# Patient Record
Sex: FEMALE | Race: WHITE | NOT HISPANIC OR LATINO | Employment: OTHER | URBAN - METROPOLITAN AREA
[De-identification: names, ages, dates, MRNs, and addresses within clinical notes are randomized per-mention and may not be internally consistent; named-entity substitution may affect disease eponyms.]

---

## 2017-01-19 ENCOUNTER — HOSPITAL ENCOUNTER (OUTPATIENT)
Dept: MAMMOGRAPHY | Facility: HOSPITAL | Age: 72
Discharge: HOME | End: 2017-01-19
Attending: FAMILY MEDICINE | Admitting: FAMILY MEDICINE

## 2017-04-27 ENCOUNTER — CONVERSION ENCOUNTER (OUTPATIENT)
Dept: CARDIOLOGY | Facility: CLINIC | Age: 72
End: 2017-04-27

## 2017-10-24 ENCOUNTER — CONVERSION ENCOUNTER (OUTPATIENT)
Dept: CARDIOLOGY | Facility: CLINIC | Age: 72
End: 2017-10-24

## 2018-01-25 ENCOUNTER — HOSPITAL ENCOUNTER (OUTPATIENT)
Dept: MAMMOGRAPHY | Facility: HOSPITAL | Age: 73
Discharge: HOME | End: 2018-01-25
Attending: FAMILY MEDICINE | Admitting: FAMILY MEDICINE

## 2018-10-22 ENCOUNTER — CONVERSION ENCOUNTER (OUTPATIENT)
Dept: CARDIOLOGY | Facility: CLINIC | Age: 73
End: 2018-10-22

## 2018-10-22 ENCOUNTER — HOSPITAL ENCOUNTER (OUTPATIENT)
Dept: LAB | Facility: HOSPITAL | Age: 73
Discharge: HOME | End: 2018-10-22
Attending: INTERNAL MEDICINE | Admitting: INTERNAL MEDICINE

## 2018-10-22 LAB
ALBUMIN SERPL-MCNC: 4 G/DL (ref 3.5–4.8)
ALBUMIN/GLOB SERPL: 1.6 {RATIO} (ref 1–1.7)
ALP SERPL-CCNC: 76 IU/L (ref 32–91)
ALT SERPL-CCNC: 21 IU/L (ref 14–54)
ANION GAP SERPL CALC-SCNC: 12.2 MMOL/L (ref 10–20)
AST SERPL-CCNC: 28 IU/L (ref 15–41)
BASOPHILS # BLD AUTO: 0.1 10*3/UL (ref 0–0.2)
BASOPHILS NFR BLD AUTO: 2 % (ref 0–2)
BILIRUB SERPL-MCNC: 2.2 MG/DL (ref 0.3–1.2)
BUN SERPL-MCNC: 13 MG/DL (ref 8–20)
BUN/CREAT SERPL: 13 (ref 5.4–26.2)
CALCIUM SERPL-MCNC: 9.4 MG/DL (ref 8.9–10.3)
CHLORIDE SERPL-SCNC: 101 MMOL/L (ref 101–111)
CHOLEST SERPL-MCNC: 187 MG/DL
CHOLEST/HDLC SERPL: 2.2 {RATIO}
CONV CO2: 28 MMOL/L (ref 22–32)
CONV LDL CHOLESTEROL DIRECT: 94 MG/DL (ref 0–100)
CONV TOTAL PROTEIN: 6.5 G/DL (ref 6.1–7.9)
CREAT UR-MCNC: 1 MG/DL (ref 0.4–1)
DIFFERENTIAL METHOD BLD: (no result)
EOSINOPHIL # BLD AUTO: 0.2 10*3/UL (ref 0–0.3)
EOSINOPHIL # BLD AUTO: 3 % (ref 0–3)
ERYTHROCYTE [DISTWIDTH] IN BLOOD BY AUTOMATED COUNT: 13 % (ref 11.5–14.5)
GLOBULIN UR ELPH-MCNC: 2.5 G/DL (ref 2.5–3.8)
GLUCOSE SERPL-MCNC: 83 MG/DL (ref 65–99)
HCT VFR BLD AUTO: 39.6 % (ref 35–49)
HDLC SERPL-MCNC: 86 MG/DL
HGB BLD-MCNC: 13.3 G/DL (ref 12–15)
LDLC/HDLC SERPL: 1.1 {RATIO}
LIPID INTERPRETATION: NORMAL
LYMPHOCYTES # BLD AUTO: 1.2 10*3/UL (ref 0.8–4.8)
LYMPHOCYTES NFR BLD AUTO: 23 % (ref 18–42)
MAGNESIUM SERPL-MCNC: 1.8 MG/DL (ref 1.8–2.5)
MCH RBC QN AUTO: 31.9 PG (ref 26–32)
MCHC RBC AUTO-ENTMCNC: 33.7 G/DL (ref 32–36)
MCV RBC AUTO: 94.7 FL (ref 80–94)
MONOCYTES # BLD AUTO: 0.5 10*3/UL (ref 0.1–1.3)
MONOCYTES NFR BLD AUTO: 10 % (ref 2–11)
NEUTROPHILS # BLD AUTO: 3.5 10*3/UL (ref 2.3–8.6)
NEUTROPHILS NFR BLD AUTO: 62 % (ref 50–75)
NRBC BLD AUTO-RTO: 0 /100{WBCS}
NRBC/RBC NFR BLD MANUAL: 0 10*3/UL
PLATELET # BLD AUTO: 210 10*3/UL (ref 150–450)
PMV BLD AUTO: 7.7 FL (ref 7.4–10.4)
POTASSIUM SERPL-SCNC: 4.2 MMOL/L (ref 3.6–5.1)
RBC # BLD AUTO: 4.18 10*6/UL (ref 4–5.4)
SODIUM SERPL-SCNC: 137 MMOL/L (ref 136–144)
TRIGL SERPL-MCNC: 61 MG/DL
TSH SERPL-ACNC: 3.09 UIU/ML (ref 0.34–5.6)
VLDLC SERPL CALC-MCNC: 7.3 MG/DL
WBC # BLD AUTO: 5.5 10*3/UL (ref 4.5–11.5)

## 2019-03-14 ENCOUNTER — HOSPITAL ENCOUNTER (OUTPATIENT)
Dept: MAMMOGRAPHY | Facility: HOSPITAL | Age: 74
Discharge: HOME | End: 2019-03-14
Attending: FAMILY MEDICINE | Admitting: FAMILY MEDICINE

## 2019-06-04 VITALS
WEIGHT: 131.5 LBS | HEART RATE: 48 BPM | RESPIRATION RATE: 16 BRPM | DIASTOLIC BLOOD PRESSURE: 82 MMHG | SYSTOLIC BLOOD PRESSURE: 122 MMHG | OXYGEN SATURATION: 99 % | HEART RATE: 83 BPM | WEIGHT: 125.25 LBS | OXYGEN SATURATION: 95 % | DIASTOLIC BLOOD PRESSURE: 94 MMHG | HEART RATE: 80 BPM | SYSTOLIC BLOOD PRESSURE: 180 MMHG | DIASTOLIC BLOOD PRESSURE: 100 MMHG | WEIGHT: 132.75 LBS | SYSTOLIC BLOOD PRESSURE: 162 MMHG

## 2019-06-24 ENCOUNTER — ANTICOAGULATION VISIT (OUTPATIENT)
Dept: CARDIOLOGY | Facility: CLINIC | Age: 74
End: 2019-06-24

## 2019-06-24 ENCOUNTER — TELEPHONE (OUTPATIENT)
Dept: CARDIOLOGY | Facility: CLINIC | Age: 74
End: 2019-06-24

## 2019-06-24 DIAGNOSIS — Z79.01 LONG TERM (CURRENT) USE OF ANTICOAGULANTS: ICD-10-CM

## 2019-06-24 DIAGNOSIS — Z95.2 HX OF AORTIC VALVE REPLACEMENT, MECHANICAL: ICD-10-CM

## 2019-06-24 LAB
INR PPP: 4 (ref 2.5–3.5)
INR PPP: 4 (ref 2.5–3.5)

## 2019-06-24 NOTE — TELEPHONE ENCOUNTER
[facility] called to report INR results patient called in. Patients INR I 4.0, patient tested it today. Please call patient back with dosage changes.

## 2019-07-15 ENCOUNTER — ANTICOAGULATION VISIT (OUTPATIENT)
Dept: CARDIOLOGY | Facility: CLINIC | Age: 74
End: 2019-07-15

## 2019-07-15 DIAGNOSIS — Z95.2 HX OF AORTIC VALVE REPLACEMENT, MECHANICAL: ICD-10-CM

## 2019-07-15 DIAGNOSIS — Z79.01 LONG TERM (CURRENT) USE OF ANTICOAGULANTS: ICD-10-CM

## 2019-07-15 LAB — INR PPP: 3.2 (ref 2.5–3.5)

## 2019-07-31 ENCOUNTER — ANTICOAGULATION VISIT (OUTPATIENT)
Dept: CARDIOLOGY | Facility: CLINIC | Age: 74
End: 2019-07-31

## 2019-07-31 DIAGNOSIS — Z95.2 HX OF AORTIC VALVE REPLACEMENT, MECHANICAL: ICD-10-CM

## 2019-07-31 DIAGNOSIS — Z79.01 LONG TERM (CURRENT) USE OF ANTICOAGULANTS: ICD-10-CM

## 2019-07-31 LAB — INR PPP: 2.7 (ref 2.5–3.5)

## 2019-08-16 ENCOUNTER — ANTICOAGULATION VISIT (OUTPATIENT)
Dept: CARDIOLOGY | Facility: CLINIC | Age: 74
End: 2019-08-16

## 2019-08-16 DIAGNOSIS — Z79.01 LONG TERM (CURRENT) USE OF ANTICOAGULANTS: ICD-10-CM

## 2019-08-16 DIAGNOSIS — Z95.2 HX OF AORTIC VALVE REPLACEMENT, MECHANICAL: ICD-10-CM

## 2019-08-16 LAB — INR PPP: 3.1 (ref 2.5–3.5)

## 2019-09-03 ENCOUNTER — ANTICOAGULATION VISIT (OUTPATIENT)
Dept: CARDIOLOGY | Facility: CLINIC | Age: 74
End: 2019-09-03

## 2019-09-03 DIAGNOSIS — Z79.01 LONG TERM (CURRENT) USE OF ANTICOAGULANTS: ICD-10-CM

## 2019-09-03 DIAGNOSIS — Z95.2 HX OF AORTIC VALVE REPLACEMENT, MECHANICAL: ICD-10-CM

## 2019-09-03 LAB — INR PPP: 3.2 (ref 2.5–3.5)

## 2019-09-23 LAB — INR PPP: 3

## 2019-09-24 ENCOUNTER — ANTICOAGULATION VISIT (OUTPATIENT)
Dept: CARDIOLOGY | Facility: CLINIC | Age: 74
End: 2019-09-24

## 2019-09-24 DIAGNOSIS — Z95.2 HX OF AORTIC VALVE REPLACEMENT, MECHANICAL: ICD-10-CM

## 2019-09-24 DIAGNOSIS — Z79.01 LONG TERM (CURRENT) USE OF ANTICOAGULANTS: ICD-10-CM

## 2019-10-14 ENCOUNTER — ANTICOAGULATION VISIT (OUTPATIENT)
Dept: CARDIOLOGY | Facility: CLINIC | Age: 74
End: 2019-10-14

## 2019-10-14 DIAGNOSIS — Z95.2 HX OF AORTIC VALVE REPLACEMENT, MECHANICAL: ICD-10-CM

## 2019-10-14 DIAGNOSIS — Z79.01 LONG TERM (CURRENT) USE OF ANTICOAGULANTS: ICD-10-CM

## 2019-10-14 LAB — INR PPP: 2.5

## 2019-11-04 ENCOUNTER — ANTICOAGULATION VISIT (OUTPATIENT)
Dept: CARDIOLOGY | Facility: CLINIC | Age: 74
End: 2019-11-04

## 2019-11-04 DIAGNOSIS — Z79.01 LONG TERM (CURRENT) USE OF ANTICOAGULANTS: ICD-10-CM

## 2019-11-04 DIAGNOSIS — Z95.2 HX OF AORTIC VALVE REPLACEMENT, MECHANICAL: ICD-10-CM

## 2019-11-04 LAB — INR PPP: 2.4

## 2019-11-12 ENCOUNTER — OFFICE VISIT (OUTPATIENT)
Dept: CARDIOLOGY | Facility: CLINIC | Age: 74
End: 2019-11-12

## 2019-11-12 VITALS
SYSTOLIC BLOOD PRESSURE: 164 MMHG | HEART RATE: 86 BPM | HEIGHT: 63 IN | WEIGHT: 127 LBS | OXYGEN SATURATION: 100 % | BODY MASS INDEX: 22.5 KG/M2 | DIASTOLIC BLOOD PRESSURE: 112 MMHG

## 2019-11-12 DIAGNOSIS — I10 ESSENTIAL HYPERTENSION: ICD-10-CM

## 2019-11-12 DIAGNOSIS — Z79.01 LONG TERM (CURRENT) USE OF ANTICOAGULANTS: ICD-10-CM

## 2019-11-12 DIAGNOSIS — I48.20 ATRIAL FIBRILLATION, CHRONIC (HCC): ICD-10-CM

## 2019-11-12 DIAGNOSIS — E78.5 DYSLIPIDEMIA: ICD-10-CM

## 2019-11-12 DIAGNOSIS — Z95.2 HISTORY OF MITRAL VALVE REPLACEMENT: Primary | ICD-10-CM

## 2019-11-12 PROCEDURE — 93000 ELECTROCARDIOGRAM COMPLETE: CPT | Performed by: INTERNAL MEDICINE

## 2019-11-12 PROCEDURE — 99214 OFFICE O/P EST MOD 30 MIN: CPT | Performed by: INTERNAL MEDICINE

## 2019-11-12 RX ORDER — LISINOPRIL 20 MG/1
10 TABLET ORAL 2 TIMES DAILY
COMMUNITY
End: 2020-06-30 | Stop reason: DRUGHIGH

## 2019-11-12 RX ORDER — ATORVASTATIN CALCIUM 10 MG/1
10 TABLET, FILM COATED ORAL DAILY
Refills: 3 | Status: ON HOLD | COMMUNITY
Start: 2019-10-07 | End: 2022-10-15 | Stop reason: SDUPTHER

## 2019-11-12 RX ORDER — WARFARIN SODIUM 5 MG/1
5 TABLET ORAL WEEKLY
COMMUNITY
Start: 2014-08-05 | End: 2022-10-14

## 2019-11-12 RX ORDER — ATENOLOL 100 MG/1
TABLET ORAL
COMMUNITY
Start: 2018-10-21 | End: 2020-01-20

## 2019-11-12 RX ORDER — ASPIRIN 81 MG/1
81 TABLET ORAL DAILY
COMMUNITY
Start: 2014-10-22

## 2019-11-12 NOTE — PROGRESS NOTES
Encounter Date:11/12/2019  Last seen 5/16/2019      Patient ID: Melody Hernandez is a 73 y.o. female.    Chief Complaint:  Follow-up status post mitral valve replacement  Left bundle branch block  Chronic atrial fibrillation  Hypertension  Dyslipidemia  Anticoagulation management    History of Present Illness  Since I have last seen, the patient has been without any chest discomfort , unusual shortness of breath, palpitations, dizziness or syncope.  Denies having any headache ,abdominal pain ,nausea, vomiting , diarrhea constipation, loss of weight or loss of appetite.  Denies having any excessive bruising ,hematuria or blood in the stool.    Review of all systems negative except as indicated    Assessment and Plan       ///////////////////  Impression  ==============   - Status post mitral valve replacement with [employer] mechanical heart valve and pulmonary vein isolation for atrial fibrillation May 2010 (patient had history of significant mitral valve prolapse and mitral regurgitation).     -   Persistent and chronic atrial fibrillation . Status post electrical cardioversion 05/24/2013 and 05/13/2016.   Patient is in atrial fibrillation today.     -moderate tricuspid regurgitation     - left bundle-branch block     -  Exertional shortness of breath and fatigue  -stable and improved     Lexiscan Cardiolite test is negative for myocardial ischemia 10/25/2016.  Echocardiogram 10/25/2016 revealed normally functioning prosthetic mitral valve biatrial enlargement moderate mitral regurgitation and thickened aortic valve.     - Dyslipidemia and hypertension      - Smoker      - Family history of coronary artery disease  .   - allergy to Cipro and codeine     -history of intolerance to amiodarone.  ============  Plan  ================  EKG showed atrial fibrillation with controlled ventricular response.  Anticoagulation status was reviewed.  Home monitoring.  INR is around 2.3    continue atenolol 50 mg twice a day and  lisinopril 10 mg for blood pressure more than 150.  Patient is off Lanoxin.    Continue Coumadin and baby aspirin.  Continue Lipitor   medications were reviewed and updated.   followup in the office in 6 months.  Further plan will depend on patient's progress.  [[[[[[[[[[[[[[[[[[[[[[[[[            Diagnosis Plan   1. History of mitral valve replacement  ECG 12 Lead   2. Dyslipidemia  ECG 12 Lead   3. Long term (current) use of anticoagulants [Z79.01]  ECG 12 Lead   4. Essential hypertension     5. Atrial fibrillation, chronic     LAB RESULTS (LAST 7 DAYS)    CBC        BMP        CMP         BNP        TROPONIN        CoAg        Creatinine Clearance  CrCl cannot be calculated (Patient's most recent lab result is older than the maximum 30 days allowed.).    ABG        Radiology  No radiology results for the last day                The following portions of the patient's history were reviewed and updated as appropriate: allergies, current medications, past family history, past medical history, past social history, past surgical history and problem list.    Review of Systems   Constitution: Negative for malaise/fatigue.   Cardiovascular: Negative for chest pain, leg swelling, palpitations and syncope.   Respiratory: Positive for shortness of breath.    Skin: Negative for rash.   Gastrointestinal: Negative for nausea and vomiting.   Neurological: Negative for dizziness, light-headedness and numbness.         Current Outpatient Medications:   •  aspirin (ASPIR-LOW) 81 MG EC tablet, ASPIR-LOW 81 MG TBEC, Disp: , Rfl:   •  atenolol (TENORMIN) 100 MG tablet, ATENOLOL 100 MG TABS, Disp: , Rfl:   •  atorvastatin (LIPITOR) 10 MG tablet, Take 10 mg by mouth Daily., Disp: , Rfl: 3  •  lisinopril (PRINIVIL,ZESTRIL) 20 MG tablet, Take 20 mg by mouth Daily. Take 1/2 tab by mouth every morning and 1 whole tablet by mouth every evening., Disp: , Rfl:   •  warfarin (COUMADIN) 5 MG tablet, WARFARIN SODIUM 5 MG TABS, Disp: , Rfl:  "    Allergies   Allergen Reactions   • Ciprofloxacin Unknown (See Comments)   • Codeine Unknown (See Comments)       Family History   Problem Relation Age of Onset   • Heart disease Mother    • Heart attack Mother    • Hypertension Mother        Past Surgical History:   Procedure Laterality Date   • APPENDECTOMY     • CARDIOVERSION     • CHOLECYSTECTOMY     • MITRAL VALVE REPLACEMENT  05/20/2010    Mechanical    • TONSILLECTOMY         Past Medical History:   Diagnosis Date   • Hyperlipidemia    • Mitral valve prolapse        Family History   Problem Relation Age of Onset   • Heart disease Mother    • Heart attack Mother    • Hypertension Mother        Social History     Socioeconomic History   • Marital status:      Spouse name: Not on file   • Number of children: Not on file   • Years of education: Not on file   • Highest education level: Not on file   Tobacco Use   • Smoking status: Former Smoker     Types: Cigarettes     Last attempt to quit: 2009     Years since quitting: 10.8           ECG 12 Lead  Date/Time: 11/12/2019 2:29 PM  Performed by: Ahmet Lynn MD  Authorized by: Ahmet Lynn MD   Comparison: compared with previous ECG   Similar to previous ECG  Comments: Atrial fibrillation with controlled ventricular response 82/min nonspecific ST-T wave changes normal axis normal intervals no ectopy.  No change from 5/16/2019              Objective:       Physical Exam    BP (!) 164/112 (BP Location: Right arm, Patient Position: Sitting, Cuff Size: Large Adult)   Pulse 86   Ht 160 cm (63\")   Wt 57.6 kg (127 lb)   SpO2 100%   BMI 22.50 kg/m²   The patient is alert, oriented and in no distress.    Vital signs as noted above.    Head and neck revealed no carotid bruits or jugular venous distension.  No thyromegaly or lymphadenopathy is present.    Lungs clear.  No wheezing.  Breath sounds are normal bilaterally.    Heart normal and crisp prosthetic valve sounds.  No murmur..  No pericardial rub " is present.  No gallop is present.    Abdomen soft and nontender.  No organomegaly is present.    Extremities revealed good peripheral pulses without any pedal edema.    Skin warm and dry.    Musculoskeletal system is grossly normal.    CNS grossly normal.

## 2019-11-20 ENCOUNTER — ANTICOAGULATION VISIT (OUTPATIENT)
Dept: CARDIOLOGY | Facility: CLINIC | Age: 74
End: 2019-11-20

## 2019-11-20 DIAGNOSIS — Z79.01 LONG TERM (CURRENT) USE OF ANTICOAGULANTS: ICD-10-CM

## 2019-11-20 DIAGNOSIS — Z95.2 HX OF AORTIC VALVE REPLACEMENT, MECHANICAL: ICD-10-CM

## 2019-11-20 LAB — INR PPP: 3.7

## 2019-12-06 ENCOUNTER — ANTICOAGULATION VISIT (OUTPATIENT)
Dept: CARDIOLOGY | Facility: CLINIC | Age: 74
End: 2019-12-06

## 2019-12-06 DIAGNOSIS — Z95.2 HX OF AORTIC VALVE REPLACEMENT, MECHANICAL: ICD-10-CM

## 2019-12-06 DIAGNOSIS — Z79.01 LONG TERM (CURRENT) USE OF ANTICOAGULANTS: ICD-10-CM

## 2019-12-06 LAB — INR PPP: 3

## 2019-12-23 ENCOUNTER — ANTICOAGULATION VISIT (OUTPATIENT)
Dept: CARDIOLOGY | Facility: CLINIC | Age: 74
End: 2019-12-23

## 2019-12-23 DIAGNOSIS — Z79.01 LONG TERM (CURRENT) USE OF ANTICOAGULANTS: ICD-10-CM

## 2019-12-23 DIAGNOSIS — Z95.2 HX OF AORTIC VALVE REPLACEMENT, MECHANICAL: ICD-10-CM

## 2019-12-23 LAB — INR PPP: 4.2

## 2020-01-06 ENCOUNTER — ANTICOAGULATION VISIT (OUTPATIENT)
Dept: CARDIOLOGY | Facility: CLINIC | Age: 75
End: 2020-01-06

## 2020-01-06 ENCOUNTER — TELEPHONE (OUTPATIENT)
Dept: CARDIOLOGY | Facility: CLINIC | Age: 75
End: 2020-01-06

## 2020-01-06 DIAGNOSIS — Z95.2 HX OF AORTIC VALVE REPLACEMENT, MECHANICAL: ICD-10-CM

## 2020-01-06 DIAGNOSIS — Z79.01 LONG TERM (CURRENT) USE OF ANTICOAGULANTS: ICD-10-CM

## 2020-01-06 LAB — INR PPP: 2

## 2020-01-06 NOTE — TELEPHONE ENCOUNTER
Attempt to contact pt - call disconnected.    msg left to home number to continue current warfarin dosing til recheck next week.

## 2020-01-20 RX ORDER — ATENOLOL 100 MG/1
TABLET ORAL
Qty: 90 TABLET | Refills: 0 | Status: SHIPPED | OUTPATIENT
Start: 2020-01-20 | End: 2020-04-16

## 2020-01-21 ENCOUNTER — ANTICOAGULATION VISIT (OUTPATIENT)
Dept: CARDIOLOGY | Facility: CLINIC | Age: 75
End: 2020-01-21

## 2020-01-21 DIAGNOSIS — Z95.2 HX OF AORTIC VALVE REPLACEMENT, MECHANICAL: ICD-10-CM

## 2020-01-21 DIAGNOSIS — Z79.01 LONG TERM (CURRENT) USE OF ANTICOAGULANTS: ICD-10-CM

## 2020-01-21 LAB — INR PPP: 1.9

## 2020-02-03 ENCOUNTER — ANTICOAGULATION VISIT (OUTPATIENT)
Dept: CARDIOLOGY | Facility: CLINIC | Age: 75
End: 2020-02-03

## 2020-02-03 DIAGNOSIS — Z95.2 HX OF AORTIC VALVE REPLACEMENT, MECHANICAL: ICD-10-CM

## 2020-02-03 DIAGNOSIS — Z79.01 LONG TERM (CURRENT) USE OF ANTICOAGULANTS: ICD-10-CM

## 2020-02-03 LAB — INR PPP: 1.8

## 2020-02-18 ENCOUNTER — ANTICOAGULATION VISIT (OUTPATIENT)
Dept: CARDIOLOGY | Facility: CLINIC | Age: 75
End: 2020-02-18

## 2020-02-18 DIAGNOSIS — Z95.2 HX OF AORTIC VALVE REPLACEMENT, MECHANICAL: ICD-10-CM

## 2020-02-18 DIAGNOSIS — Z79.01 LONG TERM (CURRENT) USE OF ANTICOAGULANTS: ICD-10-CM

## 2020-02-18 LAB — INR PPP: 4.3

## 2020-02-20 ENCOUNTER — TRANSCRIBE ORDERS (OUTPATIENT)
Dept: ADMINISTRATIVE | Facility: HOSPITAL | Age: 75
End: 2020-02-20

## 2020-02-20 DIAGNOSIS — Z12.31 VISIT FOR SCREENING MAMMOGRAM: Primary | ICD-10-CM

## 2020-02-24 LAB — INR PPP: 3

## 2020-03-04 ENCOUNTER — ANTICOAGULATION VISIT (OUTPATIENT)
Dept: CARDIOLOGY | Facility: CLINIC | Age: 75
End: 2020-03-04

## 2020-03-04 DIAGNOSIS — Z79.01 LONG TERM (CURRENT) USE OF ANTICOAGULANTS: ICD-10-CM

## 2020-03-04 DIAGNOSIS — Z95.2 HX OF AORTIC VALVE REPLACEMENT, MECHANICAL: ICD-10-CM

## 2020-03-09 ENCOUNTER — ANTICOAGULATION VISIT (OUTPATIENT)
Dept: CARDIOLOGY | Facility: CLINIC | Age: 75
End: 2020-03-09

## 2020-03-09 ENCOUNTER — TELEPHONE (OUTPATIENT)
Dept: CARDIOLOGY | Facility: CLINIC | Age: 75
End: 2020-03-09

## 2020-03-09 DIAGNOSIS — Z95.2 HX OF AORTIC VALVE REPLACEMENT, MECHANICAL: ICD-10-CM

## 2020-03-09 DIAGNOSIS — Z79.01 LONG TERM (CURRENT) USE OF ANTICOAGULANTS: ICD-10-CM

## 2020-03-09 LAB — INR PPP: 2.1

## 2020-03-17 ENCOUNTER — APPOINTMENT (OUTPATIENT)
Dept: MAMMOGRAPHY | Facility: HOSPITAL | Age: 75
End: 2020-03-17

## 2020-03-23 ENCOUNTER — ANTICOAGULATION VISIT (OUTPATIENT)
Dept: CARDIOLOGY | Facility: CLINIC | Age: 75
End: 2020-03-23

## 2020-03-23 DIAGNOSIS — Z95.2 HX OF AORTIC VALVE REPLACEMENT, MECHANICAL: ICD-10-CM

## 2020-03-23 DIAGNOSIS — Z79.01 LONG TERM (CURRENT) USE OF ANTICOAGULANTS: ICD-10-CM

## 2020-03-23 LAB — INR PPP: 1.8

## 2020-04-06 ENCOUNTER — ANTICOAGULATION VISIT (OUTPATIENT)
Dept: CARDIOLOGY | Facility: CLINIC | Age: 75
End: 2020-04-06

## 2020-04-06 DIAGNOSIS — Z79.01 LONG TERM (CURRENT) USE OF ANTICOAGULANTS: ICD-10-CM

## 2020-04-06 DIAGNOSIS — Z95.2 HX OF AORTIC VALVE REPLACEMENT, MECHANICAL: ICD-10-CM

## 2020-04-06 LAB — INR PPP: 1.6

## 2020-04-16 RX ORDER — ATENOLOL 100 MG/1
TABLET ORAL
Qty: 90 TABLET | Refills: 3 | Status: SHIPPED | OUTPATIENT
Start: 2020-04-16 | End: 2021-01-11

## 2020-04-20 ENCOUNTER — ANTICOAGULATION VISIT (OUTPATIENT)
Dept: CARDIOLOGY | Facility: CLINIC | Age: 75
End: 2020-04-20

## 2020-04-20 DIAGNOSIS — Z79.01 LONG TERM (CURRENT) USE OF ANTICOAGULANTS: ICD-10-CM

## 2020-04-20 DIAGNOSIS — Z95.2 HX OF AORTIC VALVE REPLACEMENT, MECHANICAL: ICD-10-CM

## 2020-04-20 LAB — INR PPP: 3.1

## 2020-05-04 ENCOUNTER — ANTICOAGULATION VISIT (OUTPATIENT)
Dept: CARDIOLOGY | Facility: CLINIC | Age: 75
End: 2020-05-04

## 2020-05-04 DIAGNOSIS — Z95.2 HX OF AORTIC VALVE REPLACEMENT, MECHANICAL: ICD-10-CM

## 2020-05-04 DIAGNOSIS — Z79.01 LONG TERM (CURRENT) USE OF ANTICOAGULANTS: ICD-10-CM

## 2020-05-04 LAB — INR PPP: 3.3

## 2020-05-18 ENCOUNTER — ANTICOAGULATION VISIT (OUTPATIENT)
Dept: CARDIOLOGY | Facility: CLINIC | Age: 75
End: 2020-05-18

## 2020-05-18 DIAGNOSIS — Z79.01 LONG TERM (CURRENT) USE OF ANTICOAGULANTS: ICD-10-CM

## 2020-05-18 DIAGNOSIS — Z95.2 HX OF AORTIC VALVE REPLACEMENT, MECHANICAL: ICD-10-CM

## 2020-05-18 LAB — INR PPP: 3.2

## 2020-06-01 ENCOUNTER — ANTICOAGULATION VISIT (OUTPATIENT)
Dept: CARDIOLOGY | Facility: CLINIC | Age: 75
End: 2020-06-01

## 2020-06-01 DIAGNOSIS — Z95.2 HX OF AORTIC VALVE REPLACEMENT, MECHANICAL: ICD-10-CM

## 2020-06-01 DIAGNOSIS — Z79.01 LONG TERM (CURRENT) USE OF ANTICOAGULANTS: ICD-10-CM

## 2020-06-01 LAB — INR PPP: 5.3

## 2020-06-04 ENCOUNTER — OFFICE VISIT (OUTPATIENT)
Dept: CARDIOLOGY | Facility: CLINIC | Age: 75
End: 2020-06-04

## 2020-06-04 ENCOUNTER — LAB (OUTPATIENT)
Dept: LAB | Facility: HOSPITAL | Age: 75
End: 2020-06-04

## 2020-06-04 VITALS
HEIGHT: 63 IN | HEART RATE: 72 BPM | BODY MASS INDEX: 23.04 KG/M2 | DIASTOLIC BLOOD PRESSURE: 102 MMHG | OXYGEN SATURATION: 98 % | WEIGHT: 130 LBS | SYSTOLIC BLOOD PRESSURE: 161 MMHG

## 2020-06-04 DIAGNOSIS — Z95.2 HISTORY OF MITRAL VALVE REPLACEMENT: ICD-10-CM

## 2020-06-04 DIAGNOSIS — I10 ESSENTIAL HYPERTENSION: ICD-10-CM

## 2020-06-04 DIAGNOSIS — E78.5 DYSLIPIDEMIA: ICD-10-CM

## 2020-06-04 DIAGNOSIS — R06.02 SHORTNESS OF BREATH: ICD-10-CM

## 2020-06-04 DIAGNOSIS — R06.02 SHORTNESS OF BREATH: Primary | ICD-10-CM

## 2020-06-04 DIAGNOSIS — Z79.01 LONG TERM (CURRENT) USE OF ANTICOAGULANTS: ICD-10-CM

## 2020-06-04 DIAGNOSIS — Z95.2 S/P MVR (MITRAL VALVE REPLACEMENT): ICD-10-CM

## 2020-06-04 LAB
ALBUMIN SERPL-MCNC: 4.6 G/DL (ref 3.5–5.2)
ALBUMIN/GLOB SERPL: 2 G/DL
ALP SERPL-CCNC: 64 U/L (ref 39–117)
ALT SERPL W P-5'-P-CCNC: 19 U/L (ref 1–33)
ANION GAP SERPL CALCULATED.3IONS-SCNC: 10.8 MMOL/L (ref 5–15)
AST SERPL-CCNC: 27 U/L (ref 1–32)
BASOPHILS # BLD AUTO: 0.07 10*3/MM3 (ref 0–0.2)
BASOPHILS NFR BLD AUTO: 1.1 % (ref 0–1.5)
BILIRUB SERPL-MCNC: 2.5 MG/DL (ref 0.2–1.2)
BUN BLD-MCNC: 14 MG/DL (ref 8–23)
BUN/CREAT SERPL: 13.7 (ref 7–25)
CALCIUM SPEC-SCNC: 9.6 MG/DL (ref 8.6–10.5)
CHLORIDE SERPL-SCNC: 100 MMOL/L (ref 98–107)
CO2 SERPL-SCNC: 27.2 MMOL/L (ref 22–29)
CREAT BLD-MCNC: 1.02 MG/DL (ref 0.57–1)
DEPRECATED RDW RBC AUTO: 43.4 FL (ref 37–54)
EOSINOPHIL # BLD AUTO: 0.15 10*3/MM3 (ref 0–0.4)
EOSINOPHIL NFR BLD AUTO: 2.3 % (ref 0.3–6.2)
ERYTHROCYTE [DISTWIDTH] IN BLOOD BY AUTOMATED COUNT: 12.7 % (ref 12.3–15.4)
GFR SERPL CREATININE-BSD FRML MDRD: 53 ML/MIN/1.73
GLOBULIN UR ELPH-MCNC: 2.3 GM/DL
GLUCOSE BLD-MCNC: 95 MG/DL (ref 65–99)
HCT VFR BLD AUTO: 41.4 % (ref 34–46.6)
HGB BLD-MCNC: 13.8 G/DL (ref 12–15.9)
IMM GRANULOCYTES # BLD AUTO: 0.03 10*3/MM3 (ref 0–0.05)
IMM GRANULOCYTES NFR BLD AUTO: 0.5 % (ref 0–0.5)
LYMPHOCYTES # BLD AUTO: 1.32 10*3/MM3 (ref 0.7–3.1)
LYMPHOCYTES NFR BLD AUTO: 20.6 % (ref 19.6–45.3)
MAGNESIUM SERPL-MCNC: 1.9 MG/DL (ref 1.6–2.4)
MCH RBC QN AUTO: 31.1 PG (ref 26.6–33)
MCHC RBC AUTO-ENTMCNC: 33.3 G/DL (ref 31.5–35.7)
MCV RBC AUTO: 93.2 FL (ref 79–97)
MONOCYTES # BLD AUTO: 0.64 10*3/MM3 (ref 0.1–0.9)
MONOCYTES NFR BLD AUTO: 10 % (ref 5–12)
NEUTROPHILS # BLD AUTO: 4.19 10*3/MM3 (ref 1.7–7)
NEUTROPHILS NFR BLD AUTO: 65.5 % (ref 42.7–76)
NRBC BLD AUTO-RTO: 0 /100 WBC (ref 0–0.2)
NT-PROBNP SERPL-MCNC: 2275 PG/ML (ref 5–900)
PLATELET # BLD AUTO: 278 10*3/MM3 (ref 140–450)
PMV BLD AUTO: 9.7 FL (ref 6–12)
POTASSIUM BLD-SCNC: 4.4 MMOL/L (ref 3.5–5.2)
PROT SERPL-MCNC: 6.9 G/DL (ref 6–8.5)
RBC # BLD AUTO: 4.44 10*6/MM3 (ref 3.77–5.28)
SODIUM BLD-SCNC: 138 MMOL/L (ref 136–145)
TSH SERPL DL<=0.05 MIU/L-ACNC: 3.17 UIU/ML (ref 0.27–4.2)
WBC NRBC COR # BLD: 6.4 10*3/MM3 (ref 3.4–10.8)

## 2020-06-04 PROCEDURE — 36415 COLL VENOUS BLD VENIPUNCTURE: CPT

## 2020-06-04 PROCEDURE — 99214 OFFICE O/P EST MOD 30 MIN: CPT | Performed by: INTERNAL MEDICINE

## 2020-06-04 PROCEDURE — 83735 ASSAY OF MAGNESIUM: CPT

## 2020-06-04 PROCEDURE — 83880 ASSAY OF NATRIURETIC PEPTIDE: CPT

## 2020-06-04 PROCEDURE — 85025 COMPLETE CBC W/AUTO DIFF WBC: CPT

## 2020-06-04 PROCEDURE — 80053 COMPREHEN METABOLIC PANEL: CPT

## 2020-06-04 PROCEDURE — 93000 ELECTROCARDIOGRAM COMPLETE: CPT | Performed by: INTERNAL MEDICINE

## 2020-06-04 PROCEDURE — 84443 ASSAY THYROID STIM HORMONE: CPT

## 2020-06-04 NOTE — PROGRESS NOTES
Encounter Date:06/04/2020  Last seen 11/12/2019      Patient ID: Melody Hernandez is a 74 y.o. female.    Chief Complaint:  Recent shortness of breath and tiredness-new problems    Follow-up status post mitral valve replacement  Left bundle branch block  Chronic atrial fibrillation  Hypertension  Dyslipidemia  Anticoagulation management     History of Present Illness  Patient has been having progressively worsening recent shortness of breath with activities such as walking to the mailbox and extreme fatigue and having palpitations.  Symptoms somewhat similar to what she was having prior to valve replacement.  No chest pains    Since I have last seen, the patient has been without any chest discomfort , palpitations, dizziness or syncope.  Denies having any headache ,abdominal pain ,nausea, vomiting , diarrhea constipation, loss of weight or loss of appetite.  Denies having any excessive bruising ,hematuria or blood in the stool.     Review of all systems negative except as indicated     Assessment and Plan         ///////////////////  Impression  ==============  -Recent onset of significant shortness of breath with activity and fatigue     - Status post mitral valve replacement with [employer] mechanical heart valve and pulmonary vein isolation for atrial fibrillation May 2010 (patient had history of significant mitral valve prolapse and mitral regurgitation).     -   Persistent and chronic atrial fibrillation . Status post electrical cardioversion 05/24/2013 and 05/13/2016.   Patient is in atrial fibrillation today.     -moderate tricuspid regurgitation     - left bundle-branch block     -  Exertional shortness of breath and fatigue  -stable and improved     Lexiscan Cardiolite test is negative for myocardial ischemia 10/25/2016.  Echocardiogram 10/25/2016 revealed normally functioning prosthetic mitral valve biatrial enlargement moderate mitral regurgitation and thickened aortic valve.     - Dyslipidemia and  hypertension      - Smoker      - Family history of coronary artery disease  .   - allergy to Cipro and codeine     -history of intolerance to amiodarone.  ============  Plan  ================  EKG showed atrial fibrillation with controlled ventricular response.  Recent shortness of breath and fatigue.  Anticoagulation status was reviewed.  Home monitoring.  INR is around 2.3  continue atenolol 50 mg twice a day and lisinopril 10 mg for blood pressure more than 150.  Patient is off Lanoxin.    Continue Coumadin and baby aspirin.  Continue Lipitor  medications were reviewed and updated.  Stress Cardiolite test  Echocardiogram.  Patient to have labs including CMP CBC TSH magnesium level.   followup in the office in on the same day.  Further plan will depend on patient's progress.  [[[[[[[[[[[[[[[[[[[[[[[[[                   Diagnosis Plan   1. Shortness of breath  Adult Transthoracic Echo Complete W/ Cont if Necessary Per Protocol    Stress Test With Myocardial Perfusion One Day    BNP    CBC & Differential    Magnesium    TSH    Comprehensive Metabolic Panel   2. S/P MVR (mitral valve replacement)  Adult Transthoracic Echo Complete W/ Cont if Necessary Per Protocol    Stress Test With Myocardial Perfusion One Day    BNP    CBC & Differential    Magnesium    TSH    Comprehensive Metabolic Panel   3. Essential hypertension  Adult Transthoracic Echo Complete W/ Cont if Necessary Per Protocol    Stress Test With Myocardial Perfusion One Day    BNP    CBC & Differential    Magnesium    TSH    Comprehensive Metabolic Panel   4. Long term (current) use of anticoagulants     5. Dyslipidemia     6. History of mitral valve replacement     LAB RESULTS (LAST 7 DAYS)    CBC        BMP        CMP         BNP        TROPONIN        CoAg  Results from last 7 days   Lab Units 06/01/20   INR  5.30       Creatinine Clearance  CrCl cannot be calculated (Patient's most recent lab result is older than the maximum 30 days  allowed.).    ABG        Radiology  No radiology results for the last day                The following portions of the patient's history were reviewed and updated as appropriate: allergies, current medications, past family history, past medical history, past social history, past surgical history and problem list.    Review of Systems   Constitution: Positive for malaise/fatigue.   Cardiovascular: Positive for palpitations. Negative for chest pain, leg swelling and syncope.   Respiratory: Positive for shortness of breath.    Skin: Negative for rash.   Gastrointestinal: Negative for nausea and vomiting.   Neurological: Negative for dizziness, light-headedness and numbness.         Current Outpatient Medications:   •  aspirin (ASPIR-LOW) 81 MG EC tablet, ASPIR-LOW 81 MG TBEC, Disp: , Rfl:   •  atenolol (TENORMIN) 100 MG tablet, Take 1/2 (one-half) tablet by mouth twice daily, Disp: 90 tablet, Rfl: 3  •  atorvastatin (LIPITOR) 10 MG tablet, Take 10 mg by mouth Daily., Disp: , Rfl: 3  •  lisinopril (PRINIVIL,ZESTRIL) 20 MG tablet, Take 10 mg by mouth 2 (Two) Times a Day., Disp: , Rfl:   •  warfarin (COUMADIN) 5 MG tablet, WARFARIN SODIUM 5 MG TABS, Disp: , Rfl:     Allergies   Allergen Reactions   • Ciprofloxacin Unknown (See Comments)   • Codeine Unknown (See Comments)       Family History   Problem Relation Age of Onset   • Heart disease Mother    • Heart attack Mother    • Hypertension Mother        Past Surgical History:   Procedure Laterality Date   • APPENDECTOMY     • CARDIOVERSION     • CHOLECYSTECTOMY     • MITRAL VALVE REPLACEMENT  05/20/2010    Mechanical    • TONSILLECTOMY         Past Medical History:   Diagnosis Date   • Hyperlipidemia    • Mitral valve prolapse        Family History   Problem Relation Age of Onset   • Heart disease Mother    • Heart attack Mother    • Hypertension Mother        Social History     Socioeconomic History   • Marital status:      Spouse name: Not on file   • Number of  "children: Not on file   • Years of education: Not on file   • Highest education level: Not on file   Tobacco Use   • Smoking status: Former Smoker     Types: Cigarettes     Last attempt to quit: 2009     Years since quittin.4           ECG 12 Lead  Date/Time: 2020 1:44 PM  Performed by: Ahmet Lynn MD  Authorized by: Ahmet Lynn MD   Comparison: compared with previous ECG   Similar to previous ECG  Comments: Atrial fibrillation with controlled ventricular response 75/min nonspecific ST-T wave changes right axis deviation no ectopy normal intervals no significant change from 2019              Objective:       Physical Exam    BP (!) 161/102 (BP Location: Right arm, Patient Position: Sitting, Cuff Size: Large Adult)   Pulse 72   Ht 160 cm (63\")   Wt 59 kg (130 lb)   SpO2 98%   BMI 23.03 kg/m²   The patient is alert, oriented and in no distress.    Vital signs as noted above.    Head and neck revealed no carotid bruits or jugular venous distension.  No thyromegaly or lymphadenopathy is present.    Lungs clear.  No wheezing.  Breath sounds are normal bilaterally.    Heart normal and crisp prosthetic heart sounds.  No murmur..  No pericardial rub is present.  No gallop is present.    Abdomen soft and nontender.  No organomegaly is present.    Extremities revealed good peripheral pulses without any pedal edema.    Skin warm and dry.    Musculoskeletal system is grossly normal.    CNS grossly normal.        "

## 2020-06-09 ENCOUNTER — TELEPHONE (OUTPATIENT)
Dept: CARDIOLOGY | Facility: CLINIC | Age: 75
End: 2020-06-09

## 2020-06-09 RX ORDER — FUROSEMIDE 20 MG/1
20 TABLET ORAL DAILY
COMMUNITY
End: 2022-10-14

## 2020-06-09 NOTE — TELEPHONE ENCOUNTER
Spoke with pt - advised to start Lasix AFTER stress test   Understood.  Says has Lasix 20mg at home - verified not .

## 2020-06-09 NOTE — TELEPHONE ENCOUNTER
Labs are okay except for increased proBNP.  After stress Cardiolite test and echocardiogram may start low-dose Lasix 20 mg a day.

## 2020-06-15 ENCOUNTER — ANTICOAGULATION VISIT (OUTPATIENT)
Dept: CARDIOLOGY | Facility: CLINIC | Age: 75
End: 2020-06-15

## 2020-06-15 DIAGNOSIS — Z95.2 HX OF AORTIC VALVE REPLACEMENT, MECHANICAL: ICD-10-CM

## 2020-06-15 DIAGNOSIS — Z79.01 LONG TERM (CURRENT) USE OF ANTICOAGULANTS: ICD-10-CM

## 2020-06-15 LAB — INR PPP: 2.5

## 2020-06-25 ENCOUNTER — HOSPITAL ENCOUNTER (OUTPATIENT)
Dept: CARDIOLOGY | Facility: HOSPITAL | Age: 75
Discharge: HOME | End: 2020-06-25

## 2020-06-25 ENCOUNTER — OFFICE VISIT (OUTPATIENT)
Dept: CARDIOLOGY | Facility: CLINIC | Age: 75
End: 2020-06-25

## 2020-06-25 VITALS
DIASTOLIC BLOOD PRESSURE: 76 MMHG | BODY MASS INDEX: 23.04 KG/M2 | SYSTOLIC BLOOD PRESSURE: 120 MMHG | HEIGHT: 63 IN | WEIGHT: 130 LBS

## 2020-06-25 VITALS
BODY MASS INDEX: 23.04 KG/M2 | SYSTOLIC BLOOD PRESSURE: 130 MMHG | HEIGHT: 63 IN | WEIGHT: 130 LBS | HEART RATE: 59 BPM | DIASTOLIC BLOOD PRESSURE: 80 MMHG

## 2020-06-25 DIAGNOSIS — Z95.2 S/P MVR (MITRAL VALVE REPLACEMENT): ICD-10-CM

## 2020-06-25 DIAGNOSIS — I10 ESSENTIAL HYPERTENSION: ICD-10-CM

## 2020-06-25 DIAGNOSIS — R06.02 SHORTNESS OF BREATH: ICD-10-CM

## 2020-06-25 DIAGNOSIS — E78.5 DYSLIPIDEMIA: ICD-10-CM

## 2020-06-25 DIAGNOSIS — Z79.01 LONG TERM (CURRENT) USE OF ANTICOAGULANTS: ICD-10-CM

## 2020-06-25 DIAGNOSIS — Z95.2 S/P MVR (MITRAL VALVE REPLACEMENT): Primary | ICD-10-CM

## 2020-06-25 LAB
BH CV ECHO MEAS - ACS: 2.3 CM
BH CV ECHO MEAS - AO MAX PG (FULL): 2.2 MMHG
BH CV ECHO MEAS - AO MAX PG: 3.3 MMHG
BH CV ECHO MEAS - AO MEAN PG (FULL): 1.1 MMHG
BH CV ECHO MEAS - AO MEAN PG: 1.8 MMHG
BH CV ECHO MEAS - AO ROOT AREA (BSA CORRECTED): 1.7
BH CV ECHO MEAS - AO ROOT AREA: 5.6 CM^2
BH CV ECHO MEAS - AO ROOT DIAM: 2.7 CM
BH CV ECHO MEAS - AO V2 MAX: 90.3 CM/SEC
BH CV ECHO MEAS - AO V2 MEAN: 63.7 CM/SEC
BH CV ECHO MEAS - AO V2 VTI: 15.9 CM
BH CV ECHO MEAS - ASC AORTA: 3.1 CM
BH CV ECHO MEAS - AVA(I,A): 2.2 CM^2
BH CV ECHO MEAS - AVA(I,D): 2.2 CM^2
BH CV ECHO MEAS - AVA(V,A): 1.5 CM^2
BH CV ECHO MEAS - AVA(V,D): 1.5 CM^2
BH CV ECHO MEAS - BSA(HAYCOCK): 1.6 M^2
BH CV ECHO MEAS - BSA: 1.6 M^2
BH CV ECHO MEAS - BZI_BMI: 23 KILOGRAMS/M^2
BH CV ECHO MEAS - BZI_METRIC_HEIGHT: 160 CM
BH CV ECHO MEAS - BZI_METRIC_WEIGHT: 59 KG
BH CV ECHO MEAS - EDV(CUBED): 121.3 ML
BH CV ECHO MEAS - EDV(MOD-SP4): 60.8 ML
BH CV ECHO MEAS - EDV(TEICH): 115.5 ML
BH CV ECHO MEAS - EF(CUBED): 67.4 %
BH CV ECHO MEAS - EF(MOD-SP4): 61.6 %
BH CV ECHO MEAS - EF(TEICH): 58.8 %
BH CV ECHO MEAS - ESV(CUBED): 39.5 ML
BH CV ECHO MEAS - ESV(MOD-SP4): 23.4 ML
BH CV ECHO MEAS - ESV(TEICH): 47.6 ML
BH CV ECHO MEAS - FS: 31.2 %
BH CV ECHO MEAS - IVS/LVPW: 1.3
BH CV ECHO MEAS - IVSD: 1.1 CM
BH CV ECHO MEAS - LA DIMENSION: 6.7 CM
BH CV ECHO MEAS - LA/AO: 2.5
BH CV ECHO MEAS - LV DIASTOLIC VOL/BSA (35-75): 37.8 ML/M^2
BH CV ECHO MEAS - LV MASS(C)D: 174.7 GRAMS
BH CV ECHO MEAS - LV MASS(C)DI: 108.5 GRAMS/M^2
BH CV ECHO MEAS - LV MAX PG: 1.1 MMHG
BH CV ECHO MEAS - LV MEAN PG: 0.69 MMHG
BH CV ECHO MEAS - LV SYSTOLIC VOL/BSA (12-30): 14.5 ML/M^2
BH CV ECHO MEAS - LV V1 MAX: 52.7 CM/SEC
BH CV ECHO MEAS - LV V1 MEAN: 40.1 CM/SEC
BH CV ECHO MEAS - LV V1 VTI: 13.7 CM
BH CV ECHO MEAS - LVIDD: 4.9 CM
BH CV ECHO MEAS - LVIDS: 3.4 CM
BH CV ECHO MEAS - LVOT AREA: 2.5 CM^2
BH CV ECHO MEAS - LVOT DIAM: 1.8 CM
BH CV ECHO MEAS - LVPWD: 0.85 CM
BH CV ECHO MEAS - MV E MAX VEL: 134.9 CM/SEC
BH CV ECHO MEAS - MV MAX PG: 10.7 MMHG
BH CV ECHO MEAS - MV MEAN PG: 5.6 MMHG
BH CV ECHO MEAS - MV V2 MAX: 163.2 CM/SEC
BH CV ECHO MEAS - MV V2 MEAN: 111.7 CM/SEC
BH CV ECHO MEAS - MV V2 VTI: 29.4 CM
BH CV ECHO MEAS - MVA(VTI): 1.2 CM^2
BH CV ECHO MEAS - PA ACC TIME: 0.08 SEC
BH CV ECHO MEAS - PA MAX PG (FULL): 0 MMHG
BH CV ECHO MEAS - PA MAX PG: 1.7 MMHG
BH CV ECHO MEAS - PA PR(ACCEL): 43.2 MMHG
BH CV ECHO MEAS - PA V2 MAX: 65.4 CM/SEC
BH CV ECHO MEAS - PI MAX PG: 0.01 MMHG
BH CV ECHO MEAS - PI MAX VEL: 5.3 CM/SEC
BH CV ECHO MEAS - RAP SYSTOLE: 8 MMHG
BH CV ECHO MEAS - RV MAX PG: 1.7 MMHG
BH CV ECHO MEAS - RV MEAN PG: 1 MMHG
BH CV ECHO MEAS - RV V1 MAX: 65.4 CM/SEC
BH CV ECHO MEAS - RV V1 MEAN: 48.5 CM/SEC
BH CV ECHO MEAS - RV V1 VTI: 10.3 CM
BH CV ECHO MEAS - RVDD: 2.6 CM
BH CV ECHO MEAS - RVSP: 38.4 MMHG
BH CV ECHO MEAS - SI(AO): 54.8 ML/M^2
BH CV ECHO MEAS - SI(CUBED): 50.8 ML/M^2
BH CV ECHO MEAS - SI(LVOT): 21.6 ML/M^2
BH CV ECHO MEAS - SI(MOD-SP4): 23.2 ML/M^2
BH CV ECHO MEAS - SI(TEICH): 42.1 ML/M^2
BH CV ECHO MEAS - SV(AO): 88.3 ML
BH CV ECHO MEAS - SV(CUBED): 81.8 ML
BH CV ECHO MEAS - SV(LVOT): 34.8 ML
BH CV ECHO MEAS - SV(MOD-SP4): 37.4 ML
BH CV ECHO MEAS - SV(TEICH): 67.9 ML
BH CV ECHO MEAS - TR MAX VEL: 274.8 CM/SEC
BH CV STRESS COMMENTS STAGE 1: NORMAL
BH CV STRESS DOSE REGADENOSON STAGE 1: 0.4
BH CV STRESS DURATION MIN STAGE 1: 0
BH CV STRESS DURATION SEC STAGE 1: 10
BH CV STRESS PROTOCOL 1: NORMAL
BH CV STRESS RECOVERY BP: NORMAL MMHG
BH CV STRESS RECOVERY HR: 76 BPM
BH CV STRESS STAGE 1: 1
MAXIMAL PREDICTED HEART RATE: 147 BPM
MAXIMAL PREDICTED HEART RATE: 147 BPM
STRESS BASELINE BP: NORMAL MMHG
STRESS BASELINE HR: 61 BPM
STRESS TARGET HR: 125 BPM
STRESS TARGET HR: 125 BPM

## 2020-06-25 PROCEDURE — 78452 HT MUSCLE IMAGE SPECT MULT: CPT | Performed by: INTERNAL MEDICINE

## 2020-06-25 PROCEDURE — 78452 HT MUSCLE IMAGE SPECT MULT: CPT

## 2020-06-25 PROCEDURE — 0 TECHNETIUM SESTAMIBI: Performed by: INTERNAL MEDICINE

## 2020-06-25 PROCEDURE — 93018 CV STRESS TEST I&R ONLY: CPT | Performed by: INTERNAL MEDICINE

## 2020-06-25 PROCEDURE — 93306 TTE W/DOPPLER COMPLETE: CPT | Performed by: INTERNAL MEDICINE

## 2020-06-25 PROCEDURE — 99213 OFFICE O/P EST LOW 20 MIN: CPT | Performed by: INTERNAL MEDICINE

## 2020-06-25 PROCEDURE — A9500 TC99M SESTAMIBI: HCPCS | Performed by: INTERNAL MEDICINE

## 2020-06-25 PROCEDURE — 93306 TTE W/DOPPLER COMPLETE: CPT

## 2020-06-25 PROCEDURE — 93017 CV STRESS TEST TRACING ONLY: CPT

## 2020-06-25 PROCEDURE — 93016 CV STRESS TEST SUPVJ ONLY: CPT | Performed by: INTERNAL MEDICINE

## 2020-06-25 PROCEDURE — 25010000002 REGADENOSON 0.4 MG/5ML SOLUTION: Performed by: INTERNAL MEDICINE

## 2020-06-25 RX ADMIN — TECHNETIUM TC 99M SESTAMIBI 1 DOSE: 1 INJECTION INTRAVENOUS at 13:00

## 2020-06-25 RX ADMIN — REGADENOSON 0.4 MG: 0.08 INJECTION, SOLUTION INTRAVENOUS at 14:45

## 2020-06-25 NOTE — PROGRESS NOTES
Encounter Date:06/25/2020  Last seen 6/4/2020      Patient ID: Melody Hernandez is a 74 y.o. female.    Chief Complaint:  Recent shortness of breath and tiredness- follow-up status post mitral valve replacement  Left bundle branch block  Chronic atrial fibrillation  Hypertension  Dyslipidemia  Anticoagulation management     History of Present Illness  Patient has been having progressively worsening recent shortness of breath with activities such as walking to the mailbox and extreme fatigue and having palpitations.  Symptoms somewhat similar to what she was having prior to valve replacement.  No chest pains  Shortness of breath is better since last visit     Since I have last seen, the patient has been without any chest discomfort , palpitations, dizziness or syncope.  Denies having any headache ,abdominal pain ,nausea, vomiting , diarrhea constipation, loss of weight or loss of appetite.  Denies having any excessive bruising ,hematuria or blood in the stool.     Review of all systems negative except as indicated     Assessment and Plan         ///////////////////  Impression  ==============  -Recent onset of significant shortness of breath with activity and fatigue-better      - Status post mitral valve replacement with [employer] mechanical heart valve and pulmonary vein isolation for atrial fibrillation May 2010 (patient had history of significant mitral valve prolapse and mitral regurgitation).     -   Persistent and chronic atrial fibrillation . Status post electrical cardioversion 05/24/2013 and 05/13/2016.   Patient is in atrial fibrillation today.     -moderate tricuspid regurgitation     - left bundle-branch block    Lexiscan Cardiolite test-normal 6/25/2020  Echocardiogram 6/25/2020 revealed biatrial enlargement structurally and functionally normal mitral prosthetic valve moderate tricuspid regurgitation.  Left ventricle ejection fraction is 60%.-  Exertional shortness of breath and fatigue  -stable and  improved     Lexiscan Cardiolite test is negative for myocardial ischemia 10/25/2016.  Echocardiogram 10/25/2016 revealed normally functioning prosthetic mitral valve biatrial enlargement moderate mitral regurgitation and thickened aortic valve.     - Dyslipidemia and hypertension      - Smoker      - Family history of coronary artery disease  .   - allergy to Cipro and codeine     -history of intolerance to amiodarone.  ============  Plan  ================  EKG showed atrial fibrillation with controlled ventricular response.  Recent shortness of breath and fatigue.-Improved  Anticoagulation status was reviewed.  Home monitoring.  INR is around 2.3  continue atenolol 50 mg twice a day and lisinopril 10 mg for blood pressure more than 150.  Patient is off Lanoxin.    Continue Coumadin and baby aspirin.  Continue Lipitor  medications were reviewed and updated.  Stress Cardiolite test-normal 6/25/2020  Echocardiogram.-As above  Patient to have labs including CMP CBC TSH magnesium level.  Recent labs were reviewed.  All were normal except for mildly elevated proBNP.  Followup in the office in 6 months  Further plan will depend on patient's progress.  [[[[[[[[[[[[[[[[[[[[[[[[[                            Diagnosis Plan   1. S/P MVR (mitral valve replacement)     2. Shortness of breath     3. Essential hypertension     4. Dyslipidemia     5. Long term (current) use of anticoagulants     LAB RESULTS (LAST 7 DAYS)    CBC        BMP        CMP         BNP        TROPONIN        CoAg        Creatinine Clearance  Estimated Creatinine Clearance: 45.8 mL/min (A) (by C-G formula based on SCr of 1.02 mg/dL (H)).    ABG        Radiology  No radiology results for the last day                The following portions of the patient's history were reviewed and updated as appropriate: allergies, current medications, past family history, past medical history, past social history, past surgical history and problem list.    Review of Systems    Constitution: Negative for malaise/fatigue.   Cardiovascular: Negative for chest pain, leg swelling, palpitations and syncope.   Respiratory: Negative for shortness of breath.    Skin: Negative for rash.   Gastrointestinal: Negative for nausea and vomiting.   Neurological: Negative for dizziness, light-headedness and numbness.         Current Outpatient Medications:   •  aspirin (ASPIR-LOW) 81 MG EC tablet, ASPIR-LOW 81 MG TBEC, Disp: , Rfl:   •  atenolol (TENORMIN) 100 MG tablet, Take 1/2 (one-half) tablet by mouth twice daily, Disp: 90 tablet, Rfl: 3  •  atorvastatin (LIPITOR) 10 MG tablet, Take 10 mg by mouth Daily., Disp: , Rfl: 3  •  furosemide (LASIX) 20 MG tablet, Take 20 mg by mouth Daily., Disp: , Rfl:   •  lisinopril (PRINIVIL,ZESTRIL) 20 MG tablet, Take 10 mg by mouth 2 (Two) Times a Day., Disp: , Rfl:   •  warfarin (COUMADIN) 5 MG tablet, WARFARIN SODIUM 5 MG TABS, Disp: , Rfl:   No current facility-administered medications for this visit.     Facility-Administered Medications Ordered in Other Visits:   •  [COMPLETED] regadenoson (LEXISCAN) injection 0.4 mg, 0.4 mg, Intravenous, Once in imaging, Ahmet Lynn MD, 0.4 mg at 06/25/20 1445    Allergies   Allergen Reactions   • Ciprofloxacin Unknown (See Comments)   • Codeine Unknown (See Comments)       Family History   Problem Relation Age of Onset   • Heart disease Mother    • Heart attack Mother    • Hypertension Mother        Past Surgical History:   Procedure Laterality Date   • APPENDECTOMY     • CARDIOVERSION     • CHOLECYSTECTOMY     • MITRAL VALVE REPLACEMENT  05/20/2010    Mechanical    • TONSILLECTOMY         Past Medical History:   Diagnosis Date   • Hyperlipidemia    • Mitral valve prolapse        Family History   Problem Relation Age of Onset   • Heart disease Mother    • Heart attack Mother    • Hypertension Mother        Social History     Socioeconomic History   • Marital status:      Spouse name: Not on file   • Number of  "children: Not on file   • Years of education: Not on file   • Highest education level: Not on file   Tobacco Use   • Smoking status: Former Smoker     Types: Cigarettes     Last attempt to quit: 2009     Years since quittin.4   • Smokeless tobacco: Never Used   Substance and Sexual Activity   • Alcohol use: Yes     Comment: social         Procedures      Objective:       Physical Exam    /80   Pulse 59   Ht 160 cm (63\")   Wt 59 kg (130 lb)   BMI 23.03 kg/m²   The patient is alert, oriented and in no distress.    Vital signs as noted above.    Head and neck revealed no carotid bruits or jugular venous distension.  No thyromegaly or lymphadenopathy is present.    Lungs clear.  No wheezing.  Breath sounds are normal bilaterally.    Heart normal and crisp prosthetic valve sounds.No murmur..  No pericardial rub is present.  No gallop is present.    Abdomen soft and nontender.  No organomegaly is present.    Extremities revealed good peripheral pulses without any pedal edema.    Skin warm and dry.    Musculoskeletal system is grossly normal.    CNS grossly normal.        "

## 2020-06-29 ENCOUNTER — ANTICOAGULATION VISIT (OUTPATIENT)
Dept: CARDIOLOGY | Facility: CLINIC | Age: 75
End: 2020-06-29

## 2020-06-29 DIAGNOSIS — Z79.01 LONG TERM (CURRENT) USE OF ANTICOAGULANTS: ICD-10-CM

## 2020-06-29 DIAGNOSIS — Z95.2 HX OF AORTIC VALVE REPLACEMENT, MECHANICAL: ICD-10-CM

## 2020-06-29 LAB — INR PPP: 3.7

## 2020-06-30 ENCOUNTER — TELEPHONE (OUTPATIENT)
Dept: CARDIOLOGY | Facility: CLINIC | Age: 75
End: 2020-06-30

## 2020-06-30 RX ORDER — LISINOPRIL 20 MG/1
TABLET ORAL
Qty: 180 TABLET | Refills: 0 | OUTPATIENT
Start: 2020-06-30

## 2020-06-30 RX ORDER — LISINOPRIL 10 MG/1
10 TABLET ORAL EVERY EVENING
COMMUNITY
End: 2022-10-15 | Stop reason: HOSPADM

## 2020-06-30 NOTE — TELEPHONE ENCOUNTER
Spoke with pt, she has been taking 20mg BID for at least 6 months per her PCP instructions but did not tell Dr Lynn at her recent visit. Refills should go to PCP Dr Mancera- pt med list is updated.

## 2020-07-13 ENCOUNTER — ANTICOAGULATION VISIT (OUTPATIENT)
Dept: CARDIOLOGY | Facility: CLINIC | Age: 75
End: 2020-07-13

## 2020-07-13 DIAGNOSIS — Z79.01 LONG TERM (CURRENT) USE OF ANTICOAGULANTS: ICD-10-CM

## 2020-07-13 DIAGNOSIS — Z95.2 HX OF AORTIC VALVE REPLACEMENT, MECHANICAL: ICD-10-CM

## 2020-07-13 LAB — INR PPP: 4.6

## 2020-07-17 ENCOUNTER — ANTICOAGULATION VISIT (OUTPATIENT)
Dept: CARDIOLOGY | Facility: CLINIC | Age: 75
End: 2020-07-17

## 2020-07-17 DIAGNOSIS — Z95.2 HX OF AORTIC VALVE REPLACEMENT, MECHANICAL: ICD-10-CM

## 2020-07-17 DIAGNOSIS — Z79.01 LONG TERM (CURRENT) USE OF ANTICOAGULANTS: ICD-10-CM

## 2020-07-17 LAB — INR PPP: 3.4

## 2020-07-31 ENCOUNTER — ANTICOAGULATION VISIT (OUTPATIENT)
Dept: CARDIOLOGY | Facility: CLINIC | Age: 75
End: 2020-07-31

## 2020-07-31 DIAGNOSIS — Z95.2 HX OF AORTIC VALVE REPLACEMENT, MECHANICAL: ICD-10-CM

## 2020-07-31 DIAGNOSIS — Z79.01 LONG TERM (CURRENT) USE OF ANTICOAGULANTS: ICD-10-CM

## 2020-07-31 LAB — INR PPP: 3

## 2020-08-17 ENCOUNTER — ANTICOAGULATION VISIT (OUTPATIENT)
Dept: CARDIOLOGY | Facility: CLINIC | Age: 75
End: 2020-08-17

## 2020-08-17 DIAGNOSIS — Z79.01 LONG TERM (CURRENT) USE OF ANTICOAGULANTS: ICD-10-CM

## 2020-08-17 DIAGNOSIS — Z95.2 HX OF AORTIC VALVE REPLACEMENT, MECHANICAL: ICD-10-CM

## 2020-08-17 LAB — INR PPP: 3

## 2020-09-04 ENCOUNTER — ANTICOAGULATION VISIT (OUTPATIENT)
Dept: CARDIOLOGY | Facility: CLINIC | Age: 75
End: 2020-09-04

## 2020-09-04 DIAGNOSIS — Z95.2 HX OF AORTIC VALVE REPLACEMENT, MECHANICAL: ICD-10-CM

## 2020-09-04 DIAGNOSIS — Z79.01 LONG TERM (CURRENT) USE OF ANTICOAGULANTS: ICD-10-CM

## 2020-09-04 LAB — INR PPP: 1.7

## 2020-09-21 ENCOUNTER — ANTICOAGULATION VISIT (OUTPATIENT)
Dept: CARDIOLOGY | Facility: CLINIC | Age: 75
End: 2020-09-21

## 2020-09-21 DIAGNOSIS — Z79.01 LONG TERM (CURRENT) USE OF ANTICOAGULANTS: ICD-10-CM

## 2020-09-21 DIAGNOSIS — Z95.2 HX OF AORTIC VALVE REPLACEMENT, MECHANICAL: ICD-10-CM

## 2020-09-21 LAB — INR PPP: 2.4

## 2020-10-05 ENCOUNTER — ANTICOAGULATION VISIT (OUTPATIENT)
Dept: CARDIOLOGY | Facility: CLINIC | Age: 75
End: 2020-10-05

## 2020-10-05 DIAGNOSIS — Z95.2 HX OF AORTIC VALVE REPLACEMENT, MECHANICAL: ICD-10-CM

## 2020-10-05 DIAGNOSIS — Z79.01 LONG TERM (CURRENT) USE OF ANTICOAGULANTS: ICD-10-CM

## 2020-10-05 LAB — INR PPP: 2.6

## 2020-10-19 ENCOUNTER — ANTICOAGULATION VISIT (OUTPATIENT)
Dept: CARDIOLOGY | Facility: CLINIC | Age: 75
End: 2020-10-19

## 2020-10-19 DIAGNOSIS — Z79.01 LONG TERM (CURRENT) USE OF ANTICOAGULANTS: ICD-10-CM

## 2020-10-19 DIAGNOSIS — Z95.2 HX OF AORTIC VALVE REPLACEMENT, MECHANICAL: ICD-10-CM

## 2020-10-19 LAB — INR PPP: 2.3

## 2020-11-02 ENCOUNTER — ANTICOAGULATION VISIT (OUTPATIENT)
Dept: CARDIOLOGY | Facility: CLINIC | Age: 75
End: 2020-11-02

## 2020-11-02 DIAGNOSIS — Z79.01 LONG TERM (CURRENT) USE OF ANTICOAGULANTS: ICD-10-CM

## 2020-11-02 DIAGNOSIS — Z95.2 HX OF AORTIC VALVE REPLACEMENT, MECHANICAL: ICD-10-CM

## 2020-11-02 LAB — INR PPP: 2.1

## 2020-11-16 ENCOUNTER — ANTICOAGULATION VISIT (OUTPATIENT)
Dept: CARDIOLOGY | Facility: CLINIC | Age: 75
End: 2020-11-16

## 2020-11-16 DIAGNOSIS — Z79.01 LONG TERM (CURRENT) USE OF ANTICOAGULANTS: ICD-10-CM

## 2020-11-16 DIAGNOSIS — Z95.2 HX OF AORTIC VALVE REPLACEMENT, MECHANICAL: ICD-10-CM

## 2020-11-16 LAB — INR PPP: 2.3

## 2020-12-01 ENCOUNTER — ANTICOAGULATION VISIT (OUTPATIENT)
Dept: CARDIOLOGY | Facility: CLINIC | Age: 75
End: 2020-12-01

## 2020-12-01 DIAGNOSIS — Z79.01 LONG TERM (CURRENT) USE OF ANTICOAGULANTS: ICD-10-CM

## 2020-12-01 DIAGNOSIS — Z95.2 HX OF AORTIC VALVE REPLACEMENT, MECHANICAL: ICD-10-CM

## 2020-12-01 LAB — INR PPP: 2.5

## 2020-12-14 ENCOUNTER — ANTICOAGULATION VISIT (OUTPATIENT)
Dept: CARDIOLOGY | Facility: CLINIC | Age: 75
End: 2020-12-14

## 2020-12-14 DIAGNOSIS — Z95.2 HX OF AORTIC VALVE REPLACEMENT, MECHANICAL: ICD-10-CM

## 2020-12-14 DIAGNOSIS — Z79.01 LONG TERM (CURRENT) USE OF ANTICOAGULANTS: ICD-10-CM

## 2020-12-14 LAB — INR PPP: 1.8

## 2020-12-28 ENCOUNTER — ANTICOAGULATION VISIT (OUTPATIENT)
Dept: CARDIOLOGY | Facility: CLINIC | Age: 75
End: 2020-12-28

## 2020-12-28 DIAGNOSIS — Z95.2 HX OF AORTIC VALVE REPLACEMENT, MECHANICAL: ICD-10-CM

## 2020-12-28 DIAGNOSIS — Z79.01 LONG TERM (CURRENT) USE OF ANTICOAGULANTS: ICD-10-CM

## 2020-12-28 LAB — INR PPP: 2.5

## 2021-01-11 ENCOUNTER — ANTICOAGULATION VISIT (OUTPATIENT)
Dept: CARDIOLOGY | Facility: CLINIC | Age: 76
End: 2021-01-11

## 2021-01-11 DIAGNOSIS — Z95.2 HX OF AORTIC VALVE REPLACEMENT, MECHANICAL: ICD-10-CM

## 2021-01-11 DIAGNOSIS — Z79.01 LONG TERM (CURRENT) USE OF ANTICOAGULANTS: ICD-10-CM

## 2021-01-11 LAB — INR PPP: 2.4

## 2021-01-11 RX ORDER — ATENOLOL 100 MG/1
TABLET ORAL
Qty: 90 TABLET | Refills: 3 | Status: SHIPPED | OUTPATIENT
Start: 2021-01-11 | End: 2022-01-07

## 2021-01-19 ENCOUNTER — OFFICE VISIT (OUTPATIENT)
Dept: CARDIOLOGY | Facility: CLINIC | Age: 76
End: 2021-01-19

## 2021-01-19 VITALS
BODY MASS INDEX: 23.57 KG/M2 | DIASTOLIC BLOOD PRESSURE: 92 MMHG | TEMPERATURE: 97.3 F | HEART RATE: 76 BPM | SYSTOLIC BLOOD PRESSURE: 159 MMHG | WEIGHT: 133 LBS | OXYGEN SATURATION: 96 % | HEIGHT: 63 IN

## 2021-01-19 DIAGNOSIS — Z95.2 HISTORY OF MITRAL VALVE REPLACEMENT: ICD-10-CM

## 2021-01-19 DIAGNOSIS — I10 ESSENTIAL HYPERTENSION: ICD-10-CM

## 2021-01-19 DIAGNOSIS — I48.20 ATRIAL FIBRILLATION, CHRONIC (HCC): ICD-10-CM

## 2021-01-19 DIAGNOSIS — Z95.2 S/P MVR (MITRAL VALVE REPLACEMENT): ICD-10-CM

## 2021-01-19 DIAGNOSIS — E78.5 DYSLIPIDEMIA: ICD-10-CM

## 2021-01-19 DIAGNOSIS — Z79.01 LONG TERM (CURRENT) USE OF ANTICOAGULANTS: Primary | ICD-10-CM

## 2021-01-19 PROCEDURE — 99214 OFFICE O/P EST MOD 30 MIN: CPT | Performed by: INTERNAL MEDICINE

## 2021-01-19 PROCEDURE — 93000 ELECTROCARDIOGRAM COMPLETE: CPT | Performed by: INTERNAL MEDICINE

## 2021-01-19 NOTE — PROGRESS NOTES
Encounter Date:01/19/2021  Last seen 6/25/2020      Patient ID: Melody Hernandez is a 75 y.o. female.    Chief Complaint:  Status post mitral valve replacement  Left bundle branch block  Chronic atrial fibrillation  Hypertension  Dyslipidemia  Anticoagulation management     History of Present Illness  Occasional positional dizziness  Since I have last seen, the patient has been without any chest discomfort ,shortness of breath, palpitations, or syncope.  Denies having any headache ,abdominal pain ,nausea, vomiting , diarrhea constipation, loss of weight or loss of appetite.  Denies having any excessive bruising ,hematuria or blood in the stool.    Review of all systems negative except as indicated.    Reviewed ROS.  Assessment and Plan         ///////////////////  Impression  ==============   - Status post mitral valve replacement with [employer] mechanical heart valve and pulmonary vein isolation for atrial fibrillation May 2010 (patient had history of significant mitral valve prolapse and mitral regurgitation).     -   Persistent and chronic atrial fibrillation . Status post electrical cardioversion 05/24/2013 and 05/13/2016.   Patient is in atrial fibrillation today.     -moderate tricuspid regurgitation     - left bundle-branch block     Lexiscan Cardiolite test-normal 6/25/2020  Echocardiogram 6/25/2020 revealed biatrial enlargement structurally and functionally normal mitral prosthetic valve moderate tricuspid regurgitation.  Left ventricle ejection fraction is 60%.-  Exertional shortness of breath and fatigue  -stable and improved     Lexiscan Cardiolite test is negative for myocardial ischemia 10/25/2016.  Echocardiogram 10/25/2016 revealed normally functioning prosthetic mitral valve biatrial enlargement moderate mitral regurgitation and thickened aortic valve.     - Dyslipidemia and hypertension      - Smoker      - Family history of coronary artery disease  .   - allergy to Cipro and codeine     -history  of intolerance to amiodarone.  ============  Plan  ================  Status post mitral valve replacement  Patient is not having any angina pectoris or congestive heart failure.    Anticoagulation status reviewed.  Home monitoring.  INR data was reviewed.  Continue Coumadin.    Chronic atrial fibrillation-rate is well controlled.  EKG showed atrial fibrillation with controlled ventricular response.    Dyslipidemia-continue atorvastatin    Hypertension-159/92    Occasional positional dizziness    Occasions were reviewed and updated.  Continue atenolol 50 mg twice a day and lisinopril 10 mg for blood pressure more than 150.  Patient is off Lanoxin.    Continue Coumadin and baby aspirin.  Continue Lipitor    Followup in the office in 6 months    Further plan will depend on patient's progress.  [[[[[[[[[[[[[[[[[[[[[[[[[                      Diagnosis Plan   1. Long term (current) use of anticoagulants     2. S/P MVR (mitral valve replacement)     3. Essential hypertension     4. Dyslipidemia     5. History of mitral valve replacement     6. Atrial fibrillation, chronic (CMS/HCC)     LAB RESULTS (LAST 7 DAYS)    CBC        BMP        CMP         BNP        TROPONIN        CoAg        Creatinine Clearance  CrCl cannot be calculated (Patient's most recent lab result is older than the maximum 30 days allowed.).    ABG        Radiology  No radiology results for the last day                The following portions of the patient's history were reviewed and updated as appropriate: allergies, current medications, past family history, past medical history, past social history, past surgical history and problem list.    Review of Systems   Constitution: Negative for malaise/fatigue.   Cardiovascular: Negative for chest pain, leg swelling, palpitations and syncope.   Respiratory: Negative for shortness of breath.    Skin: Negative for rash.   Gastrointestinal: Negative for nausea and vomiting.   Neurological: Negative for dizziness,  light-headedness and numbness.         Current Outpatient Medications:   •  aspirin (ASPIR-LOW) 81 MG EC tablet, ASPIR-LOW 81 MG TBEC, Disp: , Rfl:   •  atenolol (TENORMIN) 100 MG tablet, Take 1/2 (one-half) tablet by mouth twice daily, Disp: 90 tablet, Rfl: 3  •  atorvastatin (LIPITOR) 10 MG tablet, Take 10 mg by mouth Daily., Disp: , Rfl: 3  •  lisinopril (PRINIVIL,ZESTRIL) 5 MG tablet, Take 20 mg by mouth Daily. Dose increased approx 2020 per pt., Disp: , Rfl:   •  warfarin (COUMADIN) 5 MG tablet, WARFARIN SODIUM 5 MG TABS, Disp: , Rfl:   •  furosemide (LASIX) 20 MG tablet, Take 20 mg by mouth Daily., Disp: , Rfl:     Allergies   Allergen Reactions   • Ciprofloxacin Unknown (See Comments)   • Codeine Unknown (See Comments)       Family History   Problem Relation Age of Onset   • Heart disease Mother    • Heart attack Mother    • Hypertension Mother        Past Surgical History:   Procedure Laterality Date   • APPENDECTOMY     • CARDIOVERSION     • CHOLECYSTECTOMY     • MITRAL VALVE REPLACEMENT  2010    Mechanical    • TONSILLECTOMY         Past Medical History:   Diagnosis Date   • Hyperlipidemia    • Mitral valve prolapse        Family History   Problem Relation Age of Onset   • Heart disease Mother    • Heart attack Mother    • Hypertension Mother        Social History     Socioeconomic History   • Marital status:      Spouse name: Not on file   • Number of children: Not on file   • Years of education: Not on file   • Highest education level: Not on file   Tobacco Use   • Smoking status: Former Smoker     Types: Cigarettes     Quit date:      Years since quittin.0   • Smokeless tobacco: Never Used   Substance and Sexual Activity   • Alcohol use: Yes     Comment: social           ECG 12 Lead    Date/Time: 2021 11:54 AM  Performed by: Ahmet Lynn MD  Authorized by: Ahmet Lynn MD   Comparison: compared with previous ECG   Similar to previous ECG  Comparison to previous  "ECG: Atrial fibrillation with controlled ventricular response 70/min nonspecific ST-T wave changes normal axis normal intervals no ectopy no change from previous EKG                Objective:       Physical Exam    /92 (BP Location: Left arm, Patient Position: Sitting, Cuff Size: Adult)   Pulse 76   Temp 97.3 °F (36.3 °C)   Ht 160 cm (63\")   Wt 60.3 kg (133 lb)   SpO2 96%   BMI 23.56 kg/m²   The patient is alert, oriented and in no distress.    Vital signs as noted above.    Head and neck revealed no carotid bruits or jugular venous distension.  No thyromegaly or lymphadenopathy is present.    Lungs clear.  No wheezing.  Breath sounds are normal bilaterally.    Heart normal and crisp prosthetic valve sounds.  No murmur..  No pericardial rub is present.  No gallop is present.    Abdomen soft and nontender.  No organomegaly is present.    Extremities revealed good peripheral pulses without any pedal edema.    Skin warm and dry.    Musculoskeletal system is grossly normal.    CNS grossly normal.    Reviewed and unchanged from last visit.        "

## 2021-01-25 ENCOUNTER — ANTICOAGULATION VISIT (OUTPATIENT)
Dept: CARDIOLOGY | Facility: CLINIC | Age: 76
End: 2021-01-25

## 2021-01-25 DIAGNOSIS — Z79.01 LONG TERM (CURRENT) USE OF ANTICOAGULANTS: ICD-10-CM

## 2021-01-25 DIAGNOSIS — Z95.2 HX OF AORTIC VALVE REPLACEMENT, MECHANICAL: ICD-10-CM

## 2021-01-25 LAB — INR PPP: 2.4

## 2021-02-08 ENCOUNTER — ANTICOAGULATION VISIT (OUTPATIENT)
Dept: CARDIOLOGY | Facility: CLINIC | Age: 76
End: 2021-02-08

## 2021-02-08 DIAGNOSIS — Z79.01 LONG TERM (CURRENT) USE OF ANTICOAGULANTS: ICD-10-CM

## 2021-02-08 DIAGNOSIS — Z95.2 HX OF AORTIC VALVE REPLACEMENT, MECHANICAL: ICD-10-CM

## 2021-02-08 LAB — INR PPP: 2.3

## 2021-02-22 ENCOUNTER — ANTICOAGULATION VISIT (OUTPATIENT)
Dept: CARDIOLOGY | Facility: CLINIC | Age: 76
End: 2021-02-22

## 2021-02-22 DIAGNOSIS — Z95.2 HX OF AORTIC VALVE REPLACEMENT, MECHANICAL: ICD-10-CM

## 2021-02-22 DIAGNOSIS — Z79.01 LONG TERM (CURRENT) USE OF ANTICOAGULANTS: ICD-10-CM

## 2021-02-22 LAB — INR PPP: 1.9

## 2021-03-08 ENCOUNTER — ANTICOAGULATION VISIT (OUTPATIENT)
Dept: CARDIOLOGY | Facility: CLINIC | Age: 76
End: 2021-03-08

## 2021-03-08 DIAGNOSIS — Z79.01 LONG TERM (CURRENT) USE OF ANTICOAGULANTS: ICD-10-CM

## 2021-03-08 DIAGNOSIS — Z95.2 HX OF AORTIC VALVE REPLACEMENT, MECHANICAL: ICD-10-CM

## 2021-03-08 LAB — INR PPP: 2.3

## 2021-03-22 LAB — INR PPP: 2.8

## 2021-03-23 ENCOUNTER — ANTICOAGULATION VISIT (OUTPATIENT)
Dept: CARDIOLOGY | Facility: CLINIC | Age: 76
End: 2021-03-23

## 2021-03-23 DIAGNOSIS — Z79.01 LONG TERM (CURRENT) USE OF ANTICOAGULANTS: ICD-10-CM

## 2021-03-23 DIAGNOSIS — Z95.2 HX OF AORTIC VALVE REPLACEMENT, MECHANICAL: ICD-10-CM

## 2021-04-05 ENCOUNTER — TELEPHONE (OUTPATIENT)
Dept: CARDIOLOGY | Facility: CLINIC | Age: 76
End: 2021-04-05

## 2021-04-05 ENCOUNTER — ANTICOAGULATION VISIT (OUTPATIENT)
Dept: CARDIOLOGY | Facility: CLINIC | Age: 76
End: 2021-04-05

## 2021-04-05 DIAGNOSIS — Z95.2 HX OF AORTIC VALVE REPLACEMENT, MECHANICAL: ICD-10-CM

## 2021-04-05 DIAGNOSIS — Z79.01 LONG TERM (CURRENT) USE OF ANTICOAGULANTS: ICD-10-CM

## 2021-04-05 LAB — INR PPP: 5.8

## 2021-04-08 ENCOUNTER — ANTICOAGULATION VISIT (OUTPATIENT)
Dept: CARDIOLOGY | Facility: CLINIC | Age: 76
End: 2021-04-08

## 2021-04-08 DIAGNOSIS — Z95.2 HX OF AORTIC VALVE REPLACEMENT, MECHANICAL: ICD-10-CM

## 2021-04-08 DIAGNOSIS — Z79.01 LONG TERM (CURRENT) USE OF ANTICOAGULANTS: ICD-10-CM

## 2021-04-08 LAB — INR PPP: 3.1

## 2021-04-19 ENCOUNTER — ANTICOAGULATION VISIT (OUTPATIENT)
Dept: CARDIOLOGY | Facility: CLINIC | Age: 76
End: 2021-04-19

## 2021-04-19 DIAGNOSIS — Z95.2 HX OF AORTIC VALVE REPLACEMENT, MECHANICAL: Primary | ICD-10-CM

## 2021-04-19 DIAGNOSIS — Z79.01 LONG TERM (CURRENT) USE OF ANTICOAGULANTS: ICD-10-CM

## 2021-04-19 LAB — INR PPP: 3.9

## 2021-05-03 ENCOUNTER — ANTICOAGULATION VISIT (OUTPATIENT)
Dept: CARDIOLOGY | Facility: CLINIC | Age: 76
End: 2021-05-03

## 2021-05-03 DIAGNOSIS — Z95.2 HX OF AORTIC VALVE REPLACEMENT, MECHANICAL: Primary | ICD-10-CM

## 2021-05-03 DIAGNOSIS — Z79.01 LONG TERM (CURRENT) USE OF ANTICOAGULANTS: ICD-10-CM

## 2021-05-03 LAB — INR PPP: 2.1

## 2021-05-19 ENCOUNTER — ANTICOAGULATION VISIT (OUTPATIENT)
Dept: CARDIOLOGY | Facility: CLINIC | Age: 76
End: 2021-05-19

## 2021-05-19 DIAGNOSIS — Z79.01 LONG TERM (CURRENT) USE OF ANTICOAGULANTS: ICD-10-CM

## 2021-05-19 DIAGNOSIS — Z95.2 HX OF AORTIC VALVE REPLACEMENT, MECHANICAL: Primary | ICD-10-CM

## 2021-05-19 LAB — INR PPP: 1.8

## 2021-06-01 ENCOUNTER — ANTICOAGULATION VISIT (OUTPATIENT)
Dept: CARDIOLOGY | Facility: CLINIC | Age: 76
End: 2021-06-01

## 2021-06-01 DIAGNOSIS — Z79.01 LONG TERM (CURRENT) USE OF ANTICOAGULANTS: ICD-10-CM

## 2021-06-01 DIAGNOSIS — Z95.2 HX OF AORTIC VALVE REPLACEMENT, MECHANICAL: Primary | ICD-10-CM

## 2021-06-01 LAB — INR PPP: 3.8

## 2021-06-14 ENCOUNTER — ANTICOAGULATION VISIT (OUTPATIENT)
Dept: CARDIOLOGY | Facility: CLINIC | Age: 76
End: 2021-06-14

## 2021-06-14 DIAGNOSIS — Z79.01 LONG TERM (CURRENT) USE OF ANTICOAGULANTS: ICD-10-CM

## 2021-06-14 DIAGNOSIS — Z95.2 HX OF AORTIC VALVE REPLACEMENT, MECHANICAL: Primary | ICD-10-CM

## 2021-06-14 LAB — INR PPP: 2.8

## 2021-06-28 ENCOUNTER — ANTICOAGULATION VISIT (OUTPATIENT)
Dept: CARDIOLOGY | Facility: CLINIC | Age: 76
End: 2021-06-28

## 2021-06-28 DIAGNOSIS — Z95.2 HX OF AORTIC VALVE REPLACEMENT, MECHANICAL: Primary | ICD-10-CM

## 2021-06-28 DIAGNOSIS — Z79.01 LONG TERM (CURRENT) USE OF ANTICOAGULANTS: ICD-10-CM

## 2021-06-28 LAB — INR PPP: 2.4

## 2021-07-12 ENCOUNTER — ANTICOAGULATION VISIT (OUTPATIENT)
Dept: CARDIOLOGY | Facility: CLINIC | Age: 76
End: 2021-07-12

## 2021-07-12 ENCOUNTER — TELEPHONE (OUTPATIENT)
Dept: CARDIOLOGY | Facility: CLINIC | Age: 76
End: 2021-07-12

## 2021-07-12 DIAGNOSIS — Z79.01 LONG TERM (CURRENT) USE OF ANTICOAGULANTS: ICD-10-CM

## 2021-07-12 DIAGNOSIS — Z95.2 HX OF AORTIC VALVE REPLACEMENT, MECHANICAL: Primary | ICD-10-CM

## 2021-07-12 LAB — INR PPP: 2.2

## 2021-07-26 ENCOUNTER — ANTICOAGULATION VISIT (OUTPATIENT)
Dept: CARDIOLOGY | Facility: CLINIC | Age: 76
End: 2021-07-26

## 2021-07-26 ENCOUNTER — OFFICE VISIT (OUTPATIENT)
Dept: CARDIOLOGY | Facility: CLINIC | Age: 76
End: 2021-07-26

## 2021-07-26 VITALS
DIASTOLIC BLOOD PRESSURE: 93 MMHG | WEIGHT: 124.5 LBS | BODY MASS INDEX: 22.06 KG/M2 | OXYGEN SATURATION: 97 % | HEIGHT: 63 IN | SYSTOLIC BLOOD PRESSURE: 141 MMHG

## 2021-07-26 DIAGNOSIS — Z95.2 HX OF AORTIC VALVE REPLACEMENT, MECHANICAL: Primary | ICD-10-CM

## 2021-07-26 DIAGNOSIS — Z79.01 LONG TERM (CURRENT) USE OF ANTICOAGULANTS: ICD-10-CM

## 2021-07-26 DIAGNOSIS — E78.5 DYSLIPIDEMIA: ICD-10-CM

## 2021-07-26 DIAGNOSIS — I48.20 ATRIAL FIBRILLATION, CHRONIC (HCC): ICD-10-CM

## 2021-07-26 DIAGNOSIS — Z95.2 S/P MVR (MITRAL VALVE REPLACEMENT): ICD-10-CM

## 2021-07-26 DIAGNOSIS — I10 ESSENTIAL HYPERTENSION: Primary | ICD-10-CM

## 2021-07-26 LAB — INR PPP: 2.2

## 2021-07-26 PROCEDURE — 93000 ELECTROCARDIOGRAM COMPLETE: CPT | Performed by: INTERNAL MEDICINE

## 2021-07-26 PROCEDURE — 99214 OFFICE O/P EST MOD 30 MIN: CPT | Performed by: INTERNAL MEDICINE

## 2021-07-26 NOTE — PROGRESS NOTES
Encounter Date:07/26/2021  Last seen 1/19/2021      Patient ID: Melody Hernandez is a 75 y.o. female.    Chief Complaint:  Status post mitral valve replacement  Left bundle branch block  Chronic atrial fibrillation  Hypertension  Dyslipidemia  Anticoagulation management     History of Present Illness  Since I have last seen, the patient has been without any chest discomfort ,shortness of breath, palpitations, dizziness or syncope.  Denies having any headache ,abdominal pain ,nausea, vomiting , diarrhea constipation, loss of weight or loss of appetite.  Denies having any excessive bruising ,hematuria or blood in the stool.    Review of all systems negative except as indicated.    Reviewed ROS.    Assessment and Plan         ///////////////////  Impression  ==============   - Status post mitral valve replacement with [employer] mechanical heart valve and pulmonary vein isolation for atrial fibrillation May 2010 (patient had history of significant mitral valve prolapse and mitral regurgitation).     -   Persistent and chronic atrial fibrillation . Status post electrical cardioversion 05/24/2013 and 05/13/2016.   Patient is in atrial fibrillation today.     -moderate tricuspid regurgitation     - left bundle-branch block     Lexiscan Cardiolite test-normal 6/25/2020  Echocardiogram 6/25/2020 revealed biatrial enlargement structurally and functionally normal mitral prosthetic valve moderate tricuspid regurgitation.  Left ventricle ejection fraction is 60%.-  Exertional shortness of breath and fatigue  -stable and improved     Lexiscan Cardiolite test is negative for myocardial ischemia 10/25/2016.  Echocardiogram 10/25/2016 revealed normally functioning prosthetic mitral valve biatrial enlargement moderate mitral regurgitation and thickened aortic valve.     - Dyslipidemia and hypertension      - Smoker      - Family history of coronary artery disease  .   - allergy to Cipro and codeine     -history of intolerance to  amiodarone.  ============  Plan  ================  Status post mitral valve replacement  Patient is not having any angina pectoris or congestive heart failure.     Anticoagulation status reviewed.  Home monitoring.  INR data was reviewed.  Continue Coumadin.  Recent INR 2.2.  Patient was asked to take extra 2.5 mg 1 day a week if INR is less than 2.5.     Chronic atrial fibrillation-rate is well controlled.  EKG showed atrial fibrillation with controlled ventricular response.     Dyslipidemia-continue atorvastatin     Hypertension-140/90    Occasional positional dizziness     Occasions were reviewed and updated.  Continue atenolol 50 mg twice a day and lisinopril 10 mg for blood pressure more than 150.  Patient is off Lanoxin.    Continue Coumadin and baby aspirin.  Continue Lipitor     Followup in the office in 6 months     Further plan will depend on patient's progress.  [[[[[[[[[[[[[[[[[[[[[[[[[                            Diagnosis Plan   1. Essential hypertension  ECG 12 Lead   2. Dyslipidemia  ECG 12 Lead   3. S/P MVR (mitral valve replacement)  ECG 12 Lead   4. Long term (current) use of anticoagulants  ECG 12 Lead   5. Atrial fibrillation, chronic (CMS/HCC)  ECG 12 Lead   LAB RESULTS (LAST 7 DAYS)    CBC        BMP        CMP         BNP        TROPONIN        CoAg        Creatinine Clearance  CrCl cannot be calculated (Patient's most recent lab result is older than the maximum 30 days allowed.).    ABG        Radiology  No radiology results for the last day                The following portions of the patient's history were reviewed and updated as appropriate: allergies, current medications, past family history, past medical history, past social history, past surgical history and problem list.    Review of Systems   Constitutional: Negative for malaise/fatigue.   Cardiovascular: Negative for chest pain, leg swelling, palpitations and syncope.   Respiratory: Positive for shortness of breath.    Skin: Negative  for rash.   Gastrointestinal: Negative for nausea and vomiting.   Neurological: Negative for dizziness, light-headedness and numbness.         Current Outpatient Medications:   •  aspirin (ASPIR-LOW) 81 MG EC tablet, ASPIR-LOW 81 MG TBEC, Disp: , Rfl:   •  atenolol (TENORMIN) 100 MG tablet, Take 1/2 (one-half) tablet by mouth twice daily, Disp: 90 tablet, Rfl: 3  •  atorvastatin (LIPITOR) 10 MG tablet, Take 10 mg by mouth Daily., Disp: , Rfl: 3  •  furosemide (LASIX) 20 MG tablet, Take 20 mg by mouth Daily., Disp: , Rfl:   •  lisinopril (PRINIVIL,ZESTRIL) 5 MG tablet, Take 20 mg by mouth Daily. Dose increased approx 2020 per pt., Disp: , Rfl:   •  warfarin (COUMADIN) 5 MG tablet, WARFARIN SODIUM 5 MG TABS, Disp: , Rfl:     Allergies   Allergen Reactions   • Ciprofloxacin Unknown (See Comments)   • Codeine Unknown (See Comments)       Family History   Problem Relation Age of Onset   • Heart disease Mother    • Heart attack Mother    • Hypertension Mother        Past Surgical History:   Procedure Laterality Date   • APPENDECTOMY     • CARDIOVERSION     • CHOLECYSTECTOMY     • MITRAL VALVE REPLACEMENT  2010    Mechanical    • TONSILLECTOMY         Past Medical History:   Diagnosis Date   • Hyperlipidemia    • Mitral valve prolapse        Family History   Problem Relation Age of Onset   • Heart disease Mother    • Heart attack Mother    • Hypertension Mother        Social History     Socioeconomic History   • Marital status:      Spouse name: Not on file   • Number of children: Not on file   • Years of education: Not on file   • Highest education level: Not on file   Tobacco Use   • Smoking status: Former Smoker     Types: Cigarettes     Quit date:      Years since quittin.5   • Smokeless tobacco: Never Used   Vaping Use   • Vaping Use: Never used   Substance and Sexual Activity   • Alcohol use: Yes     Comment: social   • Drug use: Never           ECG 12 Lead    Date/Time: 2021 12:53  "PM  Performed by: Ahmet Lynn MD  Authorized by: Ahmet Lynn MD   Comparison: compared with previous ECG   Similar to previous ECG  Comparison to previous ECG: Atrial fibrillation with controlled ventricular response 64/min nonspecific ST-T wave changes right axis deviation no ectopy no significant change from 1/19/2021                Objective:       Physical Exam    /93   Ht 160 cm (63\")   Wt 56.5 kg (124 lb 8 oz)   SpO2 97%   BMI 22.05 kg/m²   The patient is alert, oriented and in no distress.    Vital signs as noted above.    Head and neck revealed no carotid bruits or jugular venous distension.  No thyromegaly or lymphadenopathy is present.    Lungs clear.  No wheezing.  Breath sounds are normal bilaterally.    Heart normal and crisp prosthetic valve sounds.  No murmur..  No pericardial rub is present.  No gallop is present.    Abdomen soft and nontender.  No organomegaly is present.    Extremities revealed good peripheral pulses without any pedal edema.    Skin warm and dry.    Musculoskeletal system is grossly normal.    CNS grossly normal.    Reviewed and unchanged from last visit.        "

## 2021-08-09 ENCOUNTER — ANTICOAGULATION VISIT (OUTPATIENT)
Dept: CARDIOLOGY | Facility: CLINIC | Age: 76
End: 2021-08-09

## 2021-08-09 DIAGNOSIS — Z95.2 HX OF AORTIC VALVE REPLACEMENT, MECHANICAL: Primary | ICD-10-CM

## 2021-08-09 DIAGNOSIS — Z79.01 LONG TERM (CURRENT) USE OF ANTICOAGULANTS: ICD-10-CM

## 2021-08-09 LAB — INR PPP: 2

## 2021-08-23 ENCOUNTER — ANTICOAGULATION VISIT (OUTPATIENT)
Dept: CARDIOLOGY | Facility: CLINIC | Age: 76
End: 2021-08-23

## 2021-08-23 DIAGNOSIS — Z79.01 LONG TERM (CURRENT) USE OF ANTICOAGULANTS: ICD-10-CM

## 2021-08-23 DIAGNOSIS — Z95.2 HX OF AORTIC VALVE REPLACEMENT, MECHANICAL: Primary | ICD-10-CM

## 2021-08-23 LAB — INR PPP: 2.4

## 2021-09-07 ENCOUNTER — ANTICOAGULATION VISIT (OUTPATIENT)
Dept: CARDIOLOGY | Facility: CLINIC | Age: 76
End: 2021-09-07

## 2021-09-07 DIAGNOSIS — Z79.01 LONG TERM (CURRENT) USE OF ANTICOAGULANTS: ICD-10-CM

## 2021-09-07 DIAGNOSIS — Z95.2 HX OF AORTIC VALVE REPLACEMENT, MECHANICAL: Primary | ICD-10-CM

## 2021-09-07 LAB — INR PPP: 3

## 2021-09-23 ENCOUNTER — ANTICOAGULATION VISIT (OUTPATIENT)
Dept: CARDIOLOGY | Facility: CLINIC | Age: 76
End: 2021-09-23

## 2021-09-23 DIAGNOSIS — Z95.2 HX OF AORTIC VALVE REPLACEMENT, MECHANICAL: Primary | ICD-10-CM

## 2021-09-23 DIAGNOSIS — Z79.01 LONG TERM (CURRENT) USE OF ANTICOAGULANTS: ICD-10-CM

## 2021-09-23 LAB — INR PPP: 3.9

## 2021-10-11 ENCOUNTER — ANTICOAGULATION VISIT (OUTPATIENT)
Dept: CARDIOLOGY | Facility: CLINIC | Age: 76
End: 2021-10-11

## 2021-10-11 DIAGNOSIS — Z79.01 LONG TERM (CURRENT) USE OF ANTICOAGULANTS: ICD-10-CM

## 2021-10-11 DIAGNOSIS — Z95.2 HX OF AORTIC VALVE REPLACEMENT, MECHANICAL: Primary | ICD-10-CM

## 2021-10-11 LAB — INR PPP: 3.6

## 2021-10-13 ENCOUNTER — TRANSCRIBE ORDERS (OUTPATIENT)
Dept: ADMINISTRATIVE | Facility: HOSPITAL | Age: 76
End: 2021-10-13

## 2021-10-13 DIAGNOSIS — Z12.31 SCREENING MAMMOGRAM, ENCOUNTER FOR: Primary | ICD-10-CM

## 2021-10-15 ENCOUNTER — HOSPITAL ENCOUNTER (OUTPATIENT)
Dept: MAMMOGRAPHY | Facility: HOSPITAL | Age: 76
Discharge: HOME | End: 2021-10-15
Admitting: FAMILY MEDICINE

## 2021-10-15 DIAGNOSIS — Z12.31 SCREENING MAMMOGRAM, ENCOUNTER FOR: ICD-10-CM

## 2021-10-15 PROCEDURE — 77067 SCR MAMMO BI INCL CAD: CPT

## 2021-10-15 PROCEDURE — 77063 BREAST TOMOSYNTHESIS BI: CPT

## 2021-10-25 ENCOUNTER — ANTICOAGULATION VISIT (OUTPATIENT)
Dept: CARDIOLOGY | Facility: CLINIC | Age: 76
End: 2021-10-25

## 2021-10-25 DIAGNOSIS — Z79.01 LONG TERM (CURRENT) USE OF ANTICOAGULANTS: ICD-10-CM

## 2021-10-25 DIAGNOSIS — Z95.2 HX OF AORTIC VALVE REPLACEMENT, MECHANICAL: Primary | ICD-10-CM

## 2021-10-25 LAB — INR PPP: 3.1

## 2021-11-08 ENCOUNTER — ANTICOAGULATION VISIT (OUTPATIENT)
Dept: CARDIOLOGY | Facility: CLINIC | Age: 76
End: 2021-11-08

## 2021-11-08 DIAGNOSIS — Z95.2 HX OF AORTIC VALVE REPLACEMENT, MECHANICAL: Primary | ICD-10-CM

## 2021-11-08 DIAGNOSIS — Z79.01 LONG TERM (CURRENT) USE OF ANTICOAGULANTS: ICD-10-CM

## 2021-11-08 LAB — INR PPP: 5

## 2021-11-15 ENCOUNTER — ANTICOAGULATION VISIT (OUTPATIENT)
Dept: CARDIOLOGY | Facility: CLINIC | Age: 76
End: 2021-11-15

## 2021-11-15 DIAGNOSIS — Z79.01 LONG TERM (CURRENT) USE OF ANTICOAGULANTS: ICD-10-CM

## 2021-11-15 DIAGNOSIS — Z95.2 HX OF AORTIC VALVE REPLACEMENT, MECHANICAL: Primary | ICD-10-CM

## 2021-11-15 LAB — INR PPP: 2

## 2021-11-29 ENCOUNTER — ANTICOAGULATION VISIT (OUTPATIENT)
Dept: CARDIOLOGY | Facility: CLINIC | Age: 76
End: 2021-11-29

## 2021-11-29 DIAGNOSIS — Z95.2 HX OF AORTIC VALVE REPLACEMENT, MECHANICAL: Primary | ICD-10-CM

## 2021-11-29 DIAGNOSIS — Z79.01 LONG TERM (CURRENT) USE OF ANTICOAGULANTS: ICD-10-CM

## 2021-11-29 LAB — INR PPP: 4.3

## 2021-12-13 LAB — INR PPP: 3.5

## 2021-12-15 ENCOUNTER — ANTICOAGULATION VISIT (OUTPATIENT)
Dept: CARDIOLOGY | Facility: CLINIC | Age: 76
End: 2021-12-15

## 2021-12-15 DIAGNOSIS — Z95.2 HX OF AORTIC VALVE REPLACEMENT, MECHANICAL: Primary | ICD-10-CM

## 2021-12-15 DIAGNOSIS — Z79.01 LONG TERM (CURRENT) USE OF ANTICOAGULANTS: ICD-10-CM

## 2021-12-27 ENCOUNTER — ANTICOAGULATION VISIT (OUTPATIENT)
Dept: CARDIOLOGY | Facility: CLINIC | Age: 76
End: 2021-12-27

## 2021-12-27 DIAGNOSIS — Z95.2 HX OF AORTIC VALVE REPLACEMENT, MECHANICAL: Primary | ICD-10-CM

## 2021-12-27 DIAGNOSIS — Z79.01 LONG TERM (CURRENT) USE OF ANTICOAGULANTS: ICD-10-CM

## 2021-12-27 LAB — INR PPP: 4

## 2022-01-07 RX ORDER — ATENOLOL 100 MG/1
TABLET ORAL
Qty: 90 TABLET | Refills: 0 | Status: SHIPPED | OUTPATIENT
Start: 2022-01-07 | End: 2022-04-04

## 2022-01-07 NOTE — TELEPHONE ENCOUNTER
Rx Refill Note  Requested Prescriptions     Pending Prescriptions Disp Refills   • atenolol (TENORMIN) 100 MG tablet [Pharmacy Med Name: Atenolol 100 MG Oral Tablet] 90 tablet 0     Sig: Take 1/2 (one-half) tablet by mouth twice daily      Last office visit with prescribing clinician: 7/26/2021      Next office visit with prescribing clinician: 1/26/2022            Daisha Edmonds MA  01/07/22, 12:40 EST

## 2022-01-12 ENCOUNTER — ANTICOAGULATION VISIT (OUTPATIENT)
Dept: CARDIOLOGY | Facility: CLINIC | Age: 77
End: 2022-01-12

## 2022-01-12 DIAGNOSIS — Z95.2 HX OF AORTIC VALVE REPLACEMENT, MECHANICAL: Primary | ICD-10-CM

## 2022-01-12 DIAGNOSIS — Z79.01 LONG TERM (CURRENT) USE OF ANTICOAGULANTS: ICD-10-CM

## 2022-01-12 LAB — INR PPP: 2.8

## 2022-01-24 ENCOUNTER — ANTICOAGULATION VISIT (OUTPATIENT)
Dept: CARDIOLOGY | Facility: CLINIC | Age: 77
End: 2022-01-24

## 2022-01-24 DIAGNOSIS — Z79.01 LONG TERM (CURRENT) USE OF ANTICOAGULANTS: ICD-10-CM

## 2022-01-24 DIAGNOSIS — Z95.2 HX OF AORTIC VALVE REPLACEMENT, MECHANICAL: Primary | ICD-10-CM

## 2022-01-24 LAB — INR PPP: 2.8

## 2022-01-26 ENCOUNTER — OFFICE VISIT (OUTPATIENT)
Dept: CARDIOLOGY | Facility: CLINIC | Age: 77
End: 2022-01-26

## 2022-01-26 VITALS
BODY MASS INDEX: 23.04 KG/M2 | WEIGHT: 130 LBS | SYSTOLIC BLOOD PRESSURE: 149 MMHG | DIASTOLIC BLOOD PRESSURE: 90 MMHG | HEART RATE: 70 BPM | OXYGEN SATURATION: 99 % | HEIGHT: 63 IN

## 2022-01-26 DIAGNOSIS — I48.20 ATRIAL FIBRILLATION, CHRONIC: ICD-10-CM

## 2022-01-26 DIAGNOSIS — I10 ESSENTIAL HYPERTENSION: ICD-10-CM

## 2022-01-26 DIAGNOSIS — Z79.01 LONG TERM (CURRENT) USE OF ANTICOAGULANTS: Primary | ICD-10-CM

## 2022-01-26 DIAGNOSIS — Z95.2 S/P MVR (MITRAL VALVE REPLACEMENT): ICD-10-CM

## 2022-01-26 DIAGNOSIS — E78.5 DYSLIPIDEMIA: ICD-10-CM

## 2022-01-26 DIAGNOSIS — R06.02 SHORTNESS OF BREATH: ICD-10-CM

## 2022-01-26 PROCEDURE — 99214 OFFICE O/P EST MOD 30 MIN: CPT | Performed by: INTERNAL MEDICINE

## 2022-01-26 PROCEDURE — 93000 ELECTROCARDIOGRAM COMPLETE: CPT | Performed by: INTERNAL MEDICINE

## 2022-01-26 NOTE — PROGRESS NOTES
Encounter Date:01/26/2022  Last seen 7/26/2021      Patient ID: Melody Hernandez is a 76 y.o. female.    Chief Complaint:    Status post mitral valve replacement  Left bundle branch block  Chronic atrial fibrillation  Hypertension  Dyslipidemia  Anticoagulation management     History of Present Illness  Since I have last seen, the patient has been without any chest discomfort ,shortness of breath, palpitations, dizziness or syncope.  Denies having any headache ,abdominal pain ,nausea, vomiting , diarrhea constipation, loss of weight or loss of appetite.  Denies having any excessive bruising ,hematuria or blood in the stool.    Review of all systems negative except as indicated.    Reviewed ROS.  Assessment and Plan         ///////////////////  Impression  ==============   - Status post mitral valve replacement with [employer] mechanical heart valve and pulmonary vein isolation for atrial fibrillation May 2010 (patient had history of significant mitral valve prolapse and mitral regurgitation).     -   Persistent and chronic atrial fibrillation . Status post electrical cardioversion 05/24/2013 and 05/13/2016.   Patient is in atrial fibrillation today.     -moderate tricuspid regurgitation     - left bundle-branch block     Lexiscan Cardiolite test-normal 6/25/2020  Echocardiogram 6/25/2020 revealed biatrial enlargement structurally and functionally normal mitral prosthetic valve moderate tricuspid regurgitation.  Left ventricle ejection fraction is 60%.-  Exertional shortness of breath and fatigue  -stable and improved     Lexiscan Cardiolite test is negative for myocardial ischemia 10/25/2016.  Echocardiogram 10/25/2016 revealed normally functioning prosthetic mitral valve biatrial enlargement moderate mitral regurgitation and thickened aortic valve.     - Dyslipidemia and hypertension      - Smoker      - Family history of coronary artery disease  .   - allergy to Cipro and codeine     -history of intolerance to  amiodarone.  ============  Plan  ================  Status post mitral valve replacement  Patient is not having any angina pectoris or congestive heart failure.  EKG showed atrial fibrillation     Anticoagulation status reviewed.  Home monitoring.  INR data was reviewed.  Continue Coumadin.  Recent INR 2.8     Chronic atrial fibrillation-rate is well controlled.  EKG showed atrial fibrillation with controlled ventricular response.-1/26/2022     Dyslipidemia-continue atorvastatin     Hypertension-149/90    Medications were reviewed and updated.  Continue atenolol 50 mg twice a day and lisinopril 10 mg for blood pressure more than 150.  Patient is off Lanoxin.    Continue Coumadin and baby aspirin.  Continue Lipitor     Followup in the office in 6 months     Further plan will depend on patient's progress.  [[[[[[[[[[[[[[[[[[[[[[[[[               Diagnosis Plan   1. Long term (current) use of anticoagulants     2. S/P MVR (mitral valve replacement)     3. Essential hypertension     4. Dyslipidemia     5. Atrial fibrillation, chronic (HCC)     6. Shortness of breath     LAB RESULTS (LAST 7 DAYS)    CBC        BMP        CMP         BNP        TROPONIN        CoAg  Results from last 7 days   Lab Units 01/24/22  0000   INR  2.80       Creatinine Clearance  CrCl cannot be calculated (Patient's most recent lab result is older than the maximum 30 days allowed.).    ABG        Radiology  No radiology results for the last day                The following portions of the patient's history were reviewed and updated as appropriate: allergies, current medications, past family history, past medical history, past social history, past surgical history and problem list.    Review of Systems   Constitutional: Negative for malaise/fatigue.   Cardiovascular: Negative for chest pain, leg swelling, palpitations and syncope.   Respiratory: Negative for shortness of breath.    Skin: Negative for rash.   Gastrointestinal: Negative for nausea  and vomiting.   Neurological: Negative for dizziness, light-headedness and numbness.   All other systems reviewed and are negative.        Current Outpatient Medications:   •  aspirin (ASPIR-LOW) 81 MG EC tablet, ASPIR-LOW 81 MG TBEC, Disp: , Rfl:   •  atenolol (TENORMIN) 100 MG tablet, Take 1/2 (one-half) tablet by mouth twice daily, Disp: 90 tablet, Rfl: 0  •  atorvastatin (LIPITOR) 10 MG tablet, Take 10 mg by mouth Daily., Disp: , Rfl: 3  •  furosemide (LASIX) 20 MG tablet, Take 20 mg by mouth Daily., Disp: , Rfl:   •  lisinopril (PRINIVIL,ZESTRIL) 5 MG tablet, Take 5 mg by mouth Daily. Pt takes about 3x/wk per pt, Disp: , Rfl:   •  warfarin (COUMADIN) 5 MG tablet, WARFARIN SODIUM 5 MG TABS, Disp: , Rfl:     Allergies   Allergen Reactions   • Ciprofloxacin Unknown (See Comments)   • Codeine Unknown (See Comments)       Family History   Problem Relation Age of Onset   • Heart disease Mother    • Heart attack Mother    • Hypertension Mother        Past Surgical History:   Procedure Laterality Date   • APPENDECTOMY     • CARDIOVERSION     • CHOLECYSTECTOMY     • MITRAL VALVE REPLACEMENT  2010    Mechanical    • TONSILLECTOMY         Past Medical History:   Diagnosis Date   • Hyperlipidemia    • Mitral valve prolapse        Family History   Problem Relation Age of Onset   • Heart disease Mother    • Heart attack Mother    • Hypertension Mother        Social History     Socioeconomic History   • Marital status:    Tobacco Use   • Smoking status: Former Smoker     Types: Cigarettes     Quit date:      Years since quittin.0   • Smokeless tobacco: Never Used   Vaping Use   • Vaping Use: Never used   Substance and Sexual Activity   • Alcohol use: Yes     Comment: social   • Drug use: Never           ECG 12 Lead    Date/Time: 2022 4:25 PM  Performed by: Ahmet Lynn MD  Authorized by: Ahmet Lynn MD   Comparison: compared with previous ECG   Similar to previous ECG  Comparison to  "previous ECG: Atrial fibrillation with controlled ventricular response 62/min nonspecific ST-T wave changes normal axis normal intervals no ectopy no change from previous EKG.                Objective:       Physical Exam    /90 (BP Location: Left arm, Patient Position: Sitting, Cuff Size: Adult)   Pulse 70   Ht 160 cm (63\")   Wt 59 kg (130 lb)   SpO2 99%   BMI 23.03 kg/m²   The patient is alert, oriented and in no distress.    Vital signs as noted above.    Head and neck revealed no carotid bruits or jugular venous distension.  No thyromegaly or lymphadenopathy is present.    Lungs clear.  No wheezing.  Breath sounds are normal bilaterally.    Heart normal and crisp prosthetic heart sounds.  No murmur..  No pericardial rub is present.  No gallop is present.    Abdomen soft and nontender.  No organomegaly is present.    Extremities revealed good peripheral pulses without any pedal edema.    Skin warm and dry.    Musculoskeletal system is grossly normal.    CNS grossly normal.    Reviewed and unchanged from last visit.        "

## 2022-02-07 ENCOUNTER — ANTICOAGULATION VISIT (OUTPATIENT)
Dept: CARDIOLOGY | Facility: CLINIC | Age: 77
End: 2022-02-07

## 2022-02-07 DIAGNOSIS — Z95.2 HX OF AORTIC VALVE REPLACEMENT, MECHANICAL: Primary | ICD-10-CM

## 2022-02-07 DIAGNOSIS — Z79.01 LONG TERM (CURRENT) USE OF ANTICOAGULANTS: ICD-10-CM

## 2022-02-07 LAB — INR PPP: 4.3

## 2022-02-07 NOTE — PROGRESS NOTES
LMOM to hold warfarin today, please return call to let us know if there have been any changes. Spoke to patient, no changes other than no fresh vegetables due to not getting out in weather. Hold warfarin today and recheck 2 weeks.

## 2022-02-21 ENCOUNTER — ANTICOAGULATION VISIT (OUTPATIENT)
Dept: CARDIOLOGY | Facility: CLINIC | Age: 77
End: 2022-02-21

## 2022-02-21 DIAGNOSIS — Z79.01 LONG TERM (CURRENT) USE OF ANTICOAGULANTS: ICD-10-CM

## 2022-02-21 DIAGNOSIS — Z95.2 HX OF AORTIC VALVE REPLACEMENT, MECHANICAL: Primary | ICD-10-CM

## 2022-02-21 LAB — INR PPP: 3.9

## 2022-03-07 ENCOUNTER — ANTICOAGULATION VISIT (OUTPATIENT)
Dept: CARDIOLOGY | Facility: CLINIC | Age: 77
End: 2022-03-07

## 2022-03-07 DIAGNOSIS — Z95.2 HX OF AORTIC VALVE REPLACEMENT, MECHANICAL: Primary | ICD-10-CM

## 2022-03-07 DIAGNOSIS — Z79.01 LONG TERM (CURRENT) USE OF ANTICOAGULANTS: ICD-10-CM

## 2022-03-07 LAB — INR PPP: 4.6

## 2022-03-21 ENCOUNTER — ANTICOAGULATION VISIT (OUTPATIENT)
Dept: CARDIOLOGY | Facility: CLINIC | Age: 77
End: 2022-03-21

## 2022-03-21 DIAGNOSIS — Z95.2 HX OF AORTIC VALVE REPLACEMENT, MECHANICAL: Primary | ICD-10-CM

## 2022-03-21 DIAGNOSIS — Z79.01 LONG TERM (CURRENT) USE OF ANTICOAGULANTS: ICD-10-CM

## 2022-03-21 LAB — INR PPP: 2.3

## 2022-03-21 NOTE — PROGRESS NOTES
INR is close to therapeutic range, improved since last check. Continue present plan and recheck as contracted. Opal

## 2022-04-04 ENCOUNTER — ANTICOAGULATION VISIT (OUTPATIENT)
Dept: CARDIOLOGY | Facility: CLINIC | Age: 77
End: 2022-04-04

## 2022-04-04 DIAGNOSIS — Z95.2 HX OF AORTIC VALVE REPLACEMENT, MECHANICAL: Primary | ICD-10-CM

## 2022-04-04 DIAGNOSIS — Z79.01 LONG TERM (CURRENT) USE OF ANTICOAGULANTS: ICD-10-CM

## 2022-04-04 LAB — INR PPP: 2.9

## 2022-04-04 RX ORDER — ATENOLOL 100 MG/1
TABLET ORAL
Qty: 90 TABLET | Refills: 0 | Status: SHIPPED | OUTPATIENT
Start: 2022-04-04 | End: 2022-06-30

## 2022-04-04 NOTE — TELEPHONE ENCOUNTER
Rx Refill Note  Requested Prescriptions     Pending Prescriptions Disp Refills   • atenolol (TENORMIN) 100 MG tablet [Pharmacy Med Name: Atenolol 100 MG Oral Tablet] 90 tablet 0     Sig: Take 1/2 (one-half) tablet by mouth twice daily      Last office visit with prescribing clinician: 1/26/2022      Next office visit with prescribing clinician: 8/8/2022            Lynda Dillard MA  04/04/22, 19:04 EDT

## 2022-04-18 ENCOUNTER — ANTICOAGULATION VISIT (OUTPATIENT)
Dept: CARDIOLOGY | Facility: CLINIC | Age: 77
End: 2022-04-18

## 2022-04-18 DIAGNOSIS — Z95.2 HX OF AORTIC VALVE REPLACEMENT, MECHANICAL: Primary | ICD-10-CM

## 2022-04-18 DIAGNOSIS — Z79.01 LONG TERM (CURRENT) USE OF ANTICOAGULANTS: ICD-10-CM

## 2022-04-18 LAB — INR PPP: 5

## 2022-04-25 ENCOUNTER — TELEPHONE (OUTPATIENT)
Dept: CARDIOLOGY | Facility: CLINIC | Age: 77
End: 2022-04-25

## 2022-04-25 ENCOUNTER — ANTICOAGULATION VISIT (OUTPATIENT)
Dept: CARDIOLOGY | Facility: CLINIC | Age: 77
End: 2022-04-25

## 2022-04-25 DIAGNOSIS — Z79.01 LONG TERM (CURRENT) USE OF ANTICOAGULANTS: ICD-10-CM

## 2022-04-25 DIAGNOSIS — Z95.2 HX OF AORTIC VALVE REPLACEMENT, MECHANICAL: Primary | ICD-10-CM

## 2022-04-25 LAB — INR PPP: 2.1

## 2022-05-09 ENCOUNTER — TELEPHONE (OUTPATIENT)
Dept: CARDIOLOGY | Facility: CLINIC | Age: 77
End: 2022-05-09

## 2022-05-09 ENCOUNTER — ANTICOAGULATION VISIT (OUTPATIENT)
Dept: CARDIOLOGY | Facility: CLINIC | Age: 77
End: 2022-05-09

## 2022-05-09 DIAGNOSIS — Z79.01 LONG TERM (CURRENT) USE OF ANTICOAGULANTS: ICD-10-CM

## 2022-05-09 DIAGNOSIS — Z95.2 HX OF AORTIC VALVE REPLACEMENT, MECHANICAL: Primary | ICD-10-CM

## 2022-05-09 LAB — INR PPP: 3.6

## 2022-05-23 ENCOUNTER — ANTICOAGULATION VISIT (OUTPATIENT)
Dept: CARDIOLOGY | Facility: CLINIC | Age: 77
End: 2022-05-23

## 2022-05-23 DIAGNOSIS — Z95.2 HX OF AORTIC VALVE REPLACEMENT, MECHANICAL: Primary | ICD-10-CM

## 2022-05-23 DIAGNOSIS — Z79.01 LONG TERM (CURRENT) USE OF ANTICOAGULANTS: ICD-10-CM

## 2022-05-23 LAB — INR PPP: 3.5

## 2022-06-06 ENCOUNTER — ANTICOAGULATION VISIT (OUTPATIENT)
Dept: CARDIOLOGY | Facility: CLINIC | Age: 77
End: 2022-06-06

## 2022-06-06 DIAGNOSIS — Z95.2 HX OF AORTIC VALVE REPLACEMENT, MECHANICAL: Primary | ICD-10-CM

## 2022-06-06 DIAGNOSIS — Z79.01 LONG TERM (CURRENT) USE OF ANTICOAGULANTS: ICD-10-CM

## 2022-06-06 LAB — INR PPP: 3.1

## 2022-06-20 ENCOUNTER — ANTICOAGULATION VISIT (OUTPATIENT)
Dept: CARDIOLOGY | Facility: CLINIC | Age: 77
End: 2022-06-20

## 2022-06-20 ENCOUNTER — TELEPHONE (OUTPATIENT)
Dept: CARDIOLOGY | Facility: CLINIC | Age: 77
End: 2022-06-20

## 2022-06-20 DIAGNOSIS — Z79.01 LONG TERM (CURRENT) USE OF ANTICOAGULANTS: ICD-10-CM

## 2022-06-20 DIAGNOSIS — Z95.2 HX OF AORTIC VALVE REPLACEMENT, MECHANICAL: Primary | ICD-10-CM

## 2022-06-20 LAB — INR PPP: 5.5

## 2022-06-20 NOTE — PROGRESS NOTES
Spoke to patient, she has not had an appetite with the hot weather. Hold warfarin x 2 days and resume schedule. Check INR 2 weeks.

## 2022-06-30 RX ORDER — ATENOLOL 100 MG/1
TABLET ORAL
Qty: 90 TABLET | Refills: 3 | Status: SHIPPED | OUTPATIENT
Start: 2022-06-30 | End: 2022-10-14

## 2022-06-30 NOTE — TELEPHONE ENCOUNTER
Rx Refill Note  Requested Prescriptions     Pending Prescriptions Disp Refills   • atenolol (TENORMIN) 100 MG tablet [Pharmacy Med Name: Atenolol 100 MG Oral Tablet] 90 tablet 3     Sig: Take 1/2 (one-half) tablet by mouth twice daily      Last office visit with prescribing clinician: 1/26/2022      Next office visit with prescribing clinician: 8/8/2022            Tran Epps MA  06/30/22, 16:14 EDT

## 2022-07-05 ENCOUNTER — ANTICOAGULATION VISIT (OUTPATIENT)
Dept: CARDIOLOGY | Facility: CLINIC | Age: 77
End: 2022-07-05

## 2022-07-05 DIAGNOSIS — Z79.01 LONG TERM (CURRENT) USE OF ANTICOAGULANTS: ICD-10-CM

## 2022-07-05 DIAGNOSIS — Z95.2 HX OF AORTIC VALVE REPLACEMENT, MECHANICAL: Primary | ICD-10-CM

## 2022-07-05 LAB — INR PPP: 5.8

## 2022-07-11 ENCOUNTER — TELEPHONE (OUTPATIENT)
Dept: CARDIOLOGY | Facility: CLINIC | Age: 77
End: 2022-07-11

## 2022-07-11 ENCOUNTER — ANTICOAGULATION VISIT (OUTPATIENT)
Dept: CARDIOLOGY | Facility: CLINIC | Age: 77
End: 2022-07-11

## 2022-07-11 DIAGNOSIS — Z95.2 HX OF AORTIC VALVE REPLACEMENT, MECHANICAL: Primary | ICD-10-CM

## 2022-07-11 DIAGNOSIS — Z79.01 LONG TERM (CURRENT) USE OF ANTICOAGULANTS: ICD-10-CM

## 2022-07-11 LAB — INR PPP: 1.8

## 2022-07-11 NOTE — PROGRESS NOTES
Spoke to patient, CPM and recheck 2 weeks. Patient held warfarin 3 days last week and has a better appetite with reduced temperature.

## 2022-07-25 ENCOUNTER — ANTICOAGULATION VISIT (OUTPATIENT)
Dept: CARDIOLOGY | Facility: CLINIC | Age: 77
End: 2022-07-25

## 2022-07-25 DIAGNOSIS — Z95.2 HX OF AORTIC VALVE REPLACEMENT, MECHANICAL: Primary | ICD-10-CM

## 2022-07-25 DIAGNOSIS — Z79.01 LONG TERM (CURRENT) USE OF ANTICOAGULANTS: ICD-10-CM

## 2022-07-25 LAB — INR PPP: 3.5

## 2022-08-08 ENCOUNTER — OFFICE VISIT (OUTPATIENT)
Dept: CARDIOLOGY | Facility: CLINIC | Age: 77
End: 2022-08-08

## 2022-08-08 ENCOUNTER — ANTICOAGULATION VISIT (OUTPATIENT)
Dept: CARDIOLOGY | Facility: CLINIC | Age: 77
End: 2022-08-08

## 2022-08-08 VITALS
OXYGEN SATURATION: 90 % | WEIGHT: 127.2 LBS | HEART RATE: 70 BPM | DIASTOLIC BLOOD PRESSURE: 114 MMHG | HEIGHT: 63 IN | BODY MASS INDEX: 22.54 KG/M2 | SYSTOLIC BLOOD PRESSURE: 191 MMHG

## 2022-08-08 DIAGNOSIS — Z95.2 S/P MVR (MITRAL VALVE REPLACEMENT): ICD-10-CM

## 2022-08-08 DIAGNOSIS — Z95.2 HISTORY OF MITRAL VALVE REPLACEMENT: ICD-10-CM

## 2022-08-08 DIAGNOSIS — E78.5 DYSLIPIDEMIA: ICD-10-CM

## 2022-08-08 DIAGNOSIS — R06.02 SHORTNESS OF BREATH: Primary | ICD-10-CM

## 2022-08-08 DIAGNOSIS — I10 ESSENTIAL HYPERTENSION: ICD-10-CM

## 2022-08-08 DIAGNOSIS — Z79.01 LONG TERM (CURRENT) USE OF ANTICOAGULANTS: ICD-10-CM

## 2022-08-08 DIAGNOSIS — Z95.2 HX OF AORTIC VALVE REPLACEMENT, MECHANICAL: Primary | ICD-10-CM

## 2022-08-08 DIAGNOSIS — I48.20 ATRIAL FIBRILLATION, CHRONIC: ICD-10-CM

## 2022-08-08 LAB — INR PPP: 5.8

## 2022-08-08 PROCEDURE — 93000 ELECTROCARDIOGRAM COMPLETE: CPT | Performed by: INTERNAL MEDICINE

## 2022-08-08 PROCEDURE — 99214 OFFICE O/P EST MOD 30 MIN: CPT | Performed by: INTERNAL MEDICINE

## 2022-08-08 RX ORDER — DOXYCYCLINE HYCLATE 100 MG/1
100 CAPSULE ORAL 2 TIMES DAILY
COMMUNITY
Start: 2022-08-01 | End: 2022-10-14

## 2022-08-08 NOTE — PROGRESS NOTES
Encounter Date:08/08/2022  Last seen 1/26/2022      Patient ID: Melody Hernandez is a 76 y.o. female.    Chief Complaint:    Status post mitral valve replacement  Left bundle branch block  Chronic atrial fibrillation  Hypertension  Dyslipidemia  Anticoagulation management     History of Present Illness  Patient is having shortness of breath with activities such as going to the mailbox etc.    Since I have last seen, the patient has been without any chest discomfort , palpitations, dizziness or syncope.  Denies having any headache ,abdominal pain ,nausea, vomiting , diarrhea constipation, loss of weight or loss of appetite.  Denies having any excessive bruising ,hematuria or blood in the stool.    Review of all systems negative except as indicated.    Reviewed ROS.  Assessment and Plan         ///////////////////  Impression  ==============   - Status post mitral valve replacement with [employer] mechanical heart valve and pulmonary vein isolation for atrial fibrillation May 2010 (patient had history of significant mitral valve prolapse and mitral regurgitation).     -   Persistent and chronic atrial fibrillation . Status post electrical cardioversion 05/24/2013 and 05/13/2016.   Patient is in atrial fibrillation today.     -moderate tricuspid regurgitation     - left bundle-branch block     Lexiscan Cardiolite test-normal 6/25/2020  Echocardiogram 6/25/2020 revealed biatrial enlargement structurally and functionally normal mitral prosthetic valve moderate tricuspid regurgitation.  Left ventricle ejection fraction is 60%.-  Exertional shortness of breath and fatigue  -stable and improved     Lexiscan Cardiolite test is negative for myocardial ischemia 10/25/2016.  Echocardiogram 10/25/2016 revealed normally functioning prosthetic mitral valve biatrial enlargement moderate mitral regurgitation and thickened aortic valve.     - Dyslipidemia and hypertension      - Smoker      - Family history of coronary artery  disease  .   - allergy to Cipro and codeine     -history of intolerance to amiodarone.  ============  Plan  ================  Shortness of breath with exertion with activities such as going to the mailbox etc.  Echocardiogram  Stress Cardiolite test    Status post mitral valve replacement  EKG showed atrial fibrillation with controlled ventricular response     Anticoagulation status reviewed.  Home monitoring.  INR data was reviewed.  Continue Coumadin.  INR today 5.58  Adjust Coumadin dosage.     Chronic atrial fibrillation-rate is well controlled.  EKG showed atrial fibrillation with controlled ventricular response.  8/8/2022    Dyslipidemia-continue atorvastatin     Hypertension- 190/114  Blood pressure apparently running normal at home.  Shortness of breath could be due to elevated blood pressure.  May need to increase in dosage of lisinopril     Medications were reviewed and updated.  Continue atenolol 50 mg twice a day and lisinopril 10 mg for blood pressure more than 150.  Patient is off Lanoxin.    Continue Coumadin and baby aspirin.  Continue Lipitor     Followup in the office in on the same day.     Further plan will depend on patient's progress.  [[[[[[[[[[[[[[[[[[[[[[[[[                      Diagnosis Plan   1. Shortness of breath  Adult Transthoracic Echo Complete W/ Cont if Necessary Per Protocol    Stress Test With Myocardial Perfusion One Day   2. Long term (current) use of anticoagulants  Adult Transthoracic Echo Complete W/ Cont if Necessary Per Protocol    Stress Test With Myocardial Perfusion One Day   3. S/P MVR (mitral valve replacement)  Adult Transthoracic Echo Complete W/ Cont if Necessary Per Protocol    Stress Test With Myocardial Perfusion One Day   4. Essential hypertension  Adult Transthoracic Echo Complete W/ Cont if Necessary Per Protocol    Stress Test With Myocardial Perfusion One Day   5. Dyslipidemia  Adult Transthoracic Echo Complete W/ Cont if Necessary Per Protocol    Stress  Test With Myocardial Perfusion One Day   6. Atrial fibrillation, chronic (HCC)  Adult Transthoracic Echo Complete W/ Cont if Necessary Per Protocol    Stress Test With Myocardial Perfusion One Day   7. History of mitral valve replacement  Adult Transthoracic Echo Complete W/ Cont if Necessary Per Protocol    Stress Test With Myocardial Perfusion One Day   LAB RESULTS (LAST 7 DAYS)    CBC        BMP        CMP         BNP        TROPONIN        CoAg  Results from last 7 days   Lab Units 08/08/22  0000   INR  5.80       Creatinine Clearance  CrCl cannot be calculated (Patient's most recent lab result is older than the maximum 30 days allowed.).    ABG        Radiology  No radiology results for the last day                The following portions of the patient's history were reviewed and updated as appropriate: allergies, current medications, past family history, past medical history, past social history, past surgical history and problem list.    Review of Systems   Constitutional: Negative for fever and malaise/fatigue.   Cardiovascular: Positive for palpitations. Negative for chest pain and dyspnea on exertion.   Respiratory: Positive for shortness of breath. Negative for cough.    Skin: Negative for rash.   Gastrointestinal: Negative for abdominal pain, nausea and vomiting.   Neurological: Negative for focal weakness and headaches.   All other systems reviewed and are negative.        Current Outpatient Medications:   •  aspirin (aspirin) 81 MG EC tablet, ASPIR-LOW 81 MG TBEC, Disp: , Rfl:   •  atenolol (TENORMIN) 100 MG tablet, Take 1/2 (one-half) tablet by mouth twice daily, Disp: 90 tablet, Rfl: 3  •  atorvastatin (LIPITOR) 10 MG tablet, Take 10 mg by mouth Daily., Disp: , Rfl: 3  •  doxycycline (VIBRAMYCIN) 100 MG capsule, Take 100 mg by mouth 2 (Two) Times a Day., Disp: , Rfl:   •  furosemide (LASIX) 20 MG tablet, Take 20 mg by mouth Daily., Disp: , Rfl:   •  lisinopril (PRINIVIL,ZESTRIL) 5 MG tablet, Take 5 mg  "by mouth Daily. Pt takes about 3x/wk per pt, Disp: , Rfl:   •  warfarin (COUMADIN) 5 MG tablet, WARFARIN SODIUM 5 MG TABS, Disp: , Rfl:     Allergies   Allergen Reactions   • Ciprofloxacin Unknown (See Comments)   • Codeine Unknown (See Comments)       Family History   Problem Relation Age of Onset   • Heart disease Mother    • Heart attack Mother    • Hypertension Mother        Past Surgical History:   Procedure Laterality Date   • APPENDECTOMY     • CARDIOVERSION     • CHOLECYSTECTOMY     • MITRAL VALVE REPLACEMENT  2010    Mechanical    • TONSILLECTOMY         Past Medical History:   Diagnosis Date   • Hyperlipidemia    • Mitral valve prolapse        Family History   Problem Relation Age of Onset   • Heart disease Mother    • Heart attack Mother    • Hypertension Mother        Social History     Socioeconomic History   • Marital status:    Tobacco Use   • Smoking status: Former Smoker     Types: Cigarettes     Quit date:      Years since quittin.6   • Smokeless tobacco: Never Used   Vaping Use   • Vaping Use: Never used   Substance and Sexual Activity   • Alcohol use: Yes     Comment: social   • Drug use: Never   • Sexual activity: Defer           ECG 12 Lead    Date/Time: 2022 11:50 AM  Performed by: Ahmet Lynn MD  Authorized by: Ahmet Lynn MD   Comparison: compared with previous ECG   Similar to previous ECG  Comparison to previous ECG: Atrial fibrillation with controlled ventricular response nonspecific ST-T wave changes 65/min normal axis normal intervals no ectopy no change from previous EKG.                Objective:       Physical Exam    BP (!) 191/114 (BP Location: Right arm, Patient Position: Sitting, Cuff Size: Adult)   Pulse 70   Ht 160 cm (63\")   Wt 57.7 kg (127 lb 3.2 oz)   SpO2 90%   BMI 22.53 kg/m²   The patient is alert, oriented and in no distress.    Vital signs as noted above.    Head and neck revealed no carotid bruits or jugular venous distension. "  No thyromegaly or lymphadenopathy is present.    Lungs clear.  No wheezing.  Breath sounds are normal bilaterally.    Heart normal and crisp heart sounds.  No murmur..  No pericardial rub is present.  No gallop is present.    Abdomen soft and nontender.  No organomegaly is present.    Extremities revealed good peripheral pulses without any pedal edema.    Skin warm and dry.    Musculoskeletal system is grossly normal.    CNS grossly normal.    Reviewed and updated.

## 2022-08-08 NOTE — PROGRESS NOTES
Pt recently started Doxycycline for a skin infection. Has 3 days left of an antibiotic. Hold Warfarin for 3 days.  Will recheck INR in a week. Will let us know if she is required to take another round of antibiotics. Opal

## 2022-08-15 ENCOUNTER — ANTICOAGULATION VISIT (OUTPATIENT)
Dept: CARDIOLOGY | Facility: CLINIC | Age: 77
End: 2022-08-15

## 2022-08-15 DIAGNOSIS — Z95.2 HX OF AORTIC VALVE REPLACEMENT, MECHANICAL: Primary | ICD-10-CM

## 2022-08-15 DIAGNOSIS — Z79.01 LONG TERM (CURRENT) USE OF ANTICOAGULANTS: ICD-10-CM

## 2022-08-15 LAB — INR PPP: 2

## 2022-08-17 NOTE — PROGRESS NOTES
Encounter Date:08/23/2022    Last seen-8/8/2022      Patient ID: Melody Hernandez is a 76 y.o. female.    Chief Complaint:    Status post mitral valve replacement  Left bundle branch block  Chronic atrial fibrillation  Hypertension  Dyslipidemia  Anticoagulation management     History of Present Illness  Patient recently was seen with following history.  Reviewed and updated.    Patient is having shortness of breath with activities such as going to the mailbox etc.     Since I have last seen, the patient has been without any chest discomfort , palpitations, dizziness or syncope.  Denies having any headache ,abdominal pain ,nausea, vomiting , diarrhea constipation, loss of weight or loss of appetite.  Denies having any excessive bruising ,hematuria or blood in the stool.     Review of all systems negative except as indicated.     Reviewed ROS.  Assessment and Plan         ///////////////////  Impression  ==============   - Status post mitral valve replacement with [employer] mechanical heart valve and pulmonary vein isolation for atrial fibrillation May 2010 (patient had history of significant mitral valve prolapse and mitral regurgitation).     -   Persistent and chronic atrial fibrillation . Status post electrical cardioversion 05/24/2013 and 05/13/2016.   Patient is in atrial fibrillation today.     -moderate tricuspid regurgitation     - left bundle-branch block    Echocardiogram-8/23/2022 revealed mild tricuspid regurgitation normally functioning mitral prosthetic valve and normal left ventricle function.  Left atrial enlargement.  Lexiscan test-normal- 8/23/2022     Lexiscan Cardiolite test-normal 6/25/2020  Echocardiogram 6/25/2020 revealed biatrial enlargement structurally and functionally normal mitral prosthetic valve moderate tricuspid regurgitation.  Left ventricle ejection fraction is 60%.-  Exertional shortness of breath and fatigue  -stable and improved     Lexiscan Cardiolite test is negative for  myocardial ischemia 10/25/2016.  Echocardiogram 10/25/2016 revealed normally functioning prosthetic mitral valve biatrial enlargement moderate mitral regurgitation and thickened aortic valve.     - Dyslipidemia and hypertension      - Smoker      - Family history of coronary artery disease  .   - allergy to Cipro and codeine     -history of intolerance to amiodarone.  ============  Plan  ================  Shortness of breath with exertion with activities such as going to the mailbox etc.  Echocardiogram-as above  Stress Cardiolite test-as aboveStatus post mitral valve replacement  EKG showed atrial fibrillation with controlled ventricular response     Anticoagulation status reviewed.  Home monitoring.  INR data was reviewed.  Continue Coumadin.  INR-2.0.     Chronic atrial fibrillation-rate is well controlled.  EKG showed atrial fibrillation with controlled ventricular response.  8/8/2022     Dyslipidemia-continue atorvastatin     Hypertension-  140/80    Medications were reviewed and updated.  Continue atenolol 50 mg twice a day and lisinopril 10 mg for blood pressure more than 150.  Patient is off Lanoxin.    Continue Coumadin and baby aspirin.  Continue Lipitor     Followup in the office in 6 months.     Further plan will depend on patient's progress.  [[[[[[[[[[[[[[[[[[[[[[[[[                      Diagnosis Plan   1. Essential hypertension     2. Shortness of breath     3. Dyslipidemia     4. Long term (current) use of anticoagulants     5. S/P MVR (mitral valve replacement)     6. Atrial fibrillation, chronic (HCC)     7. History of mitral valve replacement     LAB RESULTS (LAST 7 DAYS)    CBC        BMP        CMP         BNP        TROPONIN        CoAg  Results from last 7 days   Lab Units 08/15/22  0000   INR  2.00       Creatinine Clearance  CrCl cannot be calculated (Patient's most recent lab result is older than the maximum 30 days allowed.).    ABG        Radiology  No radiology results for the last  day                The following portions of the patient's history were reviewed and updated as appropriate: allergies, current medications, past family history, past medical history, past social history, past surgical history and problem list.    Review of Systems   Constitutional: Negative for malaise/fatigue.   Cardiovascular: Negative for chest pain, leg swelling, palpitations and syncope.   Respiratory: Negative for shortness of breath.    Skin: Negative for rash.   Gastrointestinal: Negative for nausea and vomiting.   Neurological: Negative for dizziness, light-headedness and numbness.   All other systems reviewed and are negative.        Current Outpatient Medications:   •  aspirin (aspirin) 81 MG EC tablet, ASPIR-LOW 81 MG TBEC, Disp: , Rfl:   •  atenolol (TENORMIN) 100 MG tablet, Take 1/2 (one-half) tablet by mouth twice daily, Disp: 90 tablet, Rfl: 3  •  atorvastatin (LIPITOR) 10 MG tablet, Take 10 mg by mouth Daily., Disp: , Rfl: 3  •  doxycycline (VIBRAMYCIN) 100 MG capsule, Take 100 mg by mouth 2 (Two) Times a Day., Disp: , Rfl:   •  furosemide (LASIX) 20 MG tablet, Take 20 mg by mouth Daily., Disp: , Rfl:   •  lisinopril (PRINIVIL,ZESTRIL) 5 MG tablet, Take 5 mg by mouth Daily. Pt takes about 3x/wk per pt, Disp: , Rfl:   •  warfarin (COUMADIN) 5 MG tablet, WARFARIN SODIUM 5 MG TABS, Disp: , Rfl:     Allergies   Allergen Reactions   • Ciprofloxacin Unknown (See Comments)   • Codeine Unknown (See Comments)       Family History   Problem Relation Age of Onset   • Heart disease Mother    • Heart attack Mother    • Hypertension Mother        Past Surgical History:   Procedure Laterality Date   • APPENDECTOMY     • CARDIOVERSION     • CHOLECYSTECTOMY     • MITRAL VALVE REPLACEMENT  05/20/2010    Mechanical    • TONSILLECTOMY         Past Medical History:   Diagnosis Date   • Hyperlipidemia    • Mitral valve prolapse        Family History   Problem Relation Age of Onset   • Heart disease Mother    • Heart  attack Mother    • Hypertension Mother        Social History     Socioeconomic History   • Marital status:    Tobacco Use   • Smoking status: Former Smoker     Types: Cigarettes     Quit date:      Years since quittin.6   • Smokeless tobacco: Never Used   Vaping Use   • Vaping Use: Never used   Substance and Sexual Activity   • Alcohol use: Yes     Comment: social   • Drug use: Never   • Sexual activity: Defer         Procedures      Objective:       Physical Exam    There were no vitals taken for this visit.  The patient is alert, oriented and in no distress.    Vital signs as noted above.    Head and neck revealed no carotid bruits or jugular venous distension.  No thyromegaly or lymphadenopathy is present.    Lungs clear.  No wheezing.  Breath sounds are normal bilaterally.    Heart normal and crisp prosthetic heart sounds.  No murmur..  No pericardial rub is present.  No gallop is present.    Abdomen soft and nontender.  No organomegaly is present.    Extremities revealed good peripheral pulses without any pedal edema.    Skin warm and dry.    Musculoskeletal system is grossly normal.    CNS grossly normal.    Reviewed and updated.

## 2022-08-22 ENCOUNTER — ANTICOAGULATION VISIT (OUTPATIENT)
Dept: CARDIOLOGY | Facility: CLINIC | Age: 77
End: 2022-08-22

## 2022-08-22 DIAGNOSIS — Z95.2 HX OF AORTIC VALVE REPLACEMENT, MECHANICAL: Primary | ICD-10-CM

## 2022-08-22 DIAGNOSIS — Z79.01 LONG TERM (CURRENT) USE OF ANTICOAGULANTS: ICD-10-CM

## 2022-08-22 LAB — INR PPP: 2.2

## 2022-08-23 ENCOUNTER — HOSPITAL ENCOUNTER (OUTPATIENT)
Dept: CARDIOLOGY | Facility: HOSPITAL | Age: 77
Discharge: HOME | End: 2022-08-23

## 2022-08-23 ENCOUNTER — OFFICE VISIT (OUTPATIENT)
Dept: CARDIOLOGY | Facility: CLINIC | Age: 77
End: 2022-08-23

## 2022-08-23 VITALS
HEIGHT: 63 IN | WEIGHT: 127 LBS | SYSTOLIC BLOOD PRESSURE: 153 MMHG | BODY MASS INDEX: 22.5 KG/M2 | DIASTOLIC BLOOD PRESSURE: 93 MMHG

## 2022-08-23 VITALS
WEIGHT: 127 LBS | DIASTOLIC BLOOD PRESSURE: 80 MMHG | SYSTOLIC BLOOD PRESSURE: 140 MMHG | BODY MASS INDEX: 22.5 KG/M2 | HEIGHT: 63 IN | HEART RATE: 67 BPM

## 2022-08-23 DIAGNOSIS — R06.02 SHORTNESS OF BREATH: ICD-10-CM

## 2022-08-23 DIAGNOSIS — Z79.01 LONG TERM (CURRENT) USE OF ANTICOAGULANTS: ICD-10-CM

## 2022-08-23 DIAGNOSIS — Z95.2 HISTORY OF MITRAL VALVE REPLACEMENT: ICD-10-CM

## 2022-08-23 DIAGNOSIS — Z95.2 S/P MVR (MITRAL VALVE REPLACEMENT): ICD-10-CM

## 2022-08-23 DIAGNOSIS — I10 ESSENTIAL HYPERTENSION: ICD-10-CM

## 2022-08-23 DIAGNOSIS — I48.20 ATRIAL FIBRILLATION, CHRONIC: ICD-10-CM

## 2022-08-23 DIAGNOSIS — I10 ESSENTIAL HYPERTENSION: Primary | ICD-10-CM

## 2022-08-23 DIAGNOSIS — E78.5 DYSLIPIDEMIA: ICD-10-CM

## 2022-08-23 LAB
BH CV ECHO MEAS - ACS: 1.91 CM
BH CV ECHO MEAS - AO MAX PG: 3.3 MMHG
BH CV ECHO MEAS - AO MEAN PG: 1.92 MMHG
BH CV ECHO MEAS - AO ROOT DIAM: 3.3 CM
BH CV ECHO MEAS - AO V2 MAX: 90.4 CM/SEC
BH CV ECHO MEAS - AO V2 VTI: 19 CM
BH CV ECHO MEAS - AVA(I,D): 1.83 CM2
BH CV ECHO MEAS - EDV(CUBED): 78.6 ML
BH CV ECHO MEAS - EDV(MOD-SP4): 45.1 ML
BH CV ECHO MEAS - EF(MOD-BP): 46 %
BH CV ECHO MEAS - EF(MOD-SP4): 45.6 %
BH CV ECHO MEAS - ESV(CUBED): 19.6 ML
BH CV ECHO MEAS - ESV(MOD-SP4): 24.5 ML
BH CV ECHO MEAS - FS: 37.1 %
BH CV ECHO MEAS - IVS/LVPW: 0.73 CM
BH CV ECHO MEAS - IVSD: 0.99 CM
BH CV ECHO MEAS - LA A2CS (ATRIAL LENGTH): 5.6 CM
BH CV ECHO MEAS - LV DIASTOLIC VOL/BSA (35-75): 28.3 CM2
BH CV ECHO MEAS - LV MASS(C)D: 175.9 GRAMS
BH CV ECHO MEAS - LV MAX PG: 1.24 MMHG
BH CV ECHO MEAS - LV MEAN PG: 0.67 MMHG
BH CV ECHO MEAS - LV SYSTOLIC VOL/BSA (12-30): 15.4 CM2
BH CV ECHO MEAS - LV V1 MAX: 55.6 CM/SEC
BH CV ECHO MEAS - LV V1 VTI: 11.3 CM
BH CV ECHO MEAS - LVIDD: 4.3 CM
BH CV ECHO MEAS - LVIDS: 2.7 CM
BH CV ECHO MEAS - LVOT AREA: 3.1 CM2
BH CV ECHO MEAS - LVOT DIAM: 1.98 CM
BH CV ECHO MEAS - LVPWD: 1.34 CM
BH CV ECHO MEAS - MV MAX PG: 5.7 MMHG
BH CV ECHO MEAS - MV MEAN PG: 2.38 MMHG
BH CV ECHO MEAS - MV V2 VTI: 26.2 CM
BH CV ECHO MEAS - MVA(VTI): 1.33 CM2
BH CV ECHO MEAS - PA ACC TIME: 0.02 SEC
BH CV ECHO MEAS - PA PR(ACCEL): 68.1 MMHG
BH CV ECHO MEAS - PA V2 MAX: 74.9 CM/SEC
BH CV ECHO MEAS - RAP SYSTOLE: 3 MMHG
BH CV ECHO MEAS - RV MAX PG: 0.8 MMHG
BH CV ECHO MEAS - RV V1 MAX: 44.6 CM/SEC
BH CV ECHO MEAS - RV V1 VTI: 7.3 CM
BH CV ECHO MEAS - RVDD: 2.6 CM
BH CV ECHO MEAS - RVSP: 29 MMHG
BH CV ECHO MEAS - SI(MOD-SP4): 12.9 ML/M2
BH CV ECHO MEAS - SV(LVOT): 34.8 ML
BH CV ECHO MEAS - SV(MOD-SP4): 20.6 ML
BH CV ECHO MEAS - TR MAX PG: 26 MMHG
BH CV ECHO MEAS - TR MAX VEL: 253.9 CM/SEC
BH CV REST NUCLEAR ISOTOPE DOSE: 11 MCI
BH CV STRESS BP STAGE 1: NORMAL
BH CV STRESS COMMENTS STAGE 1: NORMAL
BH CV STRESS DOSE REGADENOSON STAGE 1: 0.4
BH CV STRESS DURATION MIN STAGE 1: 0
BH CV STRESS DURATION SEC STAGE 1: 10
BH CV STRESS HR STAGE 1: 70
BH CV STRESS NUCLEAR ISOTOPE DOSE: 32.6 MCI
BH CV STRESS PROTOCOL 1: NORMAL
BH CV STRESS RECOVERY BP: NORMAL MMHG
BH CV STRESS RECOVERY HR: 77 BPM
BH CV STRESS STAGE 1: 1
LV EF 2D ECHO EST: 60 %
MAXIMAL PREDICTED HEART RATE: 145 BPM
MAXIMAL PREDICTED HEART RATE: 145 BPM
STRESS BASELINE BP: NORMAL MMHG
STRESS BASELINE HR: 70 BPM
STRESS TARGET HR: 123 BPM
STRESS TARGET HR: 123 BPM

## 2022-08-23 PROCEDURE — 78452 HT MUSCLE IMAGE SPECT MULT: CPT

## 2022-08-23 PROCEDURE — 25010000002 REGADENOSON 0.4 MG/5ML SOLUTION: Performed by: INTERNAL MEDICINE

## 2022-08-23 PROCEDURE — 99214 OFFICE O/P EST MOD 30 MIN: CPT | Performed by: INTERNAL MEDICINE

## 2022-08-23 PROCEDURE — 93016 CV STRESS TEST SUPVJ ONLY: CPT | Performed by: INTERNAL MEDICINE

## 2022-08-23 PROCEDURE — A9500 TC99M SESTAMIBI: HCPCS | Performed by: INTERNAL MEDICINE

## 2022-08-23 PROCEDURE — 93306 TTE W/DOPPLER COMPLETE: CPT | Performed by: INTERNAL MEDICINE

## 2022-08-23 PROCEDURE — 93306 TTE W/DOPPLER COMPLETE: CPT

## 2022-08-23 PROCEDURE — 0 TECHNETIUM SESTAMIBI: Performed by: INTERNAL MEDICINE

## 2022-08-23 PROCEDURE — 93017 CV STRESS TEST TRACING ONLY: CPT

## 2022-08-23 PROCEDURE — 78452 HT MUSCLE IMAGE SPECT MULT: CPT | Performed by: INTERNAL MEDICINE

## 2022-08-23 PROCEDURE — 93018 CV STRESS TEST I&R ONLY: CPT | Performed by: INTERNAL MEDICINE

## 2022-08-23 RX ADMIN — TECHNETIUM TC 99M SESTAMIBI 1 DOSE: 1 INJECTION INTRAVENOUS at 10:00

## 2022-08-23 RX ADMIN — REGADENOSON 0.4 MG: 0.08 INJECTION, SOLUTION INTRAVENOUS at 10:00

## 2022-08-23 RX ADMIN — TECHNETIUM TC 99M SESTAMIBI 1 DOSE: 1 INJECTION INTRAVENOUS at 08:16

## 2022-09-06 ENCOUNTER — ANTICOAGULATION VISIT (OUTPATIENT)
Dept: CARDIOLOGY | Facility: CLINIC | Age: 77
End: 2022-09-06

## 2022-09-06 DIAGNOSIS — Z95.2 HX OF AORTIC VALVE REPLACEMENT, MECHANICAL: Primary | ICD-10-CM

## 2022-09-06 DIAGNOSIS — Z79.01 LONG TERM (CURRENT) USE OF ANTICOAGULANTS: ICD-10-CM

## 2022-09-06 LAB — INR PPP: 3

## 2022-09-19 ENCOUNTER — ANTICOAGULATION VISIT (OUTPATIENT)
Dept: CARDIOLOGY | Facility: CLINIC | Age: 77
End: 2022-09-19

## 2022-09-19 DIAGNOSIS — Z79.01 LONG TERM (CURRENT) USE OF ANTICOAGULANTS: ICD-10-CM

## 2022-09-19 DIAGNOSIS — Z95.2 HX OF AORTIC VALVE REPLACEMENT, MECHANICAL: Primary | ICD-10-CM

## 2022-09-19 LAB — INR PPP: 3.2

## 2022-10-03 ENCOUNTER — ANTICOAGULATION VISIT (OUTPATIENT)
Dept: CARDIOLOGY | Facility: CLINIC | Age: 77
End: 2022-10-03

## 2022-10-03 DIAGNOSIS — Z95.2 HX OF AORTIC VALVE REPLACEMENT, MECHANICAL: Primary | ICD-10-CM

## 2022-10-03 DIAGNOSIS — Z79.01 LONG TERM (CURRENT) USE OF ANTICOAGULANTS: ICD-10-CM

## 2022-10-03 LAB — INR PPP: 2.7

## 2022-10-14 ENCOUNTER — HOSPITAL ENCOUNTER (OUTPATIENT)
Facility: HOSPITAL | Age: 77
Setting detail: OBSERVATION
Discharge: HOME | End: 2022-10-15
Attending: EMERGENCY MEDICINE | Admitting: INTERNAL MEDICINE

## 2022-10-14 ENCOUNTER — APPOINTMENT (OUTPATIENT)
Dept: CT IMAGING | Facility: HOSPITAL | Age: 77
End: 2022-10-14

## 2022-10-14 DIAGNOSIS — I63.40 CEREBROVASCULAR ACCIDENT (CVA) DUE TO EMBOLISM OF CEREBRAL ARTERY: ICD-10-CM

## 2022-10-14 DIAGNOSIS — I63.9 CEREBROVASCULAR ACCIDENT (CVA), UNSPECIFIED MECHANISM: Primary | ICD-10-CM

## 2022-10-14 LAB
ANION GAP SERPL CALCULATED.3IONS-SCNC: 13 MMOL/L (ref 5–15)
BASOPHILS # BLD AUTO: 0.1 10*3/MM3 (ref 0–0.2)
BASOPHILS # BLD AUTO: 0.1 10*3/MM3 (ref 0–0.2)
BASOPHILS NFR BLD AUTO: 1.3 % (ref 0–1.5)
BASOPHILS NFR BLD AUTO: 1.5 % (ref 0–1.5)
BUN SERPL-MCNC: 11 MG/DL (ref 8–23)
BUN/CREAT SERPL: 11.2 (ref 7–25)
CALCIUM SPEC-SCNC: 9.8 MG/DL (ref 8.6–10.5)
CHLORIDE SERPL-SCNC: 100 MMOL/L (ref 98–107)
CO2 SERPL-SCNC: 26 MMOL/L (ref 22–29)
CREAT SERPL-MCNC: 0.98 MG/DL (ref 0.57–1)
DEPRECATED RDW RBC AUTO: 49 FL (ref 37–54)
DEPRECATED RDW RBC AUTO: 49 FL (ref 37–54)
EGFRCR SERPLBLD CKD-EPI 2021: 60.3 ML/MIN/1.73
EOSINOPHIL # BLD AUTO: 0.1 10*3/MM3 (ref 0–0.4)
EOSINOPHIL # BLD AUTO: 0.1 10*3/MM3 (ref 0–0.4)
EOSINOPHIL NFR BLD AUTO: 1.2 % (ref 0.3–6.2)
EOSINOPHIL NFR BLD AUTO: 1.4 % (ref 0.3–6.2)
ERYTHROCYTE [DISTWIDTH] IN BLOOD BY AUTOMATED COUNT: 14.9 % (ref 12.3–15.4)
ERYTHROCYTE [DISTWIDTH] IN BLOOD BY AUTOMATED COUNT: 14.9 % (ref 12.3–15.4)
GLUCOSE SERPL-MCNC: 113 MG/DL (ref 65–99)
HCT VFR BLD AUTO: 39.9 % (ref 34–46.6)
HCT VFR BLD AUTO: 40.1 % (ref 34–46.6)
HGB BLD-MCNC: 12.8 G/DL (ref 12–15.9)
HGB BLD-MCNC: 12.8 G/DL (ref 12–15.9)
HOLD SPECIMEN: NORMAL
INR PPP: 2.17 (ref 2–3)
INR PPP: 2.29 (ref 2–3)
LYMPHOCYTES # BLD AUTO: 0.9 10*3/MM3 (ref 0.7–3.1)
LYMPHOCYTES # BLD AUTO: 1.2 10*3/MM3 (ref 0.7–3.1)
LYMPHOCYTES NFR BLD AUTO: 18.1 % (ref 19.6–45.3)
LYMPHOCYTES NFR BLD AUTO: 20.6 % (ref 19.6–45.3)
MCH RBC QN AUTO: 30.2 PG (ref 26.6–33)
MCH RBC QN AUTO: 30.3 PG (ref 26.6–33)
MCHC RBC AUTO-ENTMCNC: 31.8 G/DL (ref 31.5–35.7)
MCHC RBC AUTO-ENTMCNC: 32 G/DL (ref 31.5–35.7)
MCV RBC AUTO: 94.6 FL (ref 79–97)
MCV RBC AUTO: 94.7 FL (ref 79–97)
MONOCYTES # BLD AUTO: 0.5 10*3/MM3 (ref 0.1–0.9)
MONOCYTES # BLD AUTO: 0.7 10*3/MM3 (ref 0.1–0.9)
MONOCYTES NFR BLD AUTO: 11.2 % (ref 5–12)
MONOCYTES NFR BLD AUTO: 11.4 % (ref 5–12)
NEUTROPHILS NFR BLD AUTO: 3 10*3/MM3 (ref 1.7–7)
NEUTROPHILS NFR BLD AUTO: 4.4 10*3/MM3 (ref 1.7–7)
NEUTROPHILS NFR BLD AUTO: 65.5 % (ref 42.7–76)
NEUTROPHILS NFR BLD AUTO: 67.8 % (ref 42.7–76)
NRBC BLD AUTO-RTO: 0 /100 WBC (ref 0–0.2)
NRBC BLD AUTO-RTO: 0.1 /100 WBC (ref 0–0.2)
PLATELET # BLD AUTO: 285 10*3/MM3 (ref 140–450)
PLATELET # BLD AUTO: 288 10*3/MM3 (ref 140–450)
PMV BLD AUTO: 6.9 FL (ref 6–12)
PMV BLD AUTO: 7.3 FL (ref 6–12)
POTASSIUM SERPL-SCNC: 4.3 MMOL/L (ref 3.5–5.2)
PROTHROMBIN TIME: 21.4 SECONDS (ref 19.4–28.5)
PROTHROMBIN TIME: 22.5 SECONDS (ref 19.4–28.5)
RBC # BLD AUTO: 4.22 10*6/MM3 (ref 3.77–5.28)
RBC # BLD AUTO: 4.24 10*6/MM3 (ref 3.77–5.28)
SODIUM SERPL-SCNC: 139 MMOL/L (ref 136–145)
WBC NRBC COR # BLD: 4.6 10*3/MM3 (ref 3.4–10.8)
WBC NRBC COR # BLD: 6.6 10*3/MM3 (ref 3.4–10.8)

## 2022-10-14 PROCEDURE — G0378 HOSPITAL OBSERVATION PER HR: HCPCS

## 2022-10-14 PROCEDURE — 85610 PROTHROMBIN TIME: CPT | Performed by: NURSE PRACTITIONER

## 2022-10-14 PROCEDURE — 80048 BASIC METABOLIC PNL TOTAL CA: CPT | Performed by: EMERGENCY MEDICINE

## 2022-10-14 PROCEDURE — 93005 ELECTROCARDIOGRAM TRACING: CPT | Performed by: EMERGENCY MEDICINE

## 2022-10-14 PROCEDURE — 70496 CT ANGIOGRAPHY HEAD: CPT

## 2022-10-14 PROCEDURE — 36415 COLL VENOUS BLD VENIPUNCTURE: CPT | Performed by: NURSE PRACTITIONER

## 2022-10-14 PROCEDURE — 85610 PROTHROMBIN TIME: CPT | Performed by: EMERGENCY MEDICINE

## 2022-10-14 PROCEDURE — 80048 BASIC METABOLIC PNL TOTAL CA: CPT | Performed by: NURSE PRACTITIONER

## 2022-10-14 PROCEDURE — 0 IOPAMIDOL PER 1 ML: Performed by: INTERNAL MEDICINE

## 2022-10-14 PROCEDURE — 85025 COMPLETE CBC W/AUTO DIFF WBC: CPT | Performed by: EMERGENCY MEDICINE

## 2022-10-14 PROCEDURE — 85025 COMPLETE CBC W/AUTO DIFF WBC: CPT | Performed by: NURSE PRACTITIONER

## 2022-10-14 PROCEDURE — 99284 EMERGENCY DEPT VISIT MOD MDM: CPT

## 2022-10-14 PROCEDURE — 96374 THER/PROPH/DIAG INJ IV PUSH: CPT

## 2022-10-14 PROCEDURE — 70498 CT ANGIOGRAPHY NECK: CPT

## 2022-10-14 PROCEDURE — 25010000002 HYDRALAZINE PER 20 MG: Performed by: NURSE PRACTITIONER

## 2022-10-14 RX ORDER — WARFARIN SODIUM 2.5 MG/1
2.5 TABLET ORAL
Status: COMPLETED | OUTPATIENT
Start: 2022-10-14 | End: 2022-10-14

## 2022-10-14 RX ORDER — ATORVASTATIN CALCIUM 10 MG/1
10 TABLET, FILM COATED ORAL DAILY
Status: DISCONTINUED | OUTPATIENT
Start: 2022-10-15 | End: 2022-10-15 | Stop reason: HOSPADM

## 2022-10-14 RX ORDER — SODIUM CHLORIDE 0.9 % (FLUSH) 0.9 %
3 SYRINGE (ML) INJECTION EVERY 12 HOURS SCHEDULED
Status: DISCONTINUED | OUTPATIENT
Start: 2022-10-14 | End: 2022-10-15 | Stop reason: HOSPADM

## 2022-10-14 RX ORDER — SODIUM CHLORIDE 0.9 % (FLUSH) 0.9 %
3-10 SYRINGE (ML) INJECTION AS NEEDED
Status: DISCONTINUED | OUTPATIENT
Start: 2022-10-14 | End: 2022-10-15 | Stop reason: HOSPADM

## 2022-10-14 RX ORDER — PANTOPRAZOLE SODIUM 40 MG/1
40 TABLET, DELAYED RELEASE ORAL
Status: DISCONTINUED | OUTPATIENT
Start: 2022-10-15 | End: 2022-10-15 | Stop reason: HOSPADM

## 2022-10-14 RX ORDER — ATENOLOL 100 MG/1
50 TABLET ORAL 2 TIMES DAILY
COMMUNITY

## 2022-10-14 RX ORDER — ATENOLOL 50 MG/1
50 TABLET ORAL 2 TIMES DAILY
Status: DISCONTINUED | OUTPATIENT
Start: 2022-10-14 | End: 2022-10-15 | Stop reason: HOSPADM

## 2022-10-14 RX ORDER — NITROGLYCERIN 0.4 MG/1
0.4 TABLET SUBLINGUAL
Status: DISCONTINUED | OUTPATIENT
Start: 2022-10-14 | End: 2022-10-15 | Stop reason: HOSPADM

## 2022-10-14 RX ORDER — SODIUM CHLORIDE 0.9 % (FLUSH) 0.9 %
10 SYRINGE (ML) INJECTION AS NEEDED
Status: DISCONTINUED | OUTPATIENT
Start: 2022-10-14 | End: 2022-10-15 | Stop reason: HOSPADM

## 2022-10-14 RX ORDER — LISINOPRIL 5 MG/1
10 TABLET ORAL EVERY EVENING
Status: DISCONTINUED | OUTPATIENT
Start: 2022-10-14 | End: 2022-10-15

## 2022-10-14 RX ORDER — ONDANSETRON 4 MG/1
4 TABLET, FILM COATED ORAL EVERY 6 HOURS PRN
Status: DISCONTINUED | OUTPATIENT
Start: 2022-10-14 | End: 2022-10-15 | Stop reason: HOSPADM

## 2022-10-14 RX ORDER — ASPIRIN 81 MG/1
81 TABLET ORAL DAILY
Status: DISCONTINUED | OUTPATIENT
Start: 2022-10-15 | End: 2022-10-15 | Stop reason: HOSPADM

## 2022-10-14 RX ORDER — CHOLECALCIFEROL (VITAMIN D3) 125 MCG
5 CAPSULE ORAL NIGHTLY PRN
Status: DISCONTINUED | OUTPATIENT
Start: 2022-10-14 | End: 2022-10-15 | Stop reason: HOSPADM

## 2022-10-14 RX ORDER — HYDRALAZINE HYDROCHLORIDE 20 MG/ML
20 INJECTION INTRAMUSCULAR; INTRAVENOUS EVERY 6 HOURS PRN
Status: DISCONTINUED | OUTPATIENT
Start: 2022-10-14 | End: 2022-10-15 | Stop reason: HOSPADM

## 2022-10-14 RX ORDER — ONDANSETRON 2 MG/ML
4 INJECTION INTRAMUSCULAR; INTRAVENOUS EVERY 6 HOURS PRN
Status: DISCONTINUED | OUTPATIENT
Start: 2022-10-14 | End: 2022-10-15 | Stop reason: HOSPADM

## 2022-10-14 RX ORDER — WARFARIN SODIUM 5 MG/1
2.5 TABLET ORAL SEE ADMIN INSTRUCTIONS
COMMUNITY
End: 2022-10-14

## 2022-10-14 RX ORDER — ACETAMINOPHEN 325 MG/1
650 TABLET ORAL EVERY 4 HOURS PRN
Status: DISCONTINUED | OUTPATIENT
Start: 2022-10-14 | End: 2022-10-15 | Stop reason: HOSPADM

## 2022-10-14 RX ADMIN — WARFARIN 2.5 MG: 2.5 TABLET ORAL at 23:41

## 2022-10-14 RX ADMIN — LISINOPRIL 10 MG: 5 TABLET ORAL at 21:12

## 2022-10-14 RX ADMIN — IOPAMIDOL 100 ML: 755 INJECTION, SOLUTION INTRAVENOUS at 19:03

## 2022-10-14 RX ADMIN — ATENOLOL 50 MG: 50 TABLET ORAL at 21:12

## 2022-10-14 RX ADMIN — HYDRALAZINE HYDROCHLORIDE 20 MG: 20 INJECTION INTRAMUSCULAR; INTRAVENOUS at 16:28

## 2022-10-14 RX ADMIN — Medication 3 ML: at 23:41

## 2022-10-14 NOTE — ED PROVIDER NOTES
Subjective   History of Present Illness  Patient is 76-year-old female who states 3 days ago she woke up in the night with mild headache that lasted minutes but improved.  She complained of weakness to her left hand since that time.  She went to her family physician's office today.  They ordered an outpatient CT scan of head without contrast.  She was notified to come to the ED for possible CVA.        Review of Systems   Constitutional: Negative for chills and fever.   HENT: Negative for congestion and sore throat.    Eyes: Negative for visual disturbance.   Respiratory: Negative for cough and shortness of breath.    Cardiovascular: Negative for chest pain.   Gastrointestinal: Negative for abdominal pain, diarrhea and vomiting.   Endocrine: Negative for polyuria.   Genitourinary: Negative for dysuria and flank pain.   Musculoskeletal: Negative for back pain.   Neurological: Positive for weakness and headaches. Negative for dizziness.   Psychiatric/Behavioral: Negative for confusion.   A complete review of systems was obtained and is otherwise negative    Past Medical History:   Diagnosis Date   • Hyperlipidemia    • Mitral valve prolapse        Allergies   Allergen Reactions   • Ciprofloxacin Unknown (See Comments)   • Codeine Unknown (See Comments)       Past Surgical History:   Procedure Laterality Date   • APPENDECTOMY     • CARDIOVERSION     • CHOLECYSTECTOMY     • MITRAL VALVE REPLACEMENT  2010    Mechanical    • TONSILLECTOMY         Family History   Problem Relation Age of Onset   • Heart disease Mother    • Heart attack Mother    • Hypertension Mother        Social History     Socioeconomic History   • Marital status:    Tobacco Use   • Smoking status: Former     Types: Cigarettes     Quit date:      Years since quittin.7   • Smokeless tobacco: Never   Vaping Use   • Vaping Use: Never used   Substance and Sexual Activity   • Alcohol use: Yes     Comment: social   • Drug use: Never   •  Sexual activity: Defer           Objective   Physical Exam  Neurologic exam shows patient mild left hand weakness.  She has no other focal neurologic deficits.  HEENT exam shows TMs to be clear.  Oropharynx comers but sclera is nonicteric.  Neck has no adenopathy JVD or bruits.  Lungs are clear.  Heart has regular rate rhythm without murmur or gallop.  Chest is nontender.  Abdomen is soft nontender.  Patient has normal bowel sounds without rebound or guarding.  Back has no CVA tenderness.  Extremity exam is no cyanosis or edema.  Procedures     My EKG interpretation shows a fibrillation at a rate of 89 with no acute ST change.  This unchanged previous tracing.      ED Course            Results for orders placed or performed during the hospital encounter of 10/14/22   Protime-INR    Specimen: Arm, Left; Blood   Result Value Ref Range    Protime 22.5 19.4 - 28.5 Seconds    INR 2.29 2.00 - 3.00   CBC Auto Differential    Specimen: Arm, Left; Blood   Result Value Ref Range    WBC 4.60 3.40 - 10.80 10*3/mm3    RBC 4.24 3.77 - 5.28 10*6/mm3    Hemoglobin 12.8 12.0 - 15.9 g/dL    Hematocrit 40.1 34.0 - 46.6 %    MCV 94.7 79.0 - 97.0 fL    MCH 30.2 26.6 - 33.0 pg    MCHC 31.8 31.5 - 35.7 g/dL    RDW 14.9 12.3 - 15.4 %    RDW-SD 49.0 37.0 - 54.0 fl    MPV 6.9 6.0 - 12.0 fL    Platelets 288 140 - 450 10*3/mm3    Neutrophil % 65.5 42.7 - 76.0 %    Lymphocyte % 20.6 19.6 - 45.3 %    Monocyte % 11.2 5.0 - 12.0 %    Eosinophil % 1.2 0.3 - 6.2 %    Basophil % 1.5 0.0 - 1.5 %    Neutrophils, Absolute 3.00 1.70 - 7.00 10*3/mm3    Lymphocytes, Absolute 0.90 0.70 - 3.10 10*3/mm3    Monocytes, Absolute 0.50 0.10 - 0.90 10*3/mm3    Eosinophils, Absolute 0.10 0.00 - 0.40 10*3/mm3    Basophils, Absolute 0.10 0.00 - 0.20 10*3/mm3    nRBC 0.0 0.0 - 0.2 /100 WBC   ECG 12 Lead   Result Value Ref Range    QT Interval 397 ms     No radiology results for the last day                                    MDM  Number of Diagnoses or Management  Options  Diagnosis management comments: I did review the patient's outpatient CT scan of the head without contrast which shows a acute versus subacute right infarct.  Patient does have focal deficit in her left hand however these are symptoms that are 3 days old so she is not a tPA candidate.  Patient is on A. fib with being on Coumadin.  INR is pending at this time.  Patient will be admitted for further neurologic evaluation.  Did speak with patient's family physician.       Amount and/or Complexity of Data Reviewed  Clinical lab tests: reviewed  Tests in the medicine section of CPT®: reviewed    Risk of Complications, Morbidity, and/or Mortality  Presenting problems: high  Diagnostic procedures: high  Management options: high    Patient Progress  Patient progress: stable      Final diagnoses:   Cerebrovascular accident (CVA), unspecified mechanism (HCC)       ED Disposition  ED Disposition     ED Disposition   Decision to Admit    Condition   --    Comment   Level of Care: Telemetry [5]   Diagnosis: CVA (cerebral vascular accident) (HCC) [272419]   Admitting Physician: ASHWIN STEINBERG [[id number]   Bed Request Comments: sunny               No follow-up provider specified.       Medication List      No changes were made to your prescriptions during this visit.          Wesly Morse MD  10/14/22 [id number]

## 2022-10-14 NOTE — H&P
Patient Care Team:  Miguel Angel Mancera MD as PCP - [facility]  Ahmet Lynn MD as Consulting Physician (Cardiology)    Chief complaint CVA    Subjective     Patient is a 76 y.o. female who presents with CVA. She saw her PCP today and was ordered a CT head that showed CVA. She was told to proceed to the ED. She states that a few nights ago she woke with a headache and noticed difficulty with her left hand feeling numb, tingling, and difficult to grasp objects. She denies any other complaints and has no further complaints of neuro deficits and on examination none are noticed. Her hand while improved is still weak and she is hypertensive. She is on anticoagulation for mitral valve replacement and persistent/chronic atrial fibrillation. She will be admitted for further work up and neurological consult.      Review of Systems   Constitutional: Positive for activity change.   HENT: Negative.    Respiratory: Negative.    Cardiovascular: Negative.    Gastrointestinal: Negative.    Genitourinary: Negative.    Musculoskeletal: Negative.    Neurological: Negative.         Left hand weakness   Psychiatric/Behavioral: Negative.           History  Past Medical History:   Diagnosis Date   • Hyperlipidemia    • Mitral valve prolapse      Past Surgical History:   Procedure Laterality Date   • APPENDECTOMY     • CARDIOVERSION     • CHOLECYSTECTOMY     • MITRAL VALVE REPLACEMENT  2010    Mechanical    • TONSILLECTOMY       Family History   Problem Relation Age of Onset   • Heart disease Mother    • Heart attack Mother    • Hypertension Mother      Social History     Tobacco Use   • Smoking status: Former     Types: Cigarettes     Quit date:      Years since quittin.7   • Smokeless tobacco: Never   Vaping Use   • Vaping Use: Never used   Substance Use Topics   • Alcohol use: Yes     Comment: social   • Drug use: Never     (Not in a hospital admission)    Allergies:  Ciprofloxacin and Codeine    Objective     Vital  Signs  Temp:  [97.4 °F (36.3 °C)] 97.4 °F (36.3 °C)  Heart Rate:  [74-82] 79  Resp:  [20] 20  BP: (182-190)/() 190/98       Physical Exam  Vitals reviewed.   Constitutional:       Appearance: She is not ill-appearing.   HENT:      Head: Normocephalic and atraumatic.      Right Ear: External ear normal.      Left Ear: External ear normal.      Nose: Nose normal.      Mouth/Throat:      Mouth: Mucous membranes are moist.   Eyes:      General:         Right eye: No discharge.         Left eye: No discharge.   Cardiovascular:      Rate and Rhythm: Normal rate. Rhythm irregular.      Heart sounds: Normal heart sounds.   Pulmonary:      Effort: Pulmonary effort is normal.   Abdominal:      General: Bowel sounds are normal.      Palpations: Abdomen is soft.   Musculoskeletal:         General: Normal range of motion.      Cervical back: Normal range of motion.   Skin:     General: Skin is warm.      Coloration: Skin is pale.   Neurological:      Mental Status: She is alert and oriented to person, place, and time.   Psychiatric:         Behavior: Behavior normal.          Results Review:     Imaging Results (Last 24 Hours)     ** No results found for the last 24 hours. **           Lab Results (last 24 hours)     Procedure Component Value Units Date/Time    Extra Tubes [[id number]] Collected: 10/14/22 1431    Specimen: Blood, Venous Line Updated: 10/14/22 1531    Narrative:      The following orders were created for panel order Extra Tubes.  Procedure                               Abnormality         Status                     ---------                               -----------         ------                     Gold Top - SST[[id number]]                                   Final result                 Please view results for these tests on the individual orders.    [facility] [[id number]] Collected: 10/14/22 1431    Specimen: Blood Updated: 10/14/22 1531     Extra Tube Hold for add-ons.     Comment: Auto resulted.        Basic Metabolic Panel [[id number]]  (Abnormal) Collected: 10/14/22 1431    Specimen: Blood from Arm, Left Updated: 10/14/22 1456     Glucose 113 mg/dL      BUN 11 mg/dL      Creatinine 0.98 mg/dL      Sodium 139 mmol/L      Potassium 4.3 mmol/L      Chloride 100 mmol/L      CO2 26.0 mmol/L      Calcium 9.8 mg/dL      BUN/Creatinine Ratio 11.2     Anion Gap 13.0 mmol/L      eGFR 60.3 mL/min/1.73      Comment: National Kidney Foundation and American Society of Nephrology (ASN) Task Force recommended calculation based on the Chronic Kidney Disease Epidemiology Collaboration (CKD-EPI) equation refit without adjustment for race.       Narrative:      GFR Normal >60  Chronic Kidney Disease <60  Kidney Failure <15      Protime-INR [[id number]]  (Normal) Collected: 10/14/22 1431    Specimen: Blood from Arm, Left Updated: 10/14/22 1451     Protime 22.5 Seconds      INR 2.29    CBC & Differential [[id number]]  (Normal) Collected: 10/14/22 1431    Specimen: Blood from Arm, Left Updated: 10/14/22 1437    Narrative:      The following orders were created for panel order CBC & Differential.  Procedure                               Abnormality         Status                     ---------                               -----------         ------                     CBC Auto Differential[[id number]]        Normal              Final result                 Please view results for these tests on the individual orders.    CBC Auto Differential [[id number]]  (Normal) Collected: 10/14/22 1431    Specimen: Blood from Arm, Left Updated: 10/14/22 1437     WBC 4.60 10*3/mm3      RBC 4.24 10*6/mm3      Hemoglobin 12.8 g/dL      Hematocrit 40.1 %      MCV 94.7 fL      MCH 30.2 pg      MCHC 31.8 g/dL      RDW 14.9 %      RDW-SD 49.0 fl      MPV 6.9 fL      Platelets 288 10*3/mm3      Neutrophil % 65.5 %      Lymphocyte % 20.6 %      Monocyte % 11.2 %      Eosinophil % 1.2 %      Basophil % 1.5 %      Neutrophils, Absolute 3.00 10*3/mm3       Lymphocytes, Absolute 0.90 10*3/mm3      Monocytes, Absolute 0.50 10*3/mm3      Eosinophils, Absolute 0.10 10*3/mm3      Basophils, Absolute 0.10 10*3/mm3      nRBC 0.0 /100 WBC            I reviewed the patient's new clinical results.    Assessment & Plan       CVA (cerebral vascular accident) (HCC)  -CT scan will be upload it was at another facility  -neuro consult  -CTA head and neck  -recently had ECHO 8/23/22 with EF 60% and negative stress test  -occupation therapy    HTN-atenolol/lisinopril with prn hydralazine  AFIB-warfarin with controlled rate  HDL-statin will most likely be increased prior to dc     Diet: healthy heart  DVT prophylaxis:warfain  GI prophylaxis:protonix  Code status:full      I discussed the patient's findings and my recommendations with patient.     Mahi Napoles, MANUELA  10/14/22  15:40 EDT

## 2022-10-14 NOTE — PLAN OF CARE
Problem: Adult Inpatient Plan of Care  Goal: Plan of Care Review  Outcome: Ongoing, Progressing  Flowsheets (Taken 10/14/2022 7451)  Progress: no change  Goal: Patient-Specific Goal (Individualized)  Outcome: Ongoing, Progressing  Goal: Absence of Hospital-Acquired Illness or Injury  Outcome: Ongoing, Progressing  Goal: Optimal Comfort and Wellbeing  Outcome: Ongoing, Progressing  Goal: Readiness for Transition of Care  Outcome: Ongoing, Progressing   Goal Outcome Evaluation:           Progress: no change

## 2022-10-14 NOTE — ED NOTES
Nursing report ED to floor  Melody Hernandez  76 y.o.  female    HPI:   Chief Complaint   Patient presents with    Headache     Headache started Tuesday morning, and her left hand didn't feel right unable to , pt was sent in by PMD for evaluation.         Admitting doctor:   Sandy Diaz MD    Admitting diagnosis:   The encounter diagnosis was Cerebrovascular accident (CVA), unspecified mechanism (HCC).    Code status:   Current Code Status       Date Active Code Status Order ID Comments User Context       10/14/2022 1446 CPR (Attempt to Resuscitate) [id number]  Mahi Napoles, APRN ED        Question Answer    Code Status (Patient has no pulse and is not breathing) CPR (Attempt to Resuscitate)    Medical Interventions (Patient has pulse or is breathing) Full Support    Level Of Support Discussed With Patient                    Allergies:   Ciprofloxacin and Codeine    Isolation:  No active isolations     Fall Risk:  Fall Risk Assessment was completed, and patient is at low risk for falls.   Predictive Model Details         8 (Low) Factor Value    Calculated 10/14/2022 14:08 Age 76    Risk of Fall Model Musculoskeletal Assessment WDL     Respiratory Rate 20     Skin Assessment WDL     Magnesium not on file     Financial Class Medicare     Drug Use No     Tobacco Use Quit     Sanford Scale not on file     Peripheral Vascular Assessment WDL     Calcium not on file     Gastrointestinal Assessment WDL     Albumin not on file     Cardiac Assessment WDL     Diastolic      Total Bilirubin not on file     Chloride not on file     Potassium not on file     Days after Admission 0.014     Creatinine not on file     ALT not on file         Weight:       10/14/22  1346   Weight: 57.6 kg (127 lb)       Intake and Output  No intake or output data in the 24 hours ending 10/14/22 1653    Diet:   Dietary Orders (From admission, onward)       Start     Ordered    10/14/22 1559  Diet Cardiac; Healthy Heart  Diet Effective Now         Question Answer Comment   Diet / Texture / Consistency Cardiac    Select Type: Healthy Heart        10/14/22 1558                     Most recent vitals:   Vitals:    10/14/22 1603 10/14/22 1628 10/14/22 1634 10/14/22 1646   BP: 176/98 (!) 173/105 153/94 153/92   BP Location:       Patient Position:       Pulse: 75 70 77 88   Resp:       Temp:       TempSrc:       SpO2:    99%   Weight:       Height:           Active LDAs/IV Access:   Lines, Drains & Airways       Active LDAs       Name Placement date Placement time Site Days    Peripheral IV 10/14/22 1431 Left Antecubital 10/14/22  1431  Antecubital  less than 1                    Skin Condition:   Skin Assessments (last day)       None             Labs (abnormal labs have a star):   Labs Reviewed   BASIC METABOLIC PANEL - Abnormal; Notable for the following components:       Result Value    Glucose 113 (*)     All other components within normal limits    Narrative:     GFR Normal >60  Chronic Kidney Disease <60  Kidney Failure <15     PROTIME-INR - Normal   CBC WITH AUTO DIFFERENTIAL - Normal   CBC AND DIFFERENTIAL    Narrative:     The following orders were created for panel order CBC & Differential.  Procedure                               Abnormality         Status                     ---------                               -----------         ------                     CBC Auto Differential[[id number]]        Normal              Final result                 Please view results for these tests on the individual orders.   EXTRA TUBES    Narrative:     The following orders were created for panel order Extra Tubes.  Procedure                               Abnormality         Status                     ---------                               -----------         ------                     Gold Top - SST[[id number]]                                   Final result                 Please view results for these tests on the individual orders.   GOLD TOP - SST        LOC: Person, Place, Time, and Situation    Telemetry:  Telemetry    Cardiac Monitoring Ordered: yes    EKG:   ECG 12 Lead   Preliminary Result   HEART RATE= 89  bpm   RR Interval= 672  ms   FL Interval=   ms   P Horizontal Axis=   deg   P Front Axis=   deg   QRSD Interval= 111  ms   QT Interval= 397  ms   QRS Axis= 74  deg   T Wave Axis= 126  deg   - ABNORMAL ECG -   Atrial fibrillation   LVH with secondary repolarization abnormality   Repolarization abnormalities   Anterior infarct, old   When compared with ECG of 13-May-2016 12:20:10,   Significant change in rhythm: previously sinus   Significant axis, voltage or hypertrophy change   Electronically Signed By:    Date and Time of Study: 2022-10-14 14:16:21          Medications Given in the ED:   Medications   sodium chloride 0.9 % flush 10 mL (has no administration in time range)   sodium chloride 0.9 % flush 3 mL (has no administration in time range)   sodium chloride 0.9 % flush 3-10 mL (has no administration in time range)   nitroglycerin (NITROSTAT) SL tablet 0.4 mg (has no administration in time range)   acetaminophen (TYLENOL) tablet 650 mg (has no administration in time range)   melatonin tablet 5 mg (has no administration in time range)   ondansetron (ZOFRAN) tablet 4 mg (has no administration in time range)     Or   ondansetron (ZOFRAN) injection 4 mg (has no administration in time range)   hydrALAZINE (APRESOLINE) injection 20 mg (20 mg Intravenous Given 10/14/22 1628)   pantoprazole (PROTONIX) EC tablet 40 mg (has no administration in time range)       Imaging results:  No radiology results for the last day    Social issues:   Social History     Socioeconomic History    Marital status:    Tobacco Use    Smoking status: Former     Types: Cigarettes     Quit date:      Years since quittin.7    Smokeless tobacco: Never   Vaping Use    Vaping Use: Never used   Substance and Sexual Activity    Alcohol use: Yes     Comment: social    Drug  use: Never    Sexual activity: Defer       NIH Stroke Scale:  Interval: (not recorded)  1a. Level of Consciousness: (not recorded)  1b. LOC Questions: (not recorded)  1c. LOC Commands: (not recorded)  2. Best Gaze: (not recorded)  3. Visual: (not recorded)  4. Facial Palsy: (not recorded)  5a. Motor Arm, Left: (not recorded)  5b. Motor Arm, Right: (not recorded)  6a. Motor Leg, Left: (not recorded)  6b. Motor Leg, Right: (not recorded)  7. Limb Ataxia: (not recorded)  8. Sensory: (not recorded)  9. Best Language: (not recorded)  10. Dysarthria: (not recorded)  11. Extinction and Inattention (formerly Neglect): (not recorded)    Total (NIH Stroke Scale): (not recorded)     Additional notable assessment information:     Nursing report ED to floor:      Sherrie Khan RN   10/14/22 16:53 EDT

## 2022-10-14 NOTE — CASE MANAGEMENT/SOCIAL WORK
Discharge Planning Assessment  [facility]     Patient Name: Melody Hernandez  MRN: [id number]  Today's Date: 10/14/2022    Admit Date: 10/14/2022    Plan: Anticipate Routine Home   Discharge Needs Assessment     Row Name 10/14/22 1520       Living Environment    People in Home alone    Current Living Arrangements home    Primary Care Provided by self    Provides Primary Care For no one    Able to Return to Prior Arrangements yes       Resource/Environmental Concerns    Resource/Environmental Concerns none    Transportation Concerns none       Transition Planning    Patient/Family Anticipates Transition to home    Patient/Family Anticipated Services at Transition none    Transportation Anticipated car, drives self;family or friend will provide       Discharge Needs Assessment    Readmission Within the Last 30 Days no previous admission in last 30 days    Equipment Currently Used at Home bp cuff    Concerns to be Addressed denies needs/concerns at this time    Anticipated Changes Related to Illness none    Equipment Needed After Discharge none               Discharge Plan     Row Name 10/14/22 1520       Plan    Plan Anticipate Routine Home    Patient/Family in Agreement with Plan yes    Plan Comments Met with Patient and family at bedside Lives at home alone. IADL's, Normally drives self but family will provide transportation at discharge. PCP and Pharmacy verified, denies any needs at this time. d/c barriers: Neuro consult              Continued Care and Services - Admitted Since 10/14/2022    Coordination has not been started for this encounter.       Expected Discharge Date and Time     Expected Discharge Date Expected Discharge Time    Oct 15, 2022          Demographic Summary     Row Name 10/14/22 1520       General Information    Admission Type observation    Arrived From emergency department    Referral Source admission list    Preferred Language English               Functional Status     Row Name 10/14/22 1526        Functional Status    Usual Activity Tolerance good    Current Activity Tolerance good       Functional Status, IADL    Medications independent    Meal Preparation independent    Housekeeping independent    Laundry independent    Shopping independent       Mental Status    General Appearance WDL WDL       Mental Status Summary    Recent Changes in Mental Status/Cognitive Functioning no changes                      Patient Forms     Row Name 10/14/22 1522       Patient Forms    Important Message from Medicare ([facility]) --  [place] 10/14 per reg              Met with patient at bedside wearing mask and goggles, Spent less than 15 minutes in room at greater than 6 feet distance.       Tran Quintero, RN

## 2022-10-15 ENCOUNTER — READMISSION MANAGEMENT (OUTPATIENT)
Dept: CALL CENTER | Facility: HOSPITAL | Age: 77
End: 2022-10-15

## 2022-10-15 VITALS
OXYGEN SATURATION: 92 % | HEART RATE: 90 BPM | WEIGHT: 127.21 LBS | DIASTOLIC BLOOD PRESSURE: 96 MMHG | TEMPERATURE: 98.1 F | HEIGHT: 63 IN | RESPIRATION RATE: 12 BRPM | BODY MASS INDEX: 22.54 KG/M2 | SYSTOLIC BLOOD PRESSURE: 174 MMHG

## 2022-10-15 PROBLEM — Z95.2 HX OF AORTIC VALVE REPLACEMENT, MECHANICAL: Status: RESOLVED | Noted: 2019-06-24 | Resolved: 2022-10-15

## 2022-10-15 LAB
ANION GAP SERPL CALCULATED.3IONS-SCNC: 11 MMOL/L (ref 5–15)
BUN SERPL-MCNC: 12 MG/DL (ref 8–23)
BUN/CREAT SERPL: 12.2 (ref 7–25)
CALCIUM SPEC-SCNC: 9.6 MG/DL (ref 8.6–10.5)
CHLORIDE SERPL-SCNC: 103 MMOL/L (ref 98–107)
CO2 SERPL-SCNC: 26 MMOL/L (ref 22–29)
CREAT SERPL-MCNC: 0.98 MG/DL (ref 0.57–1)
EGFRCR SERPLBLD CKD-EPI 2021: 60.3 ML/MIN/1.73
GLUCOSE SERPL-MCNC: 98 MG/DL (ref 65–99)
POTASSIUM SERPL-SCNC: 4.4 MMOL/L (ref 3.5–5.2)
SODIUM SERPL-SCNC: 140 MMOL/L (ref 136–145)

## 2022-10-15 PROCEDURE — 99214 OFFICE O/P EST MOD 30 MIN: CPT | Performed by: INTERNAL MEDICINE

## 2022-10-15 PROCEDURE — G0378 HOSPITAL OBSERVATION PER HR: HCPCS

## 2022-10-15 PROCEDURE — 99204 OFFICE O/P NEW MOD 45 MIN: CPT | Performed by: PSYCHIATRY & NEUROLOGY

## 2022-10-15 RX ORDER — WARFARIN SODIUM 5 MG/1
5 TABLET ORAL
Status: COMPLETED | OUTPATIENT
Start: 2022-10-15 | End: 2022-10-15

## 2022-10-15 RX ORDER — LISINOPRIL 5 MG/1
10 TABLET ORAL EVERY 12 HOURS SCHEDULED
Status: DISCONTINUED | OUTPATIENT
Start: 2022-10-15 | End: 2022-10-15 | Stop reason: HOSPADM

## 2022-10-15 RX ORDER — ACETAMINOPHEN 325 MG/1
650 TABLET ORAL EVERY 4 HOURS PRN
Start: 2022-10-15 | End: 2022-10-21

## 2022-10-15 RX ORDER — LISINOPRIL 10 MG/1
10 TABLET ORAL EVERY 12 HOURS SCHEDULED
Qty: 180 TABLET | Refills: 3 | Status: SHIPPED | OUTPATIENT
Start: 2022-10-15 | End: 2022-10-25 | Stop reason: HOSPADM

## 2022-10-15 RX ORDER — WARFARIN SODIUM 5 MG/1
TABLET ORAL
Start: 2022-10-15 | End: 2022-10-21

## 2022-10-15 RX ORDER — ATORVASTATIN CALCIUM 80 MG/1
80 TABLET, FILM COATED ORAL DAILY
Qty: 30 TABLET | Refills: 1 | Status: SHIPPED | OUTPATIENT
Start: 2022-10-15

## 2022-10-15 RX ORDER — ENOXAPARIN SODIUM 100 MG/ML
60 INJECTION SUBCUTANEOUS EVERY 12 HOURS
Status: DISCONTINUED | OUTPATIENT
Start: 2022-10-15 | End: 2022-10-15 | Stop reason: HOSPADM

## 2022-10-15 RX ADMIN — ATORVASTATIN CALCIUM 10 MG: 10 TABLET, FILM COATED ORAL at 08:26

## 2022-10-15 RX ADMIN — Medication 3 ML: at 08:26

## 2022-10-15 RX ADMIN — ASPIRIN 81 MG: 81 TABLET, COATED ORAL at 08:26

## 2022-10-15 RX ADMIN — WARFARIN SODIUM 5 MG: 5 TABLET ORAL at 17:08

## 2022-10-15 RX ADMIN — PANTOPRAZOLE SODIUM 40 MG: 40 TABLET, DELAYED RELEASE ORAL at 06:00

## 2022-10-15 RX ADMIN — ATENOLOL 50 MG: 50 TABLET ORAL at 08:26

## 2022-10-15 NOTE — PROGRESS NOTES
"Pharmacy dosing service  Anticoagulant  Warfarin     Subjective:    Melody Hernandez is a 76 y.o.female being continued on warfarin for atrial fibrillation and valve replacement (mechanical aortic).    INR Goal: 2.5 - 3.5  Home medication?: Yes, warfarin 5 mg PO on Mon and warfarin 2.5 mg PO on all other days (last night's dose, - mg)  Bridge Therapy Present?:  Message out to Dr Lynn to ask if bridging warranted  Interacting Medications Evaluation (New/Present/Discontinued): none  Additional Contributing Factors: none      Assessment/Plan:    INR subtherapeutic today. Will give mini load dose of 5 mg x1 today and reassess INR tomorrow.    Continue to monitor and adjust based on INR.         Date 10/14 10/15          INR 2.29 2.17          Dose 2.5 mg 5 mg              Objective:  [Ht: 160 cm (63\"); Wt: 57.7 kg (127 lb 3.3 oz); BMI: Body mass index is 22.53 kg/m².]    Lab Results   Component Value Date    ALBUMIN 4.60 06/04/2020     Lab Results   Component Value Date    INR 2.17 10/14/2022    INR 2.29 10/14/2022    INR 2.7 10/03/2022    PROTIME 21.4 10/14/2022    PROTIME 22.5 10/14/2022     Lab Results   Component Value Date    HGB 12.8 10/14/2022    HGB 12.8 10/14/2022    HGB 13.8 06/04/2020     Lab Results   Component Value Date    HCT 39.9 10/14/2022    HCT 40.1 10/14/2022    HCT 41.4 06/04/2020       Criss Varner, PharmD  10/15/22 15:59 EDT   "

## 2022-10-15 NOTE — DISCHARGE SUMMARY
Date of Discharge:  10/15/2022    Discharge Diagnosis:   **CVA (cerebral vascular accident) (HCC) [I63.9]   Atrial fibrillation, chronic (HCC) [I48.20]   Long term (current) use of anticoagulants [Z79.01] [Z79.01]   Hypertension [I10]   History of mitral valve replacement [Z95.2]       Presenting Problem/History of Present Illness  Active Hospital Problems    Diagnosis  POA   • **CVA (cerebral vascular accident) (HCC) [I63.9]  Yes   • Atrial fibrillation, chronic (HCC) [I48.20]  Yes   • Long term (current) use of anticoagulants [Z79.01] [Z79.01]  Not Applicable   • Hypertension [I10]  Yes   • History of mitral valve replacement [Z95.2]  Not Applicable      Resolved Hospital Problems   No resolved problems to display.          Hospital Course  Patient is a 76 y.o. female presented with subacute presentation of left arm weakness and paresthesias with outpatient ct confirming cva.  Workup was unremarkable except for elevated blood pressures.  Lisinopril dose was increased to 20 mg daily.  Atorvastatin increased to 80 mg daily.  Atrial fib was controlled.  She is anticoagulated for atrial fib and mechanical valve.  She received an extra dose of warfarin for INR below goal of 2.5.  Pt is well-familiar with monitoring of INR at home and with assistance of anti-coagulation clinic.  At the time of discharge she has subjective decreased sensation in left hand and some weakness.  She will be set up for outpatient occupational therapy.     Procedures Performed         Consults:   Consults     Date and Time Order Name Status Description    10/15/2022 10:22 AM Inpatient Cardiology Consult Completed     10/14/2022  3:16 PM Inpatient Neurology Consult Stroke Completed           Pertinent Test Results:    Lab Results (most recent)     Procedure Component Value Units Date/Time    Basic Metabolic Panel [[id number]]  (Normal) Collected: 10/14/22 2318    Specimen: Blood Updated: 10/15/22 0003     Glucose 98 mg/dL      BUN 12 mg/dL       Creatinine 0.98 mg/dL      Sodium 140 mmol/L      Potassium 4.4 mmol/L      Chloride 103 mmol/L      CO2 26.0 mmol/L      Calcium 9.6 mg/dL      BUN/Creatinine Ratio 12.2     Anion Gap 11.0 mmol/L      eGFR 60.3 mL/min/1.73      Comment: National Kidney Foundation and American Society of Nephrology (ASN) Task Force recommended calculation based on the Chronic Kidney Disease Epidemiology Collaboration (CKD-EPI) equation refit without adjustment for race.       Narrative:      GFR Normal >60  Chronic Kidney Disease <60  Kidney Failure <15      Protime-INR [[id number]]  (Normal) Collected: 10/14/22 2318    Specimen: Blood Updated: 10/14/22 2353     Protime 21.4 Seconds      INR 2.17    CBC & Differential [[id number]]  (Abnormal) Collected: 10/14/22 2318    Specimen: Blood Updated: 10/14/22 2344    Narrative:      The following orders were created for panel order CBC & Differential.  Procedure                               Abnormality         Status                     ---------                               -----------         ------                     CBC Auto Differential[[id number]]        Abnormal            Final result                 Please view results for these tests on the individual orders.    CBC Auto Differential [[id number]]  (Abnormal) Collected: 10/14/22 2318    Specimen: Blood Updated: 10/14/22 2344     WBC 6.60 10*3/mm3      RBC 4.22 10*6/mm3      Hemoglobin 12.8 g/dL      Hematocrit 39.9 %      MCV 94.6 fL      MCH 30.3 pg      MCHC 32.0 g/dL      RDW 14.9 %      RDW-SD 49.0 fl      MPV 7.3 fL      Platelets 285 10*3/mm3      Neutrophil % 67.8 %      Lymphocyte % 18.1 %      Monocyte % 11.4 %      Eosinophil % 1.4 %      Basophil % 1.3 %      Neutrophils, Absolute 4.40 10*3/mm3      Lymphocytes, Absolute 1.20 10*3/mm3      Monocytes, Absolute 0.70 10*3/mm3      Eosinophils, Absolute 0.10 10*3/mm3      Basophils, Absolute 0.10 10*3/mm3      nRBC 0.1 /100 WBC     Extra Tubes [[id number]] Collected:  10/14/22 1431    Specimen: Blood, Venous Line Updated: 10/14/22 1531    Narrative:      The following orders were created for panel order Extra Tubes.  Procedure                               Abnormality         Status                     ---------                               -----------         ------                     [facility][[id number]]                                   Final result                 Please view results for these tests on the individual orders.    [facility] [[id number]] Collected: 10/14/22 1431    Specimen: Blood Updated: 10/14/22 1531     Extra Tube Hold for add-ons.     Comment: Auto resulted.       Basic Metabolic Panel [[id number]]  (Abnormal) Collected: 10/14/22 1431    Specimen: Blood from Arm, Left Updated: 10/14/22 1456     Glucose 113 mg/dL      BUN 11 mg/dL      Creatinine 0.98 mg/dL      Sodium 139 mmol/L      Potassium 4.3 mmol/L      Chloride 100 mmol/L      CO2 26.0 mmol/L      Calcium 9.8 mg/dL      BUN/Creatinine Ratio 11.2     Anion Gap 13.0 mmol/L      eGFR 60.3 mL/min/1.73      Comment: National Kidney Foundation and American Society of Nephrology (ASN) Task Force recommended calculation based on the Chronic Kidney Disease Epidemiology Collaboration (CKD-EPI) equation refit without adjustment for race.       Narrative:      GFR Normal >60  Chronic Kidney Disease <60  Kidney Failure <15      Protime-INR [[id number]]  (Normal) Collected: 10/14/22 1431    Specimen: Blood from Arm, Left Updated: 10/14/22 1451     Protime 22.5 Seconds      INR 2.29    CBC & Differential [[id number]]  (Normal) Collected: 10/14/22 1431    Specimen: Blood from Arm, Left Updated: 10/14/22 1437    Narrative:      The following orders were created for panel order CBC & Differential.  Procedure                               Abnormality         Status                     ---------                               -----------         ------                     CBC Auto Differential[[id number]]         Normal              Final result                 Please view results for these tests on the individual orders.    CBC Auto Differential [[id number]]  (Normal) Collected: 10/14/22 1431    Specimen: Blood from Arm, Left Updated: 10/14/22 1437     WBC 4.60 10*3/mm3      RBC 4.24 10*6/mm3      Hemoglobin 12.8 g/dL      Hematocrit 40.1 %      MCV 94.7 fL      MCH 30.2 pg      MCHC 31.8 g/dL      RDW 14.9 %      RDW-SD 49.0 fl      MPV 6.9 fL      Platelets 288 10*3/mm3      Neutrophil % 65.5 %      Lymphocyte % 20.6 %      Monocyte % 11.2 %      Eosinophil % 1.2 %      Basophil % 1.5 %      Neutrophils, Absolute 3.00 10*3/mm3      Lymphocytes, Absolute 0.90 10*3/mm3      Monocytes, Absolute 0.50 10*3/mm3      Eosinophils, Absolute 0.10 10*3/mm3      Basophils, Absolute 0.10 10*3/mm3      nRBC 0.0 /100 WBC            Results for orders placed during the hospital encounter of 08/23/22    Adult Transthoracic Echo Complete W/ Cont if Necessary Per Protocol    Interpretation Summary  · Estimated right ventricular systolic pressure from tricuspid regurgitation is normal (<35 mmHg).  · Estimated left ventricular EF = 60% Left ventricular systolic function is normal.    Indications  Chest pain    Technically satisfactory study.  Mechanical prosthetic mitral valve function is normal.  Tricuspid valve is structurally normal.  Mild tricuspid regurgitation is present.  Aortic valve is thickened with adequate opening motion.  Pulmonic valve could not be well visualized.  No evidence for mitral tricuspid or aortic regurgitation is seen by Doppler study.  Left atrium is enlarged.  Right atrium is normal in size.  Left ventricle is normal in size and contractility with ejection fraction of 60%.  Right ventricle is normal in size.  Atrial septum is intact.  Aorta is normal.  No pericardial effusion or intracardiac thrombus is seen.    Impression  Mechanical mitral prosthetic valve function is normal.  Mild tricuspid  regurgitation.  Thickened aortic valve with adequate opening motion.  Mild left atrial enlargement.  Normal left ventricular size and contractility with ejection fraction of 60%.              Condition on Discharge:  Stable    Vital Signs  Temp:  [97.6 °F (36.4 °C)-98.8 °F (37.1 °C)] 98.1 °F (36.7 °C)  Heart Rate:  [] 90  Resp:  [12-24] 12  BP: ()/() 174/96    Physical Exam:     General Appearance:    Alert, cooperative, in no acute distress   Head:    Normocephalic, without obvious abnormality, atraumatic   Eyes:            Lids and lashes normal, conjunctivae and sclerae normal, no   icterus, no pallor, corneas clear, PERRLA   Ears:    Ears appear intact with no abnormalities noted   Throat:   No oral lesions, no thrush, oral mucosa moist   Neck:   No adenopathy, supple, trachea midline, no thyromegaly, no   carotid bruit, no JVD   Lungs:     Clear to auscultation,respirations regular, even and                  unlabored    Heart:    Irregularly irregular; mitral valve click   Chest Wall:    No abnormalities observed   Abdomen:     Normal bowel sounds, no masses, no organomegaly, soft        non-tender, non-distended, no guarding, no rebound                tenderness   Extremities:   Moves all extremities well, no edema, no cyanosis, no             redness   Pulses:   Pulses palpable and equal bilaterally   Skin:   No bleeding, bruising or rash   Lymph nodes:   No palpable adenopathy   Neurologic:   5- strength left hand, otherwise normal       Discharge Disposition  Home or Self Care    Discharge Medications     Discharge Medications      New Medications      Instructions Start Date   acetaminophen 325 MG tablet  Commonly known as: TYLENOL   650 mg, Oral, Every 4 Hours PRN      warfarin 5 MG tablet  Commonly known as: COUMADIN   5 mg one day a week and 2.5 mg all other days.         Changes to Medications      Instructions Start Date   atorvastatin 80 MG tablet  Commonly known as: LIPITOR  What  changed:   · medication strength  · how much to take   80 mg, Oral, Daily      lisinopril 10 MG tablet  Commonly known as: PRINIVIL,ZESTRIL  What changed: when to take this   10 mg, Oral, Every 12 Hours Scheduled         Continue These Medications      Instructions Start Date   aspirin 81 MG EC tablet   Take 1 tablet by mouth Daily.      atenolol 100 MG tablet  Commonly known as: TENORMIN   50 mg, Oral, 2 Times Daily             Discharge Diet: Healthy heart    Activity at Discharge: No restrictions    Follow-up Appointments  Future Appointments   Date Time Provider Department Center   3/7/2023 10:40 AM Ahmet Lynn MD [facility]     Additional Instructions for the Follow-ups that You Need to Schedule     Discharge Follow-up with PCP   As directed       Currently Documented PCP:    Miguel Angel Mancera MD    PCP Phone Number:    [phone]     Follow Up Details: Dr. Mancera's office will contact you to schedule follow up appointment.         Discharge Follow-up with Specified Provider: Keep next regularly scheduled appointment with Dr. Lynn.   As directed      To: Keep next regularly scheduled appointment with Dr. Lynn.               Test Results Pending at Discharge       Sandy Diaz MD  10/15/22  16:24 EDT    Time: Discharge 25 min

## 2022-10-15 NOTE — CONSULTS
Referring Provider: Sandy Diaz MD  Reason for Consultation:  Status post mechanical mitral valve replacement  Atrial fibrillation  Anticoagulation management.  Possible stroke    Patient Care Team:  Miguel Angel Mancera MD as PCP - Ahmet Tabares MD as Consulting Physician (Cardiology)    Chief complaint  Left arm numbness    Subjective .     History of present illness:  Melody Hernandez is a 76 y.o. female who presents with history of multiple cardiac and noncardiac problems as outlined in the assessment was admitted with history of feeling numbness and tingling and difficulty grasping and holding with left hand.  Patient also had sensation of freezing in the brain and patient had CT scan of the head which was abnormal and patient was referred to the emergency room and subsequently was admitted to the hospital.  Patient does not have any cardiac symptoms.  No other associated aggravating or elevating factors.  Patient had mechanical mitral valve replacement several years ago and has chronic atrial fibrillation.  Patient has been on anticoagulation with recent INR of 2.1.  Cardiology consultation was requested..       ROS      Patient is not having the patient has been without any chest discomfort ,shortness of breath, palpitations, dizziness or syncope.  Denies having any headache ,abdominal pain ,nausea, vomiting , diarrhea constipation, loss of weight or loss of appetite.  Denies having any excessive bruising ,hematuria or blood in the stool.    Review of all systems negative except as indicated      History  Past Medical History:   Diagnosis Date   • Hyperlipidemia    • Mitral valve prolapse        Past Surgical History:   Procedure Laterality Date   • APPENDECTOMY     • CARDIOVERSION     • CHOLECYSTECTOMY     • MITRAL VALVE REPLACEMENT  05/20/2010    Mechanical    • TONSILLECTOMY         Family History   Problem Relation Age of Onset   • Heart disease Mother    • Heart attack Mother    • Hypertension  "Mother        Social History     Tobacco Use   • Smoking status: Former     Types: Cigarettes     Quit date: 2009     Years since quittin.7   • Smokeless tobacco: Never   Vaping Use   • Vaping Use: Never used   Substance Use Topics   • Alcohol use: Yes     Comment: social   • Drug use: Never        Medications Prior to Admission   Medication Sig Dispense Refill Last Dose   • aspirin (aspirin) 81 MG EC tablet Take 1 tablet by mouth Daily.   10/14/2022   • atenolol (TENORMIN) 100 MG tablet Take 50 mg by mouth 2 (Two) Times a Day.   10/14/2022   • atorvastatin (LIPITOR) 10 MG tablet Take 10 mg by mouth Daily.  3 10/14/2022   • lisinopril (PRINIVIL,ZESTRIL) 10 MG tablet Take 1 tablet by mouth Every Evening.            Ciprofloxacin and Codeine    Scheduled Meds:aspirin, 81 mg, Oral, Daily  atenolol, 50 mg, Oral, BID  atorvastatin, 10 mg, Oral, Daily  lisinopril, 10 mg, Oral, Q PM  pantoprazole, 40 mg, Oral, Q AM  sodium chloride, 3 mL, Intravenous, Q12H      Continuous Infusions:Pharmacy to dose warfarin,       PRN Meds:.•  acetaminophen  •  hydrALAZINE  •  melatonin  •  nitroglycerin  •  ondansetron **OR** ondansetron  •  Pharmacy to dose warfarin  •  [COMPLETED] Insert peripheral IV **AND** sodium chloride  •  sodium chloride    Objective     VITAL SIGNS  Vitals:    10/15/22 0550 10/15/22 0829 10/15/22 1005 10/15/22 1415   BP: (!) 87/52 (!) 166/105 (!) 155/105 174/96   BP Location: Right arm  Right arm Right arm   Patient Position: Lying  Sitting Sitting   Pulse: 68  68 90   Resp: 24  14 12   Temp: 98.6 °F (37 °C)  98.1 °F (36.7 °C)    TempSrc: Oral  Oral    SpO2:   97% 92%   Weight:       Height:           Flowsheet Rows    Flowsheet Row First Filed Value   Admission Height 160 cm (63\") Documented at 10/14/2022 1346   Admission Weight 57.6 kg (127 lb) Documented at 10/14/2022 1346            Intake/Output Summary (Last 24 hours) at 10/15/2022 1546  Last data filed at 10/15/2022 1005  Gross per 24 hour   Intake " 240 ml   Output --   Net 240 ml        TELEMETRY: Atrial fibrillation    Physical Exam:  The patient is alert, oriented and in no distress.  Vital signs as noted above.  Head and neck revealed no carotid bruits or jugular venous distention.  No thyromegaly or lymph adenopathy is present  Lungs clear.  No wheezing.  Breath sounds are normal bilaterally.  Heart normal and crisp heart sounds.No murmur.  No precordial rub is present.  No gallop is present.  Abdomen soft and nontender.  No organomegaly is present.  Extremities with good peripheral pulses without any pedal edema.  Skin warm and dry.  Musculoskeletal system is grossly normal  CNS grossly normal      Results Review:   I reviewed the patient's new clinical results.  Lab Results (last 24 hours)     Procedure Component Value Units Date/Time    Basic Metabolic Panel [[id number]]  (Normal) Collected: 10/14/22 2318    Specimen: Blood Updated: 10/15/22 0003     Glucose 98 mg/dL      BUN 12 mg/dL      Creatinine 0.98 mg/dL      Sodium 140 mmol/L      Potassium 4.4 mmol/L      Chloride 103 mmol/L      CO2 26.0 mmol/L      Calcium 9.6 mg/dL      BUN/Creatinine Ratio 12.2     Anion Gap 11.0 mmol/L      eGFR 60.3 mL/min/1.73      Comment: National Kidney Foundation and American Society of Nephrology (ASN) Task Force recommended calculation based on the Chronic Kidney Disease Epidemiology Collaboration (CKD-EPI) equation refit without adjustment for race.       Narrative:      GFR Normal >60  Chronic Kidney Disease <60  Kidney Failure <15      Protime-INR [[id number]]  (Normal) Collected: 10/14/22 2318    Specimen: Blood Updated: 10/14/22 2353     Protime 21.4 Seconds      INR 2.17    CBC & Differential [[id number]]  (Abnormal) Collected: 10/14/22 2318    Specimen: Blood Updated: 10/14/22 6224    Narrative:      The following orders were created for panel order CBC & Differential.  Procedure                               Abnormality         Status                      ---------                               -----------         ------                     CBC Auto Differential[[id number]]        Abnormal            Final result                 Please view results for these tests on the individual orders.    CBC Auto Differential [[id number]]  (Abnormal) Collected: 10/14/22 2318    Specimen: Blood Updated: 10/14/22 2344     WBC 6.60 10*3/mm3      RBC 4.22 10*6/mm3      Hemoglobin 12.8 g/dL      Hematocrit 39.9 %      MCV 94.6 fL      MCH 30.3 pg      MCHC 32.0 g/dL      RDW 14.9 %      RDW-SD 49.0 fl      MPV 7.3 fL      Platelets 285 10*3/mm3      Neutrophil % 67.8 %      Lymphocyte % 18.1 %      Monocyte % 11.4 %      Eosinophil % 1.4 %      Basophil % 1.3 %      Neutrophils, Absolute 4.40 10*3/mm3      Lymphocytes, Absolute 1.20 10*3/mm3      Monocytes, Absolute 0.70 10*3/mm3      Eosinophils, Absolute 0.10 10*3/mm3      Basophils, Absolute 0.10 10*3/mm3      nRBC 0.1 /100 WBC           Imaging Results (Last 24 Hours)     Procedure Component Value Units Date/Time    CT Angiogram Carotids [[id number]] Collected: 10/14/22 2320     Updated: 10/14/22 2330    Narrative:         DATE OF EXAM:  10/14/2022 6:55 PM     PROCEDURE:  CT ANGIOGRAM CAROTIDS-     INDICATIONS:   Stroke, follow up; I63.9-Cerebral infarction, unspecified     COMPARISON:   No Comparisons Available     TECHNIQUE:  CTA of the head and CTA of the neck was performed after the intravenous  administration of Isovue 370. Reconstructed coronal and sagittal images  were also obtained. In addition, a 3 D volume rendered image was  obtained after post processing. Automated exposure control and iterative  reconstruction methods were used. AI analysis of LVO was utilized for  the CTA Head imaging portion of the study.      FINDINGS:  VASCULAR FINDINGS: No evidence of significant stenosis or occlusion or  thrombus in the right or left vertebral arteries or right or left common  carotid arteries or right or left internal carotid  arteries. Calcified  atherosclerotic plaque causes approximately 50%-75% stenosis in the  proximal left subclavian artery proximal to the origin of the left  vertebral artery     NONVASCULAR FINDINGS: No evidence of mass or adenopathy in the right or  left side of the neck.          Impression:      No evidence of occlusion or significant stenosis or thrombus in the  right or left vertebral arteries or common carotid arteries or internal  carotid arteries.  2. Calcified atherosclerotic plaque causes approximately 50%-75%  stenosis in the proximal left subclavian artery proximal to the origin  of the left vertebral artery     Electronically Signed By-Nikolai Dai MD On:10/14/2022 11:28 PM  This report was finalized on [id number] by  Nikolai Dai MD.    CT Angiogram Head [[id number]] Collected: 10/14/22 2315     Updated: 10/14/22 2321    Narrative:         DATE OF EXAM:  10/14/2022 6:55 PM     PROCEDURE:  CT ANGIOGRAM HEAD-     INDICATIONS:   Stroke, follow up; I63.9-Cerebral infarction, unspecified     COMPARISON:    No Comparisons Available     TECHNIQUE:  CTA of the head was performed after the intravenous administration of  Isovue 370. Reconstructed coronal and sagittal images were also  obtained. In addition, a 3 D volume rendered image was obtained after  post processing. Automated exposure control and iterative reconstruction  methods were used     FINDINGS:  VASCULAR FINDINGS: No evidence of occlusion or stenosis or thrombus or  embolus or aneurysm in the arteries the brain or [place]      NONVASCULAR FINDINGS: No evidence of intracranial hemorrhage or midline  shift. No evidence of mass or metastatic disease in length. No evidence  of acute infarction or cerebral edema. No evidence of abnormal contrast  enhancement in the brain       Impression:         1. CT angiogram of the arteries the brain and [place] in the  brain is normal  2. No acute intracranial abnormality  seen.     Electronically Signed By-Nikolai Dai MD On:10/14/2022 11:19 PM  This report was finalized on [id number] by  Nikolai Dai MD.      LAB RESULTS (LAST 7 DAYS)    CBC  Results from last 7 days   Lab Units 10/14/22  2318 10/14/22  1431   WBC 10*3/mm3 6.60 4.60   RBC 10*6/mm3 4.22 4.24   HEMOGLOBIN g/dL 12.8 12.8   HEMATOCRIT % 39.9 40.1   MCV fL 94.6 94.7   PLATELETS 10*3/mm3 285 288       BMP  Results from last 7 days   Lab Units 10/14/22  2318 10/14/22  1431   SODIUM mmol/L 140 139   POTASSIUM mmol/L 4.4 4.3   CHLORIDE mmol/L 103 100   CO2 mmol/L 26.0 26.0   BUN mg/dL 12 11   CREATININE mg/dL 0.98 0.98   GLUCOSE mg/dL 98 113*       CMP   Results from last 7 days   Lab Units 10/14/22  2318 10/14/22  1431   SODIUM mmol/L 140 139   POTASSIUM mmol/L 4.4 4.3   CHLORIDE mmol/L 103 100   CO2 mmol/L 26.0 26.0   BUN mg/dL 12 11   CREATININE mg/dL 0.98 0.98   GLUCOSE mg/dL 98 113*         BNP        TROPONIN        CoAg  Results from last 7 days   Lab Units 10/14/22  2318 10/14/22  1431   INR  2.17 2.29       Creatinine Clearance  Estimated Creatinine Clearance: 45.2 mL/min (by C-G formula based on SCr of 0.98 mg/dL).    ABG        Radiology  CT Angiogram Carotids    Result Date: 10/14/2022  No evidence of occlusion or significant stenosis or thrombus in the right or left vertebral arteries or common carotid arteries or internal carotid arteries. 2. Calcified atherosclerotic plaque causes approximately 50%-75% stenosis in the proximal left subclavian artery proximal to the origin of the left vertebral artery  Electronically Signed By-Nikolai Dai MD On:10/14/2022 11:28 PM This report was finalized on [id number] by  Nikolai Dai MD.    CT Angiogram Head    Result Date: 10/14/2022   1. CT angiogram of the arteries the brain and [place] in the brain is normal 2. No acute intracranial abnormality seen.  Electronically Signed By-Nikolai Dai MD On:10/14/2022 11:19 PM  This report was finalized on [id number] by  Nikolai Dai MD.        EKG            I personally viewed and interpreted the patient's EKG/Telemetry data: Atrial fibrillation  ECHOCARDIOGRAM:    Results for orders placed during the hospital encounter of 08/23/22    Adult Transthoracic Echo Complete W/ Cont if Necessary Per Protocol    Interpretation Summary  · Estimated right ventricular systolic pressure from tricuspid regurgitation is normal (<35 mmHg).  · Estimated left ventricular EF = 60% Left ventricular systolic function is normal.    Indications  Chest pain    Technically satisfactory study.  Mechanical prosthetic mitral valve function is normal.  Tricuspid valve is structurally normal.  Mild tricuspid regurgitation is present.  Aortic valve is thickened with adequate opening motion.  Pulmonic valve could not be well visualized.  No evidence for mitral tricuspid or aortic regurgitation is seen by Doppler study.  Left atrium is enlarged.  Right atrium is normal in size.  Left ventricle is normal in size and contractility with ejection fraction of 60%.  Right ventricle is normal in size.  Atrial septum is intact.  Aorta is normal.  No pericardial effusion or intracardiac thrombus is seen.    Impression  Mechanical mitral prosthetic valve function is normal.  Mild tricuspid regurgitation.  Thickened aortic valve with adequate opening motion.  Mild left atrial enlargement.  Normal left ventricular size and contractility with ejection fraction of 60%.              Cardiolite (Tc-99m Sestamibi) stress test      OTHER:     Assessment & Plan     Principal Problem:    CVA (cerebral vascular accident) ([facility])      Assessment and Plan         ///////////////////  Impression  ==============  -Left arm tingling and numbness and difficulty grasping pressure improved.  Clinically patient has right higher parietal small stroke.  CT angiogram was negative.  MRI could not be performed due to mechanical mitral valve  replacement     - Status post mitral valve replacement with [place] mechanical heart valve and pulmonary vein isolation for atrial fibrillation May 2010 (patient had history of significant mitral valve prolapse and mitral regurgitation).     -   Persistent and chronic atrial fibrillation . Status post electrical cardioversion 05/24/2013 and 05/13/2016.   Patient is in atrial fibrillation today.     -moderate tricuspid regurgitation     - left bundle-branch block     Echocardiogram-8/23/2022 revealed mild tricuspid regurgitation normally functioning mitral prosthetic valve and normal left ventricle function.  Left atrial enlargement.  Lexiscan test-normal- 8/23/2022     Lexiscan Cardiolite test-normal 6/25/2020  Echocardiogram 6/25/2020 revealed biatrial enlargement structurally and functionally normal mitral prosthetic valve moderate tricuspid regurgitation.  Left ventricle ejection fraction is 60%.-  Exertional shortness of breath and fatigue  -stable and improved     Lexiscan Cardiolite test is negative for myocardial ischemia 10/25/2016.  Echocardiogram 10/25/2016 revealed normally functioning prosthetic mitral valve biatrial enlargement moderate mitral regurgitation and thickened aortic valve.     - Dyslipidemia and hypertension      - Smoker      - Family history of coronary artery disease  .   - allergy to Cipro and codeine     -history of intolerance to amiodarone.  ============  Plan  ================  Left arm tingling and numbness and difficulty grasping pressure improved.  Clinically patient has right higher parietal small stroke.  CT angiogram was negative.  MRI could not be performed due to mechanical mitral valve replacement    Recent shortness of breath with exertion with activities such as going to the mailbox etc.-improved  Echocardiogram-as above  Stress Cardiolite test-as above    Status post mitral valve replacement  EKG showed atrial fibrillation with controlled ventricular  response     Anticoagulation status reviewed.  Home monitoring.  INR data was reviewed.  Continue Coumadin.  INR- 2.1  SpineChronic atrial fibrillation-rate is well controlled.  EKG showed atrial fibrillation with controlled ventricular response.  8/8/2022     Dyslipidemia-continue atorvastatin     Hypertension- 170/70.  Increase metoprolol to 10 mg twice a day instead of once a day.    Medications were reviewed and updated.  Continue atenolol 50 mg twice a day and lisinopril 10 mg for blood pressure more than 150.  Patient is off Lanoxin.    Continue Coumadin and baby aspirin.  Continue Lipitor     No need for repeat echocardiogram.  No further cardiac work-up is planned at this time.    Further plan will depend on patient's progress.  [[[[[[[[[[[[[[[[[[[[[[[[[          Ahmet Lnyn MD  10/15/22  15:46 EDT

## 2022-10-15 NOTE — PLAN OF CARE
Goal Outcome Evaluation: pt a/ox4, sitting up in bed, per cardiac pt can be discharged, dr mccoy notified and awaiting response. Denies pain or needs, call light in reach

## 2022-10-15 NOTE — CONSULTS
Primary Care Provider: Miguel Angel Mancera MD     Consult requested by: ZACKERY Sandoval    Reason for Consultation: Neurological evaluation for stroke.     Melody Hernandez is a 76 y.o. female *    History taken from: patient chart RN    Chief complaint: Evaluate for possible stroke.        SUBJECTIVE:    History of present illness: The patient is a 76 year old lady with multiple medical problems including s/p mitral valve replacement/ on anti coagulation for chronic atrial fibrillation, HLD who presented to ER of [facility] secondary to onset of feeling of numbness, tingling and difficulty in grasping and holding objects in the left hand.  Sometimes she has a feeling it may not be her hand.  She has difficulty in holding objects in left hand with a fear of dropping them.  She presented to ER of [facility] and admitted for further work up.  She states that her symptoms have improved.   She denies any focal weakness, bowel and bladder problems, gait disturbance.      Review of Systems   Constitutional: Negative  HENT: Negative.    Eyes: Negative.    Respiratory: Negative.    Cardiovascular: s/p mitral valve replacement surgery.    Gastrointestinal: Negative.    Genitourinary: Negative.    Musculoskeletal: Negative  Skin: Negative.    Neurological: ?stroke.    Hematological: Negative.    Psychiatric/Behavioral: Negative.        PATIENT HISTORY:  Past Medical History:   Diagnosis Date   • Hyperlipidemia    • Mitral valve prolapse    ,   Past Surgical History:   Procedure Laterality Date   • APPENDECTOMY     • CARDIOVERSION     • CHOLECYSTECTOMY     • MITRAL VALVE REPLACEMENT  2010    Mechanical    • TONSILLECTOMY     ,   Family History   Problem Relation Age of Onset   • Heart disease Mother    • Heart attack Mother    • Hypertension Mother    ,   Social History     Tobacco Use   • Smoking status: Former     Types: Cigarettes     Quit date:      Years since quittin.7   • Smokeless tobacco: Never   Vaping Use   •  Vaping Use: Never used   Substance Use Topics   • Alcohol use: Yes     Comment: social   • Drug use: Never   ,   Medications Prior to Admission   Medication Sig Dispense Refill Last Dose   • aspirin (aspirin) 81 MG EC tablet Take 1 tablet by mouth Daily.   10/14/2022   • atenolol (TENORMIN) 100 MG tablet Take 50 mg by mouth 2 (Two) Times a Day.   10/14/2022   • atorvastatin (LIPITOR) 10 MG tablet Take 10 mg by mouth Daily.  3 10/14/2022   • lisinopril (PRINIVIL,ZESTRIL) 10 MG tablet Take 1 tablet by mouth Every Evening.      , Scheduled Meds:  aspirin, 81 mg, Oral, Daily  atenolol, 50 mg, Oral, BID  atorvastatin, 10 mg, Oral, Daily  lisinopril, 10 mg, Oral, Q PM  pantoprazole, 40 mg, Oral, Q AM  sodium chloride, 3 mL, Intravenous, Q12H    , Continuous Infusions:  Pharmacy to dose warfarin,     , PRN Meds:  •  acetaminophen  •  hydrALAZINE  •  melatonin  •  nitroglycerin  •  ondansetron **OR** ondansetron  •  Pharmacy to dose warfarin  •  [COMPLETED] Insert peripheral IV **AND** sodium chloride  •  sodium chloride, Allergies:  Ciprofloxacin and Codeine    ________________________________________________________        OBJECTIVE:  Upon today's exam, The patient is sitting in chair in no apparent distress.  Head NC, AT, Neck supple, trachea midline.  Lungs CTA,  CV  S1-S2.  Abdomen soft, non tender. Ext no edema, no cyanosis.          Neurologic Exam  The patient is awake, alert, oriented to person, place and time.  Speech is fluent with good comprehension.  Follow commands.  CN VFFC, EOMI, no facial droop. Tongue midline.  Motor strength 5/5.  Left hand  5/5. Sensory altered sensation in the left hand to light touch.  Reflexes +, plantar mute.  Cerebellum finger to nose intact.   ________________________________________________________   RESULTS REVIEW:    VITAL SIGNS:   Temp:  [97.4 °F (36.3 °C)-98.8 °F (37.1 °C)] 98.1 °F (36.7 °C)  Heart Rate:  [] 68  Resp:  [14-24] 14  BP: ()/() 155/105      LABS:  WBC   Date Value Ref Range Status   10/14/2022 6.60 3.40 - 10.80 10*3/mm3 Final     RBC   Date Value Ref Range Status   10/14/2022 4.22 3.77 - 5.28 10*6/mm3 Final     Hemoglobin   Date Value Ref Range Status   10/14/2022 12.8 12.0 - 15.9 g/dL Final     Hematocrit   Date Value Ref Range Status   10/14/2022 39.9 34.0 - 46.6 % Final     MCV   Date Value Ref Range Status   10/14/2022 94.6 79.0 - 97.0 fL Final     MCH   Date Value Ref Range Status   10/14/2022 30.3 26.6 - 33.0 pg Final     MCHC   Date Value Ref Range Status   10/14/2022 32.0 31.5 - 35.7 g/dL Final     RDW   Date Value Ref Range Status   10/14/2022 14.9 12.3 - 15.4 % Final     RDW-SD   Date Value Ref Range Status   10/14/2022 49.0 37.0 - 54.0 fl Final     MPV   Date Value Ref Range Status   10/14/2022 7.3 6.0 - 12.0 fL Final     Platelets   Date Value Ref Range Status   10/14/2022 285 140 - 450 10*3/mm3 Final     Neutrophil %   Date Value Ref Range Status   10/14/2022 67.8 42.7 - 76.0 % Final     Lymphocyte %   Date Value Ref Range Status   10/14/2022 18.1 (L) 19.6 - 45.3 % Final     Monocyte %   Date Value Ref Range Status   10/14/2022 11.4 5.0 - 12.0 % Final     Eosinophil %   Date Value Ref Range Status   10/14/2022 1.4 0.3 - 6.2 % Final     Basophil %   Date Value Ref Range Status   10/14/2022 1.3 0.0 - 1.5 % Final     Neutrophils, Absolute   Date Value Ref Range Status   10/14/2022 4.40 1.70 - 7.00 10*3/mm3 Final     Lymphocytes, Absolute   Date Value Ref Range Status   10/14/2022 1.20 0.70 - 3.10 10*3/mm3 Final     Monocytes, Absolute   Date Value Ref Range Status   10/14/2022 0.70 0.10 - 0.90 10*3/mm3 Final     Eosinophils, Absolute   Date Value Ref Range Status   10/14/2022 0.10 0.00 - 0.40 10*3/mm3 Final     Basophils, Absolute   Date Value Ref Range Status   10/14/2022 0.10 0.00 - 0.20 10*3/mm3 Final     nRBC   Date Value Ref Range Status   10/14/2022 0.1 0.0 - 0.2 /100 WBC Final     Glucose   Date Value Ref Range Status   10/14/2022  98 65 - 99 mg/dL Final     BUN   Date Value Ref Range Status   10/14/2022 12 8 - 23 mg/dL Final     Creatinine   Date Value Ref Range Status   10/14/2022 0.98 0.57 - 1.00 mg/dL Final     Sodium   Date Value Ref Range Status   10/14/2022 140 136 - 145 mmol/L Final     Potassium   Date Value Ref Range Status   10/14/2022 4.4 3.5 - 5.2 mmol/L Final     Chloride   Date Value Ref Range Status   10/14/2022 103 98 - 107 mmol/L Final     CO2   Date Value Ref Range Status   10/14/2022 26.0 22.0 - 29.0 mmol/L Final     Calcium   Date Value Ref Range Status   10/14/2022 9.6 8.6 - 10.5 mg/dL Final     BUN/Creatinine Ratio   Date Value Ref Range Status   10/14/2022 12.2 7.0 - 25.0 Final     Anion Gap   Date Value Ref Range Status   10/14/2022 11.0 5.0 - 15.0 mmol/L Final       Lab Results   Component Value Date    TSH 3.170 06/04/2020    LDL 94 10/22/2018         IMAGING STUDIES:  CT Angiogram Carotids    Result Date: 10/14/2022  No evidence of occlusion or significant stenosis or thrombus in the right or left vertebral arteries or common carotid arteries or internal carotid arteries. 2. Calcified atherosclerotic plaque causes approximately 50%-75% stenosis in the proximal left subclavian artery proximal to the origin of the left vertebral artery  Electronically Signed By-Nikolai Dai MD On:10/14/2022 11:28 PM This report was finalized on [id number] by  Nikolai Dai MD.    CT Angiogram Head    Result Date: 10/14/2022   1. CT angiogram of the arteries the brain and [place] in the brain is normal 2. No acute intracranial abnormality seen.  Electronically Signed By-Nikolai Dai MD On:10/14/2022 11:19 PM This report was finalized on [id number] by  Nikolai Dai MD.      I reviewed the patient's new clinical results.      ________________________________________________________     PROBLEM LIST:    CVA (cerebral vascular accident) ([facility])          Assessment & Plan   ASSESSMENT/PLAN:  The  patient is a 76 year old lady with multiple medical problems including s/p mitral valve replacement on Coumadin, HLD, a-fib who had developed left hand sensory paresthesias and change in perception.  She has clinical features suggestive of the right higher parietal small stroke.  Unfortunately she is unable to have MRI of Brain done to incompatible mitral valve to MRI.   Rec:  Will obtain 2-D Echocardiogram.  Blood work up for Vit B12, TSH, Lipid profile and HbA1C.  Will continue ASA and Coumadin.  Will follow.   HLD, a-fib, as per hospitalist, MD.     Modification of stroke risk factors:   - Blood pressure should be less than 130/80 outpatient, HbA1c less than 6.5, LDL less than 70; b12>500 and smoking cessation if applicable. We would be grateful if the primary team / primary care physician would keep a close watch on the above targets.  - Stroke education  - Follow up with neurologist of choice      I discussed the patient's findings and my recommendations with patient and nursing staff    Lalita Cowart MD  10/15/22  12:08 EDT

## 2022-10-16 LAB — QT INTERVAL: 397 MS

## 2022-10-16 NOTE — OUTREACH NOTE
Prep Survey    Flowsheet Row Responses   [facility] facility patient discharged from? [facility]   Is LACE score < 7 ? Yes   Emergency Room discharge w/ pulse ox? No   Eligibility Readm Mgmt   Discharge diagnosis CVA   Does the patient have one of the following disease processes/diagnoses(primary or secondary)? Stroke   Does the patient have Home health ordered? No   Is there a DME ordered? No   Medication alerts for this patient see AVS   Prep survey completed? Yes          AGA URBINA - Registered Nurse

## 2022-10-17 NOTE — CASE MANAGEMENT/SOCIAL WORK
Case Management Discharge Note      Final Note: Home       Transportation Services  Private: Car    Final Discharge Disposition Code: 01 - home or self-care   Type Of Destruction Used: Curettage

## 2022-10-20 ENCOUNTER — READMISSION MANAGEMENT (OUTPATIENT)
Dept: CALL CENTER | Facility: HOSPITAL | Age: 77
End: 2022-10-20

## 2022-10-21 ENCOUNTER — APPOINTMENT (OUTPATIENT)
Dept: CT IMAGING | Facility: HOSPITAL | Age: 77
End: 2022-10-21

## 2022-10-21 ENCOUNTER — ANTICOAGULATION VISIT (OUTPATIENT)
Dept: CARDIOLOGY | Facility: CLINIC | Age: 77
End: 2022-10-21

## 2022-10-21 ENCOUNTER — TELEPHONE (OUTPATIENT)
Dept: CARDIOLOGY | Facility: CLINIC | Age: 77
End: 2022-10-21

## 2022-10-21 ENCOUNTER — HOSPITAL ENCOUNTER (INPATIENT)
Facility: HOSPITAL | Age: 77
LOS: 2 days | Discharge: HOME | End: 2022-10-25
Attending: EMERGENCY MEDICINE | Admitting: INTERNAL MEDICINE

## 2022-10-21 DIAGNOSIS — Z79.01 LONG TERM (CURRENT) USE OF ANTICOAGULANTS: Primary | ICD-10-CM

## 2022-10-21 DIAGNOSIS — R47.01 MILD APHASIA: Primary | ICD-10-CM

## 2022-10-21 LAB
ALBUMIN SERPL-MCNC: 4.6 G/DL (ref 3.5–5.2)
ALBUMIN/GLOB SERPL: 1.4 G/DL
ALP SERPL-CCNC: 83 U/L (ref 39–117)
ALT SERPL W P-5'-P-CCNC: 16 U/L (ref 1–33)
ANION GAP SERPL CALCULATED.3IONS-SCNC: 13 MMOL/L (ref 5–15)
APTT PPP: 34.8 SECONDS (ref 61–76.5)
AST SERPL-CCNC: 34 U/L (ref 1–32)
BASOPHILS # BLD AUTO: 0.1 10*3/MM3 (ref 0–0.2)
BASOPHILS NFR BLD AUTO: 1.7 % (ref 0–1.5)
BILIRUB SERPL-MCNC: 2.1 MG/DL (ref 0–1.2)
BUN SERPL-MCNC: 14 MG/DL (ref 8–23)
BUN/CREAT SERPL: 12.5 (ref 7–25)
CALCIUM SPEC-SCNC: 9.6 MG/DL (ref 8.6–10.5)
CHLORIDE SERPL-SCNC: 102 MMOL/L (ref 98–107)
CO2 SERPL-SCNC: 26 MMOL/L (ref 22–29)
CREAT SERPL-MCNC: 1.12 MG/DL (ref 0.57–1)
DEPRECATED RDW RBC AUTO: 48.6 FL (ref 37–54)
EGFRCR SERPLBLD CKD-EPI 2021: 51.4 ML/MIN/1.73
EOSINOPHIL # BLD AUTO: 0.1 10*3/MM3 (ref 0–0.4)
EOSINOPHIL NFR BLD AUTO: 1.5 % (ref 0.3–6.2)
ERYTHROCYTE [DISTWIDTH] IN BLOOD BY AUTOMATED COUNT: 14.2 % (ref 12.3–15.4)
GLOBULIN UR ELPH-MCNC: 3.2 GM/DL
GLUCOSE BLDC GLUCOMTR-MCNC: 80 MG/DL (ref 70–105)
GLUCOSE SERPL-MCNC: 84 MG/DL (ref 65–99)
HCT VFR BLD AUTO: 43.3 % (ref 34–46.6)
HGB BLD-MCNC: 14.4 G/DL (ref 12–15.9)
INR PPP: 2.26 (ref 0.93–1.1)
INR PPP: 2.4
LYMPHOCYTES # BLD AUTO: 1.1 10*3/MM3 (ref 0.7–3.1)
LYMPHOCYTES NFR BLD AUTO: 20.4 % (ref 19.6–45.3)
MCH RBC QN AUTO: 30.9 PG (ref 26.6–33)
MCHC RBC AUTO-ENTMCNC: 33.3 G/DL (ref 31.5–35.7)
MCV RBC AUTO: 92.7 FL (ref 79–97)
MONOCYTES # BLD AUTO: 0.6 10*3/MM3 (ref 0.1–0.9)
MONOCYTES NFR BLD AUTO: 11.1 % (ref 5–12)
NEUTROPHILS NFR BLD AUTO: 3.4 10*3/MM3 (ref 1.7–7)
NEUTROPHILS NFR BLD AUTO: 65.3 % (ref 42.7–76)
NRBC BLD AUTO-RTO: 0.1 /100 WBC (ref 0–0.2)
PLATELET # BLD AUTO: 307 10*3/MM3 (ref 140–450)
PMV BLD AUTO: 7.5 FL (ref 6–12)
POTASSIUM SERPL-SCNC: 4.5 MMOL/L (ref 3.5–5.2)
PROT SERPL-MCNC: 7.8 G/DL (ref 6–8.5)
PROTHROMBIN TIME: 22.2 SECONDS (ref 9.6–11.7)
QT INTERVAL: 417 MS
RBC # BLD AUTO: 4.67 10*6/MM3 (ref 3.77–5.28)
SODIUM SERPL-SCNC: 141 MMOL/L (ref 136–145)
TROPONIN T SERPL-MCNC: <0.01 NG/ML (ref 0–0.03)
WBC NRBC COR # BLD: 5.3 10*3/MM3 (ref 3.4–10.8)

## 2022-10-21 PROCEDURE — 70450 CT HEAD/BRAIN W/O DYE: CPT

## 2022-10-21 PROCEDURE — 82962 GLUCOSE BLOOD TEST: CPT

## 2022-10-21 PROCEDURE — 85730 THROMBOPLASTIN TIME PARTIAL: CPT | Performed by: EMERGENCY MEDICINE

## 2022-10-21 PROCEDURE — G0378 HOSPITAL OBSERVATION PER HR: HCPCS

## 2022-10-21 PROCEDURE — 85610 PROTHROMBIN TIME: CPT | Performed by: EMERGENCY MEDICINE

## 2022-10-21 PROCEDURE — 85025 COMPLETE CBC W/AUTO DIFF WBC: CPT | Performed by: EMERGENCY MEDICINE

## 2022-10-21 PROCEDURE — 99285 EMERGENCY DEPT VISIT HI MDM: CPT

## 2022-10-21 PROCEDURE — 93005 ELECTROCARDIOGRAM TRACING: CPT | Performed by: EMERGENCY MEDICINE

## 2022-10-21 PROCEDURE — 83036 HEMOGLOBIN GLYCOSYLATED A1C: CPT | Performed by: NURSE PRACTITIONER

## 2022-10-21 PROCEDURE — 80053 COMPREHEN METABOLIC PANEL: CPT | Performed by: EMERGENCY MEDICINE

## 2022-10-21 PROCEDURE — 25010000002 ENOXAPARIN PER 10 MG: Performed by: INTERNAL MEDICINE

## 2022-10-21 PROCEDURE — 84484 ASSAY OF TROPONIN QUANT: CPT | Performed by: EMERGENCY MEDICINE

## 2022-10-21 PROCEDURE — 99284 EMERGENCY DEPT VISIT MOD MDM: CPT

## 2022-10-21 RX ORDER — WARFARIN SODIUM 5 MG/1
2.5 TABLET ORAL SEE ADMIN INSTRUCTIONS
COMMUNITY
End: 2022-10-25 | Stop reason: HOSPADM

## 2022-10-21 RX ORDER — NITROGLYCERIN 0.4 MG/1
0.4 TABLET SUBLINGUAL
Status: DISCONTINUED | OUTPATIENT
Start: 2022-10-21 | End: 2022-10-25 | Stop reason: HOSPADM

## 2022-10-21 RX ORDER — ATORVASTATIN CALCIUM 40 MG/1
80 TABLET, FILM COATED ORAL DAILY
Status: DISCONTINUED | OUTPATIENT
Start: 2022-10-22 | End: 2022-10-25 | Stop reason: HOSPADM

## 2022-10-21 RX ORDER — FAMOTIDINE 20 MG/1
40 TABLET, FILM COATED ORAL DAILY
Status: DISCONTINUED | OUTPATIENT
Start: 2022-10-22 | End: 2022-10-25 | Stop reason: HOSPADM

## 2022-10-21 RX ORDER — ATENOLOL 50 MG/1
50 TABLET ORAL 2 TIMES DAILY
Status: DISCONTINUED | OUTPATIENT
Start: 2022-10-21 | End: 2022-10-25 | Stop reason: HOSPADM

## 2022-10-21 RX ORDER — SODIUM CHLORIDE 0.9 % (FLUSH) 0.9 %
3-10 SYRINGE (ML) INJECTION AS NEEDED
Status: DISCONTINUED | OUTPATIENT
Start: 2022-10-21 | End: 2022-10-25 | Stop reason: HOSPADM

## 2022-10-21 RX ORDER — ONDANSETRON 2 MG/ML
4 INJECTION INTRAMUSCULAR; INTRAVENOUS EVERY 6 HOURS PRN
Status: DISCONTINUED | OUTPATIENT
Start: 2022-10-21 | End: 2022-10-25 | Stop reason: HOSPADM

## 2022-10-21 RX ORDER — ENOXAPARIN SODIUM 100 MG/ML
60 INJECTION SUBCUTANEOUS EVERY 12 HOURS SCHEDULED
Status: DISCONTINUED | OUTPATIENT
Start: 2022-10-21 | End: 2022-10-25 | Stop reason: HOSPADM

## 2022-10-21 RX ORDER — LOSARTAN POTASSIUM 50 MG/1
50 TABLET ORAL
Status: DISCONTINUED | OUTPATIENT
Start: 2022-10-22 | End: 2022-10-25 | Stop reason: HOSPADM

## 2022-10-21 RX ORDER — ACETAMINOPHEN 325 MG/1
650 TABLET ORAL EVERY 4 HOURS PRN
Status: DISCONTINUED | OUTPATIENT
Start: 2022-10-21 | End: 2022-10-25 | Stop reason: HOSPADM

## 2022-10-21 RX ORDER — SODIUM CHLORIDE 0.9 % (FLUSH) 0.9 %
10 SYRINGE (ML) INJECTION AS NEEDED
Status: DISCONTINUED | OUTPATIENT
Start: 2022-10-21 | End: 2022-10-25 | Stop reason: HOSPADM

## 2022-10-21 RX ORDER — CHOLECALCIFEROL (VITAMIN D3) 125 MCG
5 CAPSULE ORAL NIGHTLY PRN
Status: DISCONTINUED | OUTPATIENT
Start: 2022-10-21 | End: 2022-10-25 | Stop reason: HOSPADM

## 2022-10-21 RX ORDER — HYDRALAZINE HYDROCHLORIDE 20 MG/ML
10 INJECTION INTRAMUSCULAR; INTRAVENOUS EVERY 6 HOURS PRN
Status: DISCONTINUED | OUTPATIENT
Start: 2022-10-21 | End: 2022-10-25 | Stop reason: HOSPADM

## 2022-10-21 RX ORDER — WARFARIN SODIUM 5 MG/1
5 TABLET ORAL
Status: ON HOLD | COMMUNITY
End: 2022-10-25 | Stop reason: SDUPTHER

## 2022-10-21 RX ORDER — ASPIRIN 81 MG/1
81 TABLET ORAL DAILY
Status: DISCONTINUED | OUTPATIENT
Start: 2022-10-22 | End: 2022-10-25 | Stop reason: HOSPADM

## 2022-10-21 RX ORDER — SODIUM CHLORIDE 0.9 % (FLUSH) 0.9 %
3 SYRINGE (ML) INJECTION EVERY 12 HOURS SCHEDULED
Status: DISCONTINUED | OUTPATIENT
Start: 2022-10-21 | End: 2022-10-25 | Stop reason: HOSPADM

## 2022-10-21 RX ORDER — LABETALOL HYDROCHLORIDE 5 MG/ML
10 INJECTION, SOLUTION INTRAVENOUS ONCE
Status: COMPLETED | OUTPATIENT
Start: 2022-10-21 | End: 2022-10-21

## 2022-10-21 RX ADMIN — ENOXAPARIN SODIUM 60 MG: 60 INJECTION SUBCUTANEOUS at 21:48

## 2022-10-21 RX ADMIN — Medication 10 MG: at 17:46

## 2022-10-21 RX ADMIN — ATENOLOL 50 MG: 50 TABLET ORAL at 21:48

## 2022-10-22 ENCOUNTER — READMISSION MANAGEMENT (OUTPATIENT)
Dept: CALL CENTER | Facility: HOSPITAL | Age: 77
End: 2022-10-22

## 2022-10-22 LAB
ALBUMIN SERPL-MCNC: 4 G/DL (ref 3.5–5.2)
ALBUMIN/GLOB SERPL: 1.6 G/DL
ALP SERPL-CCNC: 72 U/L (ref 39–117)
ALT SERPL W P-5'-P-CCNC: 11 U/L (ref 1–33)
ANION GAP SERPL CALCULATED.3IONS-SCNC: 12 MMOL/L (ref 5–15)
AST SERPL-CCNC: 24 U/L (ref 1–32)
BASOPHILS # BLD AUTO: 0.1 10*3/MM3 (ref 0–0.2)
BASOPHILS NFR BLD AUTO: 1.9 % (ref 0–1.5)
BILIRUB SERPL-MCNC: 1.9 MG/DL (ref 0–1.2)
BUN SERPL-MCNC: 14 MG/DL (ref 8–23)
BUN/CREAT SERPL: 13.2 (ref 7–25)
CALCIUM SPEC-SCNC: 9.2 MG/DL (ref 8.6–10.5)
CHLORIDE SERPL-SCNC: 100 MMOL/L (ref 98–107)
CHOLEST SERPL-MCNC: 154 MG/DL (ref 0–200)
CO2 SERPL-SCNC: 26 MMOL/L (ref 22–29)
CREAT SERPL-MCNC: 1.06 MG/DL (ref 0.57–1)
DEPRECATED RDW RBC AUTO: 48.6 FL (ref 37–54)
EGFRCR SERPLBLD CKD-EPI 2021: 54.9 ML/MIN/1.73
EOSINOPHIL # BLD AUTO: 0.1 10*3/MM3 (ref 0–0.4)
EOSINOPHIL NFR BLD AUTO: 2.3 % (ref 0.3–6.2)
ERYTHROCYTE [DISTWIDTH] IN BLOOD BY AUTOMATED COUNT: 14.2 % (ref 12.3–15.4)
GLOBULIN UR ELPH-MCNC: 2.5 GM/DL
GLUCOSE SERPL-MCNC: 164 MG/DL (ref 65–99)
HCT VFR BLD AUTO: 38.8 % (ref 34–46.6)
HDLC SERPL-MCNC: 71 MG/DL (ref 40–60)
HGB BLD-MCNC: 13.3 G/DL (ref 12–15.9)
LDLC SERPL CALC-MCNC: 67 MG/DL (ref 0–100)
LDLC/HDLC SERPL: 0.93 {RATIO}
LYMPHOCYTES # BLD AUTO: 1.5 10*3/MM3 (ref 0.7–3.1)
LYMPHOCYTES NFR BLD AUTO: 27.5 % (ref 19.6–45.3)
MCH RBC QN AUTO: 31.8 PG (ref 26.6–33)
MCHC RBC AUTO-ENTMCNC: 34.3 G/DL (ref 31.5–35.7)
MCV RBC AUTO: 92.6 FL (ref 79–97)
MONOCYTES # BLD AUTO: 0.8 10*3/MM3 (ref 0.1–0.9)
MONOCYTES NFR BLD AUTO: 14.5 % (ref 5–12)
NEUTROPHILS NFR BLD AUTO: 2.9 10*3/MM3 (ref 1.7–7)
NEUTROPHILS NFR BLD AUTO: 53.8 % (ref 42.7–76)
NRBC BLD AUTO-RTO: 0.2 /100 WBC (ref 0–0.2)
PLATELET # BLD AUTO: 259 10*3/MM3 (ref 140–450)
PMV BLD AUTO: 7.5 FL (ref 6–12)
POTASSIUM SERPL-SCNC: 4 MMOL/L (ref 3.5–5.2)
PROT SERPL-MCNC: 6.5 G/DL (ref 6–8.5)
RBC # BLD AUTO: 4.19 10*6/MM3 (ref 3.77–5.28)
SODIUM SERPL-SCNC: 138 MMOL/L (ref 136–145)
TRIGL SERPL-MCNC: 85 MG/DL (ref 0–150)
TSH SERPL DL<=0.05 MIU/L-ACNC: 6.25 UIU/ML (ref 0.27–4.2)
VLDLC SERPL-MCNC: 16 MG/DL (ref 5–40)
WBC NRBC COR # BLD: 5.4 10*3/MM3 (ref 3.4–10.8)

## 2022-10-22 PROCEDURE — 25010000002 ENOXAPARIN PER 10 MG: Performed by: INTERNAL MEDICINE

## 2022-10-22 PROCEDURE — 85025 COMPLETE CBC W/AUTO DIFF WBC: CPT | Performed by: NURSE PRACTITIONER

## 2022-10-22 PROCEDURE — G0378 HOSPITAL OBSERVATION PER HR: HCPCS

## 2022-10-22 PROCEDURE — 80061 LIPID PANEL: CPT | Performed by: NURSE PRACTITIONER

## 2022-10-22 PROCEDURE — 99222 1ST HOSP IP/OBS MODERATE 55: CPT | Performed by: NURSE PRACTITIONER

## 2022-10-22 PROCEDURE — 82607 VITAMIN B-12: CPT | Performed by: NURSE PRACTITIONER

## 2022-10-22 PROCEDURE — 80053 COMPREHEN METABOLIC PANEL: CPT | Performed by: NURSE PRACTITIONER

## 2022-10-22 PROCEDURE — 84443 ASSAY THYROID STIM HORMONE: CPT | Performed by: NURSE PRACTITIONER

## 2022-10-22 PROCEDURE — 25010000002 HYDRALAZINE PER 20 MG: Performed by: INTERNAL MEDICINE

## 2022-10-22 RX ADMIN — LOSARTAN POTASSIUM 50 MG: 50 TABLET, FILM COATED ORAL at 09:13

## 2022-10-22 RX ADMIN — Medication 3 ML: at 09:19

## 2022-10-22 RX ADMIN — FAMOTIDINE 40 MG: 20 TABLET, FILM COATED ORAL at 09:13

## 2022-10-22 RX ADMIN — ENOXAPARIN SODIUM 60 MG: 60 INJECTION SUBCUTANEOUS at 09:13

## 2022-10-22 RX ADMIN — ENOXAPARIN SODIUM 60 MG: 60 INJECTION SUBCUTANEOUS at 21:30

## 2022-10-22 RX ADMIN — HYDRALAZINE HYDROCHLORIDE 10 MG: 20 INJECTION INTRAMUSCULAR; INTRAVENOUS at 11:06

## 2022-10-22 RX ADMIN — ATORVASTATIN CALCIUM 80 MG: 40 TABLET, FILM COATED ORAL at 09:13

## 2022-10-22 RX ADMIN — Medication 3 ML: at 21:30

## 2022-10-22 RX ADMIN — Medication 81 MG: at 09:13

## 2022-10-22 RX ADMIN — ATENOLOL 50 MG: 50 TABLET ORAL at 09:13

## 2022-10-22 NOTE — OUTREACH NOTE
Stroke Week 2 Survey    Flowsheet Row Responses   [facility] facility patient discharged from? [facility]   Does the patient have one of the following disease processes/diagnoses(primary or secondary)? Stroke   Week 2 attempt successful? No   Unsuccessful attempts Attempt 1   Revoke Readmitted          JOSE LEMUS - Registered Nurse

## 2022-10-23 LAB
ALBUMIN SERPL-MCNC: 4.2 G/DL (ref 3.5–5.2)
ALBUMIN/GLOB SERPL: 1.8 G/DL
ALP SERPL-CCNC: 66 U/L (ref 39–117)
ALT SERPL W P-5'-P-CCNC: 12 U/L (ref 1–33)
ANION GAP SERPL CALCULATED.3IONS-SCNC: 11 MMOL/L (ref 5–15)
AST SERPL-CCNC: 24 U/L (ref 1–32)
BILIRUB SERPL-MCNC: 1.7 MG/DL (ref 0–1.2)
BUN SERPL-MCNC: 14 MG/DL (ref 8–23)
BUN/CREAT SERPL: 12.6 (ref 7–25)
CALCIUM SPEC-SCNC: 9.3 MG/DL (ref 8.6–10.5)
CHLORIDE SERPL-SCNC: 102 MMOL/L (ref 98–107)
CO2 SERPL-SCNC: 27 MMOL/L (ref 22–29)
CREAT SERPL-MCNC: 1.11 MG/DL (ref 0.57–1)
EGFRCR SERPLBLD CKD-EPI 2021: 51.9 ML/MIN/1.73
GLOBULIN UR ELPH-MCNC: 2.3 GM/DL
GLUCOSE SERPL-MCNC: 96 MG/DL (ref 65–99)
POTASSIUM SERPL-SCNC: 4.6 MMOL/L (ref 3.5–5.2)
PROT SERPL-MCNC: 6.5 G/DL (ref 6–8.5)
SODIUM SERPL-SCNC: 140 MMOL/L (ref 136–145)
VIT B12 BLD-MCNC: 341 PG/ML (ref 211–946)

## 2022-10-23 PROCEDURE — 25010000002 ENOXAPARIN PER 10 MG: Performed by: INTERNAL MEDICINE

## 2022-10-23 RX ADMIN — ATORVASTATIN CALCIUM 80 MG: 40 TABLET, FILM COATED ORAL at 08:25

## 2022-10-23 RX ADMIN — ATENOLOL 50 MG: 50 TABLET ORAL at 20:11

## 2022-10-23 RX ADMIN — FAMOTIDINE 40 MG: 20 TABLET, FILM COATED ORAL at 08:25

## 2022-10-23 RX ADMIN — ATENOLOL 50 MG: 50 TABLET ORAL at 08:25

## 2022-10-23 RX ADMIN — ENOXAPARIN SODIUM 60 MG: 60 INJECTION SUBCUTANEOUS at 20:11

## 2022-10-23 RX ADMIN — ENOXAPARIN SODIUM 60 MG: 60 INJECTION SUBCUTANEOUS at 08:25

## 2022-10-23 RX ADMIN — Medication 3 ML: at 08:25

## 2022-10-23 RX ADMIN — Medication 81 MG: at 08:25

## 2022-10-23 RX ADMIN — LOSARTAN POTASSIUM 50 MG: 50 TABLET, FILM COATED ORAL at 08:25

## 2022-10-23 RX ADMIN — Medication 3 ML: at 20:11

## 2022-10-24 ENCOUNTER — ANESTHESIA (OUTPATIENT)
Dept: CARDIOLOGY | Facility: HOSPITAL | Age: 77
End: 2022-10-24

## 2022-10-24 ENCOUNTER — ANESTHESIA EVENT (OUTPATIENT)
Dept: CARDIOLOGY | Facility: HOSPITAL | Age: 77
End: 2022-10-24

## 2022-10-24 ENCOUNTER — APPOINTMENT (OUTPATIENT)
Dept: CARDIOLOGY | Facility: HOSPITAL | Age: 77
End: 2022-10-24

## 2022-10-24 LAB
ALBUMIN SERPL-MCNC: 4.1 G/DL (ref 3.5–5.2)
ALBUMIN/GLOB SERPL: 1.8 G/DL
ALP SERPL-CCNC: 67 U/L (ref 39–117)
ALT SERPL W P-5'-P-CCNC: 14 U/L (ref 1–33)
ANION GAP SERPL CALCULATED.3IONS-SCNC: 10 MMOL/L (ref 5–15)
AST SERPL-CCNC: 23 U/L (ref 1–32)
BASOPHILS # BLD AUTO: 0.1 10*3/MM3 (ref 0–0.2)
BASOPHILS NFR BLD AUTO: 1.7 % (ref 0–1.5)
BILIRUB SERPL-MCNC: 1.1 MG/DL (ref 0–1.2)
BUN SERPL-MCNC: 19 MG/DL (ref 8–23)
BUN/CREAT SERPL: 13.4 (ref 7–25)
CALCIUM SPEC-SCNC: 9.3 MG/DL (ref 8.6–10.5)
CHLORIDE SERPL-SCNC: 101 MMOL/L (ref 98–107)
CO2 SERPL-SCNC: 29 MMOL/L (ref 22–29)
CREAT SERPL-MCNC: 1.42 MG/DL (ref 0.57–1)
DEPRECATED RDW RBC AUTO: 47.7 FL (ref 37–54)
EGFRCR SERPLBLD CKD-EPI 2021: 38.7 ML/MIN/1.73
EOSINOPHIL # BLD AUTO: 0.1 10*3/MM3 (ref 0–0.4)
EOSINOPHIL NFR BLD AUTO: 2.6 % (ref 0.3–6.2)
ERYTHROCYTE [DISTWIDTH] IN BLOOD BY AUTOMATED COUNT: 14.2 % (ref 12.3–15.4)
GLOBULIN UR ELPH-MCNC: 2.3 GM/DL
GLUCOSE SERPL-MCNC: 99 MG/DL (ref 65–99)
HBA1C MFR BLD: 5.1 % (ref 3.5–5.6)
HCT VFR BLD AUTO: 38.3 % (ref 34–46.6)
HGB BLD-MCNC: 13.1 G/DL (ref 12–15.9)
INR PPP: 1.69 (ref 2–3)
LYMPHOCYTES # BLD AUTO: 1.6 10*3/MM3 (ref 0.7–3.1)
LYMPHOCYTES NFR BLD AUTO: 29.5 % (ref 19.6–45.3)
MAXIMAL PREDICTED HEART RATE: 145 BPM
MCH RBC QN AUTO: 31.5 PG (ref 26.6–33)
MCHC RBC AUTO-ENTMCNC: 34.3 G/DL (ref 31.5–35.7)
MCV RBC AUTO: 92 FL (ref 79–97)
MONOCYTES # BLD AUTO: 0.8 10*3/MM3 (ref 0.1–0.9)
MONOCYTES NFR BLD AUTO: 14 % (ref 5–12)
NEUTROPHILS NFR BLD AUTO: 2.8 10*3/MM3 (ref 1.7–7)
NEUTROPHILS NFR BLD AUTO: 52.2 % (ref 42.7–76)
NRBC BLD AUTO-RTO: 0.1 /100 WBC (ref 0–0.2)
PLATELET # BLD AUTO: 270 10*3/MM3 (ref 140–450)
PMV BLD AUTO: 7.7 FL (ref 6–12)
POTASSIUM SERPL-SCNC: 4.1 MMOL/L (ref 3.5–5.2)
PROT SERPL-MCNC: 6.4 G/DL (ref 6–8.5)
PROTHROMBIN TIME: 16.9 SECONDS (ref 19.4–28.5)
RBC # BLD AUTO: 4.17 10*6/MM3 (ref 3.77–5.28)
SODIUM SERPL-SCNC: 140 MMOL/L (ref 136–145)
STRESS TARGET HR: 123 BPM
WBC NRBC COR # BLD: 5.4 10*3/MM3 (ref 3.4–10.8)

## 2022-10-24 PROCEDURE — 93312 ECHO TRANSESOPHAGEAL: CPT | Performed by: INTERNAL MEDICINE

## 2022-10-24 PROCEDURE — 85025 COMPLETE CBC W/AUTO DIFF WBC: CPT | Performed by: NURSE PRACTITIONER

## 2022-10-24 PROCEDURE — 25010000002 PROPOFOL 200 MG/20ML EMULSION: Performed by: NURSE ANESTHETIST, CERTIFIED REGISTERED

## 2022-10-24 PROCEDURE — 93320 DOPPLER ECHO COMPLETE: CPT

## 2022-10-24 PROCEDURE — 85610 PROTHROMBIN TIME: CPT | Performed by: NURSE PRACTITIONER

## 2022-10-24 PROCEDURE — B246ZZ4 ULTRASONOGRAPHY OF RIGHT AND LEFT HEART, TRANSESOPHAGEAL: ICD-10-PCS | Performed by: INTERNAL MEDICINE

## 2022-10-24 PROCEDURE — 93320 DOPPLER ECHO COMPLETE: CPT | Performed by: INTERNAL MEDICINE

## 2022-10-24 PROCEDURE — 93325 DOPPLER ECHO COLOR FLOW MAPG: CPT

## 2022-10-24 PROCEDURE — 80053 COMPREHEN METABOLIC PANEL: CPT | Performed by: NURSE PRACTITIONER

## 2022-10-24 PROCEDURE — 25010000002 ENOXAPARIN PER 10 MG: Performed by: INTERNAL MEDICINE

## 2022-10-24 PROCEDURE — 93312 ECHO TRANSESOPHAGEAL: CPT

## 2022-10-24 PROCEDURE — 99223 1ST HOSP IP/OBS HIGH 75: CPT | Performed by: INTERNAL MEDICINE

## 2022-10-24 PROCEDURE — 93325 DOPPLER ECHO COLOR FLOW MAPG: CPT | Performed by: INTERNAL MEDICINE

## 2022-10-24 RX ORDER — PROPOFOL 10 MG/ML
INJECTION, EMULSION INTRAVENOUS AS NEEDED
Status: DISCONTINUED | OUTPATIENT
Start: 2022-10-24 | End: 2022-10-24 | Stop reason: SURG

## 2022-10-24 RX ORDER — LIDOCAINE HYDROCHLORIDE 20 MG/ML
INJECTION, SOLUTION EPIDURAL; INFILTRATION; INTRACAUDAL; PERINEURAL AS NEEDED
Status: DISCONTINUED | OUTPATIENT
Start: 2022-10-24 | End: 2022-10-24 | Stop reason: SURG

## 2022-10-24 RX ORDER — ONDANSETRON 2 MG/ML
4 INJECTION INTRAMUSCULAR; INTRAVENOUS ONCE AS NEEDED
Status: CANCELLED | OUTPATIENT
Start: 2022-10-24

## 2022-10-24 RX ORDER — LABETALOL HYDROCHLORIDE 5 MG/ML
5 INJECTION, SOLUTION INTRAVENOUS
Status: CANCELLED | OUTPATIENT
Start: 2022-10-24

## 2022-10-24 RX ORDER — SODIUM CHLORIDE 9 MG/ML
INJECTION, SOLUTION INTRAVENOUS CONTINUOUS PRN
Status: DISCONTINUED | OUTPATIENT
Start: 2022-10-24 | End: 2022-10-24 | Stop reason: SURG

## 2022-10-24 RX ADMIN — PROPOFOL 20 MG: 10 INJECTION, EMULSION INTRAVENOUS at 07:53

## 2022-10-24 RX ADMIN — ENOXAPARIN SODIUM 60 MG: 60 INJECTION SUBCUTANEOUS at 10:03

## 2022-10-24 RX ADMIN — Medication 81 MG: at 10:06

## 2022-10-24 RX ADMIN — ATENOLOL 50 MG: 50 TABLET ORAL at 10:05

## 2022-10-24 RX ADMIN — FAMOTIDINE 40 MG: 20 TABLET, FILM COATED ORAL at 10:06

## 2022-10-24 RX ADMIN — Medication 3 ML: at 20:32

## 2022-10-24 RX ADMIN — PROPOFOL 20 MG: 10 INJECTION, EMULSION INTRAVENOUS at 07:49

## 2022-10-24 RX ADMIN — SODIUM CHLORIDE: 0.9 INJECTION, SOLUTION INTRAVENOUS at 07:40

## 2022-10-24 RX ADMIN — LOSARTAN POTASSIUM 50 MG: 50 TABLET, FILM COATED ORAL at 10:05

## 2022-10-24 RX ADMIN — ATENOLOL 50 MG: 50 TABLET ORAL at 20:31

## 2022-10-24 RX ADMIN — ENOXAPARIN SODIUM 60 MG: 60 INJECTION SUBCUTANEOUS at 20:32

## 2022-10-24 RX ADMIN — ATORVASTATIN CALCIUM 80 MG: 40 TABLET, FILM COATED ORAL at 10:05

## 2022-10-24 RX ADMIN — Medication 3 ML: at 10:07

## 2022-10-24 RX ADMIN — PROPOFOL 20 MG: 10 INJECTION, EMULSION INTRAVENOUS at 07:51

## 2022-10-24 RX ADMIN — PROPOFOL 80 MG: 10 INJECTION, EMULSION INTRAVENOUS at 07:48

## 2022-10-24 RX ADMIN — LIDOCAINE HYDROCHLORIDE 40 MG: 20 INJECTION, SOLUTION EPIDURAL; INFILTRATION; INTRACAUDAL; PERINEURAL at 07:46

## 2022-10-25 ENCOUNTER — READMISSION MANAGEMENT (OUTPATIENT)
Dept: CALL CENTER | Facility: HOSPITAL | Age: 77
End: 2022-10-25

## 2022-10-25 VITALS
BODY MASS INDEX: 21.88 KG/M2 | HEART RATE: 84 BPM | OXYGEN SATURATION: 97 % | DIASTOLIC BLOOD PRESSURE: 93 MMHG | SYSTOLIC BLOOD PRESSURE: 161 MMHG | RESPIRATION RATE: 17 BRPM | WEIGHT: 123.46 LBS | TEMPERATURE: 97.7 F | HEIGHT: 63 IN

## 2022-10-25 PROBLEM — I74.9 TIA DUE TO EMBOLISM (HCC): Status: ACTIVE | Noted: 2022-10-25

## 2022-10-25 PROBLEM — G45.9 TIA DUE TO EMBOLISM: Status: ACTIVE | Noted: 2022-10-25

## 2022-10-25 LAB
ALBUMIN SERPL-MCNC: 3.9 G/DL (ref 3.5–5.2)
ALBUMIN/GLOB SERPL: 1.6 G/DL
ALP SERPL-CCNC: 70 U/L (ref 39–117)
ALT SERPL W P-5'-P-CCNC: 16 U/L (ref 1–33)
ANION GAP SERPL CALCULATED.3IONS-SCNC: 13 MMOL/L (ref 5–15)
AST SERPL-CCNC: 25 U/L (ref 1–32)
BASOPHILS # BLD AUTO: 0.1 10*3/MM3 (ref 0–0.2)
BASOPHILS NFR BLD AUTO: 1.5 % (ref 0–1.5)
BILIRUB SERPL-MCNC: 1.3 MG/DL (ref 0–1.2)
BUN SERPL-MCNC: 20 MG/DL (ref 8–23)
BUN/CREAT SERPL: 16.8 (ref 7–25)
CALCIUM SPEC-SCNC: 9.3 MG/DL (ref 8.6–10.5)
CHLORIDE SERPL-SCNC: 103 MMOL/L (ref 98–107)
CO2 SERPL-SCNC: 25 MMOL/L (ref 22–29)
CREAT SERPL-MCNC: 1.19 MG/DL (ref 0.57–1)
DEPRECATED RDW RBC AUTO: 45.9 FL (ref 37–54)
EGFRCR SERPLBLD CKD-EPI 2021: 47.8 ML/MIN/1.73
EOSINOPHIL # BLD AUTO: 0.1 10*3/MM3 (ref 0–0.4)
EOSINOPHIL NFR BLD AUTO: 2 % (ref 0.3–6.2)
ERYTHROCYTE [DISTWIDTH] IN BLOOD BY AUTOMATED COUNT: 14.2 % (ref 12.3–15.4)
GLOBULIN UR ELPH-MCNC: 2.5 GM/DL
GLUCOSE SERPL-MCNC: 86 MG/DL (ref 65–99)
HCT VFR BLD AUTO: 39.3 % (ref 34–46.6)
HGB BLD-MCNC: 12.7 G/DL (ref 12–15.9)
INR PPP: 1.36 (ref 2–3)
LYMPHOCYTES # BLD AUTO: 1.6 10*3/MM3 (ref 0.7–3.1)
LYMPHOCYTES NFR BLD AUTO: 29.5 % (ref 19.6–45.3)
MCH RBC QN AUTO: 30.3 PG (ref 26.6–33)
MCHC RBC AUTO-ENTMCNC: 32.3 G/DL (ref 31.5–35.7)
MCV RBC AUTO: 93.8 FL (ref 79–97)
MONOCYTES # BLD AUTO: 0.7 10*3/MM3 (ref 0.1–0.9)
MONOCYTES NFR BLD AUTO: 12.9 % (ref 5–12)
NEUTROPHILS NFR BLD AUTO: 3 10*3/MM3 (ref 1.7–7)
NEUTROPHILS NFR BLD AUTO: 54.1 % (ref 42.7–76)
NRBC BLD AUTO-RTO: 0.1 /100 WBC (ref 0–0.2)
PLATELET # BLD AUTO: 260 10*3/MM3 (ref 140–450)
PMV BLD AUTO: 7.7 FL (ref 6–12)
POTASSIUM SERPL-SCNC: 4.2 MMOL/L (ref 3.5–5.2)
PROT SERPL-MCNC: 6.4 G/DL (ref 6–8.5)
PROTHROMBIN TIME: 13.8 SECONDS (ref 19.4–28.5)
RBC # BLD AUTO: 4.2 10*6/MM3 (ref 3.77–5.28)
SODIUM SERPL-SCNC: 141 MMOL/L (ref 136–145)
WBC NRBC COR # BLD: 5.6 10*3/MM3 (ref 3.4–10.8)

## 2022-10-25 PROCEDURE — 85025 COMPLETE CBC W/AUTO DIFF WBC: CPT | Performed by: NURSE PRACTITIONER

## 2022-10-25 PROCEDURE — 80053 COMPREHEN METABOLIC PANEL: CPT | Performed by: NURSE PRACTITIONER

## 2022-10-25 PROCEDURE — 25010000002 ENOXAPARIN PER 10 MG: Performed by: INTERNAL MEDICINE

## 2022-10-25 PROCEDURE — 85610 PROTHROMBIN TIME: CPT | Performed by: NURSE PRACTITIONER

## 2022-10-25 PROCEDURE — 99233 SBSQ HOSP IP/OBS HIGH 50: CPT | Performed by: INTERNAL MEDICINE

## 2022-10-25 RX ORDER — WARFARIN SODIUM 5 MG/1
TABLET ORAL
Start: 2022-10-25 | End: 2022-11-17

## 2022-10-25 RX ORDER — CLONIDINE HYDROCHLORIDE 0.1 MG/1
0.1 TABLET ORAL 3 TIMES DAILY PRN
Qty: 90 TABLET | Refills: 1
Start: 2022-10-25 | End: 2022-10-25 | Stop reason: SDUPTHER

## 2022-10-25 RX ORDER — FAMOTIDINE 40 MG/1
40 TABLET, FILM COATED ORAL DAILY
Qty: 30 TABLET | Refills: 1 | Status: SHIPPED | OUTPATIENT
Start: 2022-10-26 | End: 2022-10-25 | Stop reason: SDUPTHER

## 2022-10-25 RX ORDER — LOSARTAN POTASSIUM 50 MG/1
50 TABLET ORAL
Qty: 90 TABLET | Refills: 1 | Status: SHIPPED | OUTPATIENT
Start: 2022-10-26

## 2022-10-25 RX ORDER — ACETAMINOPHEN 325 MG/1
650 TABLET ORAL EVERY 4 HOURS PRN
Start: 2022-10-25

## 2022-10-25 RX ORDER — FAMOTIDINE 40 MG/1
40 TABLET, FILM COATED ORAL DAILY
Qty: 30 TABLET | Refills: 1 | Status: SHIPPED | OUTPATIENT
Start: 2022-10-26 | End: 2022-11-10

## 2022-10-25 RX ORDER — CLONIDINE HYDROCHLORIDE 0.1 MG/1
0.1 TABLET ORAL 3 TIMES DAILY PRN
Qty: 90 TABLET | Refills: 1 | Status: SHIPPED | OUTPATIENT
Start: 2022-10-25 | End: 2022-10-25 | Stop reason: SDUPTHER

## 2022-10-25 RX ORDER — WARFARIN SODIUM 5 MG/1
5 TABLET ORAL
Status: DISCONTINUED | OUTPATIENT
Start: 2022-10-25 | End: 2022-10-25 | Stop reason: HOSPADM

## 2022-10-25 RX ORDER — LOSARTAN POTASSIUM 50 MG/1
50 TABLET ORAL
Start: 2022-10-26 | End: 2022-10-25 | Stop reason: SDUPTHER

## 2022-10-25 RX ORDER — ENOXAPARIN SODIUM 100 MG/ML
60 INJECTION SUBCUTANEOUS EVERY 12 HOURS SCHEDULED
Qty: 8.4 ML | Refills: 1
Start: 2022-10-25 | End: 2022-11-10

## 2022-10-25 RX ORDER — LOSARTAN POTASSIUM 50 MG/1
50 TABLET ORAL
Qty: 90 TABLET | Refills: 1 | Status: SHIPPED | OUTPATIENT
Start: 2022-10-26 | End: 2022-10-25 | Stop reason: SDUPTHER

## 2022-10-25 RX ORDER — ENOXAPARIN SODIUM 100 MG/ML
60 INJECTION SUBCUTANEOUS EVERY 12 HOURS SCHEDULED
Qty: 8.4 ML | Refills: 1 | Status: SHIPPED | OUTPATIENT
Start: 2022-10-25 | End: 2022-10-25 | Stop reason: SDUPTHER

## 2022-10-25 RX ORDER — CLONIDINE HYDROCHLORIDE 0.1 MG/1
0.1 TABLET ORAL 3 TIMES DAILY PRN
Status: DISCONTINUED | OUTPATIENT
Start: 2022-10-25 | End: 2022-10-25 | Stop reason: HOSPADM

## 2022-10-25 RX ORDER — CLONIDINE HYDROCHLORIDE 0.1 MG/1
0.1 TABLET ORAL 3 TIMES DAILY PRN
Qty: 90 TABLET | Refills: 1 | Status: SHIPPED | OUTPATIENT
Start: 2022-10-25

## 2022-10-25 RX ADMIN — ENOXAPARIN SODIUM 60 MG: 60 INJECTION SUBCUTANEOUS at 09:44

## 2022-10-25 RX ADMIN — ATENOLOL 50 MG: 50 TABLET ORAL at 09:44

## 2022-10-25 RX ADMIN — Medication 81 MG: at 09:47

## 2022-10-25 RX ADMIN — ATORVASTATIN CALCIUM 80 MG: 40 TABLET, FILM COATED ORAL at 09:44

## 2022-10-25 RX ADMIN — Medication 3 ML: at 09:47

## 2022-10-25 RX ADMIN — FAMOTIDINE 40 MG: 20 TABLET, FILM COATED ORAL at 09:44

## 2022-10-25 RX ADMIN — LOSARTAN POTASSIUM 50 MG: 50 TABLET, FILM COATED ORAL at 09:44

## 2022-10-26 ENCOUNTER — ANTICOAGULATION VISIT (OUTPATIENT)
Dept: CARDIOLOGY | Facility: CLINIC | Age: 77
End: 2022-10-26

## 2022-10-26 DIAGNOSIS — Z79.01 LONG TERM (CURRENT) USE OF ANTICOAGULANTS: Primary | ICD-10-CM

## 2022-10-26 LAB — INR PPP: 1.4

## 2022-10-26 NOTE — OUTREACH NOTE
Prep Survey    Flowsheet Row Responses   [facility] facility patient discharged from? [facility]   Is LACE score < 7 ? No   Emergency Room discharge w/ pulse ox? No   Eligibility Readm Mgmt   Discharge diagnosis TIA,  aphasia   Does the patient have one of the following disease processes/diagnoses(primary or secondary)? Stroke   Does the patient have Home health ordered? No   Is there a DME ordered? No   Prep survey completed? Yes          VERENA TRIPLETT - Registered Nurse

## 2022-10-27 ENCOUNTER — ANTICOAGULATION VISIT (OUTPATIENT)
Dept: CARDIOLOGY | Facility: CLINIC | Age: 77
End: 2022-10-27

## 2022-10-27 ENCOUNTER — TELEPHONE (OUTPATIENT)
Dept: CARDIOLOGY | Facility: CLINIC | Age: 77
End: 2022-10-27

## 2022-10-27 DIAGNOSIS — Z79.01 LONG TERM (CURRENT) USE OF ANTICOAGULANTS: ICD-10-CM

## 2022-10-27 DIAGNOSIS — I63.40 CEREBROVASCULAR ACCIDENT (CVA) DUE TO EMBOLISM OF CEREBRAL ARTERY: ICD-10-CM

## 2022-10-27 DIAGNOSIS — I48.20 ATRIAL FIBRILLATION, CHRONIC: Primary | ICD-10-CM

## 2022-10-27 DIAGNOSIS — Z79.01 LONG TERM (CURRENT) USE OF ANTICOAGULANTS: Primary | ICD-10-CM

## 2022-10-27 DIAGNOSIS — Z86.73 HISTORY OF CVA (CEREBROVASCULAR ACCIDENT): ICD-10-CM

## 2022-10-27 LAB — INR PPP: 1.6

## 2022-10-27 NOTE — PROGRESS NOTES
Patient taking Lovenox and warfarin until INR 3.0. Advised to to continue and take 7.5 mg today. Patient to check INR tomorrow.

## 2022-10-28 ENCOUNTER — TELEPHONE (OUTPATIENT)
Dept: CARDIOLOGY | Facility: CLINIC | Age: 77
End: 2022-10-28

## 2022-10-28 ENCOUNTER — READMISSION MANAGEMENT (OUTPATIENT)
Dept: CALL CENTER | Facility: HOSPITAL | Age: 77
End: 2022-10-28

## 2022-10-28 ENCOUNTER — ANTICOAGULATION VISIT (OUTPATIENT)
Dept: CARDIOLOGY | Facility: CLINIC | Age: 77
End: 2022-10-28

## 2022-10-28 DIAGNOSIS — Z79.01 LONG TERM (CURRENT) USE OF ANTICOAGULANTS: Primary | ICD-10-CM

## 2022-10-28 LAB — INR PPP: 2.1

## 2022-10-28 NOTE — OUTREACH NOTE
Stroke Week 1 Survey    Flowsheet Row Responses   [facility] facility patient discharged from? [facility]   Does the patient have one of the following disease processes/diagnoses(primary or secondary)? Stroke   Week 1 attempt successful? No   Unsuccessful attempts Attempt 1          KM Kendall Registered Nurse

## 2022-10-28 NOTE — TELEPHONE ENCOUNTER
Caller: [facility]    Relationship: PROVIDER    Best call back number: [phone]- [phone]    What is the best time to reach you: ANY            What was the call regarding: PT REPORTED OUT OF RANGE INR RESULT OF 2.1 TODAY TO [facility]. THEY CALLED TO INFORM US.     Do you require a callback: YES

## 2022-10-28 NOTE — PROGRESS NOTES
Spoke to Linda BARBA:INR at 2.1. She will take 7.5 mgs today of Warfarin, then 5 mgs on Sat and Sun. Continue Lovenox until recheck on Monday. INR goal to stop Lovenox 2.5, with actual goal of 3.0-3.5 for INR. Opal

## 2022-10-31 ENCOUNTER — TELEPHONE (OUTPATIENT)
Dept: CARDIOLOGY | Facility: CLINIC | Age: 77
End: 2022-10-31

## 2022-10-31 ENCOUNTER — ANTICOAGULATION VISIT (OUTPATIENT)
Dept: CARDIOLOGY | Facility: CLINIC | Age: 77
End: 2022-10-31

## 2022-10-31 DIAGNOSIS — Z79.01 LONG TERM (CURRENT) USE OF ANTICOAGULANTS: Primary | ICD-10-CM

## 2022-10-31 LAB — INR PPP: 3.6

## 2022-10-31 NOTE — TELEPHONE ENCOUNTER
----- Message from Melody Hernandez sent at 10/30/2022  7:07 PM EDT -----  Regarding: Scheduling WATCHMAN implant   Contact: [phone]  Hi Dr. Damon,  I saw you at the hospital last week while I was admitted after suffering two minor strokes. After my DRAKE it was determined that I needed the WATCHMAN implant. I haven’t heard from your  yet. I understand there is wait and I would like to get my date on the calendar as soon as possible. I am very anxious as you can imagine. Thank you so much!   Gratefully,  Linda Hernandez

## 2022-10-31 NOTE — PROGRESS NOTES
Spoke to pt. She has stopped Lovenox injections, will take 2.5 mgs of Warfarin tonight and the rest of the week. Will recheck INR on Friday. Opal   Detail Level: Detailed

## 2022-10-31 NOTE — TELEPHONE ENCOUNTER
Her procedure has been ordered, but it has not yet been scheduled. Waiting on the Watchman Coordinator to get her scheduled.

## 2022-11-01 ENCOUNTER — READMISSION MANAGEMENT (OUTPATIENT)
Dept: CALL CENTER | Facility: HOSPITAL | Age: 77
End: 2022-11-01

## 2022-11-01 NOTE — OUTREACH NOTE
Stroke Week 1 Survey    Flowsheet Row Responses   [facility] patient discharged from? [facility]   Does the patient have one of the following disease processes/diagnoses(primary or secondary)? Stroke   Week 1 attempt successful? Yes   Call start time 0850   Call end time [phone]   Discharge diagnosis TIA,  aphasia   Medication alerts for this patient WARFARIN 2.5 mg (INR 3.6 10/31/22)---MD wants closer to 3, INR to be rechecked 11/4/22   Meds reviewed with patient/caregiver? Yes   Is the patient having any side effects they believe may be caused by any medication additions or changes? No   Does the patient have all medications ordered at discharge? Yes   Is the patient taking all medications as directed (includes completed medication regime)? Yes   Comments regarding appointments Cardiology on 11/10/22   Does the patient have a primary care provider?  Yes   Does the patient have an appointment with their PCP within 7 days of discharge? Greater than 7 days   Comments regarding PCP PCP appt on 11/14/22   Nursing Interventions Verified appointment date/time/provider   Has the patient kept scheduled appointments due by today? N/A   Has home health visited the patient within 72 hours of discharge? N/A   Psychosocial issues? No   Does the patient require any assistance with activities of daily living such as eating, bathing, dressing, walking, etc.? No   Does the patient have any residual symptoms from stroke/TIA? No   Does the patient understand the diet ordered at discharge? Yes  [Eats regular diet]   Did the patient receive a copy of their discharge instructions? Yes   Nursing interventions Reviewed instructions with patient   What is the patient's perception of their health status since discharge? Improving  [Doing well--no residual effects.   Awaiting a call to be placed on list for Watchman Procedure.  Checks BP qid and has only had to take prn clonidine x 2 doses.  ]   Nursing interventions Nurse provided patient  education   Is the patient/caregiver able to teach back the risk factors for a stroke? High blood pressure-goal below 120/80, History of TIAs   Is the patient/caregiver able to teach back signs and symptoms related to disease process for when to call PCP? Yes   Is the patient/caregiver able to teach back signs and symptoms related to disease process for when to call 911? Yes   If the patient is a current smoker, are they able to teach back resources for cessation? Not a smoker   Is the patient/caregiver able to teach back the hierarchy of who to call/visit for symptoms/problems? PCP, Specialist, Home health nurse, Urgent Care, ED, 911 Yes   Is the patient able to teach back FAST for Stroke? B alance: Watch for sudden loss of balance, E yes: Check for vision loss, F ace: Look for an uneven smile, A rm: Check if one arm is weak, S peech: Listen for slurred speech, T kit: Call 9-1-1 right away  [Reviewed s/s stroke]   Week 1 call completed? Yes          JOSEPH BLANDON - Registered Nurse

## 2022-11-03 PROBLEM — Z86.73 HISTORY OF CVA (CEREBROVASCULAR ACCIDENT): Status: ACTIVE | Noted: 2022-11-03

## 2022-11-04 ENCOUNTER — TELEPHONE (OUTPATIENT)
Dept: CARDIOLOGY | Facility: CLINIC | Age: 77
End: 2022-11-04

## 2022-11-04 ENCOUNTER — ANTICOAGULATION VISIT (OUTPATIENT)
Dept: CARDIOLOGY | Facility: CLINIC | Age: 77
End: 2022-11-04

## 2022-11-04 DIAGNOSIS — Z79.01 LONG TERM (CURRENT) USE OF ANTICOAGULANTS: Primary | ICD-10-CM

## 2022-11-04 LAB — INR PPP: 2.4

## 2022-11-04 NOTE — PROGRESS NOTES
"Enter Query Response Below      Query Response:     Stroke ruled out, TIA only.         If applicable, please update the problem list.        Patient: Melody Hernandez        : 1945  Account: [id number]           Admit Date: 10/21/2022        How to Respond to this query:       a. Click New Note     b. Answer query within the yellow box.                c. Update the Problem List, if applicable.      If you have any questions about this query contact me at: [email or web address]    ,     76 y.o. F admitted 10/21 with mild aphasia. H&P includes: \"second episode of neurologic deficit in 2 different neurologic distributions is concerning for cardioembolic source\".  Cardiology progress note 10/25 includes: \"Clinically patient has right higher parietal small stroke. CT angiogram was negative.  MRI could not be performed due to mechanical mitral valve replacement.\" Discharge summary includes: \"TIA due to embolism\" and \"She underwent DRAKE that showed evidence of recent left atrial  clot with \"smoking\" phenomenon\". Patient was treated with Lovenox injections 10/21-10/24.    Can this be further clarified as:  -stroke ruled in  -stroke ruled out, transient ischemic attack only   -other________________  -unable to determine    By submitting this query, we are merely seeking further clarification of documentation to accurately reflect all conditions that you are monitoring, evaluating, treating or that extend the hospitalization or utilize additional resources of care. Please utilize your independent clinical judgment when addressing the question(s) above.     This query and your response, once completed, will be entered into the legal medical record.    Sincerely,  Kimmie Gordon RN  Clinical Documentation Integrity Program   [email or web address]    "

## 2022-11-04 NOTE — PROGRESS NOTES
Spoke to pt. Take 5 mgs. Mon and Fri with a recheck in a week. Scheduled for watchman in December. Opal

## 2022-11-10 ENCOUNTER — OFFICE VISIT (OUTPATIENT)
Dept: CARDIOLOGY | Facility: CLINIC | Age: 77
End: 2022-11-10

## 2022-11-10 ENCOUNTER — READMISSION MANAGEMENT (OUTPATIENT)
Dept: CALL CENTER | Facility: HOSPITAL | Age: 77
End: 2022-11-10

## 2022-11-10 VITALS
HEART RATE: 68 BPM | WEIGHT: 126 LBS | OXYGEN SATURATION: 99 % | BODY MASS INDEX: 22.32 KG/M2 | HEIGHT: 63 IN | SYSTOLIC BLOOD PRESSURE: 176 MMHG | DIASTOLIC BLOOD PRESSURE: 99 MMHG

## 2022-11-10 DIAGNOSIS — Z79.01 LONG TERM (CURRENT) USE OF ANTICOAGULANTS: Primary | ICD-10-CM

## 2022-11-10 DIAGNOSIS — Z95.2 S/P MVR (MITRAL VALVE REPLACEMENT): ICD-10-CM

## 2022-11-10 DIAGNOSIS — E78.5 DYSLIPIDEMIA: ICD-10-CM

## 2022-11-10 DIAGNOSIS — I63.9 CEREBROVASCULAR ACCIDENT (CVA), UNSPECIFIED MECHANISM: ICD-10-CM

## 2022-11-10 DIAGNOSIS — I48.20 ATRIAL FIBRILLATION, CHRONIC: ICD-10-CM

## 2022-11-10 DIAGNOSIS — I10 ESSENTIAL HYPERTENSION: ICD-10-CM

## 2022-11-10 DIAGNOSIS — Z95.2 HISTORY OF MITRAL VALVE REPLACEMENT: ICD-10-CM

## 2022-11-10 DIAGNOSIS — Z86.73 HISTORY OF CVA (CEREBROVASCULAR ACCIDENT): ICD-10-CM

## 2022-11-10 DIAGNOSIS — I63.40 CEREBROVASCULAR ACCIDENT (CVA) DUE TO EMBOLISM OF CEREBRAL ARTERY: ICD-10-CM

## 2022-11-10 DIAGNOSIS — R47.01 MILD APHASIA: ICD-10-CM

## 2022-11-10 PROCEDURE — 99214 OFFICE O/P EST MOD 30 MIN: CPT | Performed by: INTERNAL MEDICINE

## 2022-11-10 NOTE — PROGRESS NOTES
Encounter Date:11/10/2022  Last seen 10/25/2022      Patient ID: Melody Hernandez is a 76 y.o. female.    Chief Complaint:  Status post mitral valve replacement (mechanical  Anticoagulation management.  History of stroke  Hypertension      History of present illness  Patient recently was admitted with recurrent TIA and expressive aphasia that has improved.  Patient is being evaluated and scheduled for watchman procedure 12/29/2022    Since I have last seen, the patient has been without any chest discomfort ,shortness of breath, palpitations, dizziness or syncope.  Denies having any headache ,abdominal pain ,nausea, vomiting , diarrhea constipation, loss of weight or loss of appetite.  Denies having any excessive bruising ,hematuria or blood in the stool.    Review of all systems negative except as indicated.    Reviewed ROS.    Assessment and plan    ///////////////////  Impression  ==============  -Recent recurrent TIA with expressive aphasia-improved.     -Recent left arm tingling and numbness and difficulty grasping pressure improved.  Clinically patient has right higher parietal small stroke.  CT angiogram was negative.  MRI could not be performed due to mechanical mitral valve replacement      - Status post mitral valve replacement with [place] mechanical heart valve and pulmonary vein isolation for atrial fibrillation May 2010 (patient had history of significant mitral valve prolapse and mitral regurgitation).     -   Persistent and chronic atrial fibrillation . Status post electrical cardioversion 05/24/2013 and 05/13/2016.   Patient is in atrial fibrillation today.     -moderate tricuspid regurgitation     - left bundle-branch block     Transesophageal echocardiogram 10/24/2022  Mitral mechanical valve is structurally normal with mild mitral regurgitation.  Mild to moderate tricuspid regurgitation is present with calculated pulmonary artery pressure was 26 mmHg.  Severe left atrial enlargement as well as  left atrial appendage enlargement without definite clot.  However, severe smoking effect is present in the left atrium and left atrial appendage.  Left ventricle is normal in size and contractility with ejection fraction of 60%.  Aortic intimal thickening is present without clot.     Echocardiogram-8/23/2022 revealed mild tricuspid regurgitation normally functioning mitral prosthetic valve and normal left ventricle function.  Left atrial enlargement.  Lexiscan test-normal- 8/23/2022     Lexiscan Cardiolite test-normal 6/25/2020  Echocardiogram 6/25/2020 revealed biatrial enlargement structurally and functionally normal mitral prosthetic valve moderate tricuspid regurgitation.  Left ventricle ejection fraction is 60%.-  Exertional shortness of breath and fatigue  -stable and improved     Lexiscan Cardiolite test is negative for myocardial ischemia 10/25/2016.  Echocardiogram 10/25/2016 revealed normally functioning prosthetic mitral valve biatrial enlargement moderate mitral regurgitation and thickened aortic valve.     - Dyslipidemia and hypertension      - Smoker      - Family history of coronary artery disease  .   - allergy to Cipro and codeine     -history of intolerance to amiodarone.  ============  Plan  ================  Patient admitted recently with expressive aphasia.-Resolved.  Recurrent TIA.  Recent left arm tingling and numbness and difficulty grasping pressure improved.  Clinically patient has right higher parietal small stroke.  CT angiogram was negative.  MRI could not be performed due to mechanical mitral valve replacement      Status post mechanical mitral valve replacement  EKG showed atrial fibrillation with controlled ventricular response     Patient had episodes of TIA while she had adequate anticoagulation with Coumadin.  Patient does have significant smoking effect in the left atrium and left atrial appendage.  Have discussed with structural heart specialist Dr. Damon regarding possibility of  placing watchman procedure and continue anticoagulation with Coumadin for mechanical mitral valve.  Hopefully this will decrease risk of recurrent TIA/strokes.  Risks and benefits pros and cons of the procedure were discussed with patient and the rationale behind having watchman procedure.  Patient wants to have watchman procedure performed soon possible.  Watchman procedure scheduled for 12/29/2022     Anticoagulation status reviewed.  INR 2.4-11/4/2022.  Goal is still maintain INR around 3.  Patient is now on Coumadin 2.5 mg 5 days a week and increase to 5 mg 2 times a week.    Renal dysfunction  BUN/creatinine-19/1.42     Dyslipidemia-continue atorvastatin     Hypertension- 176/99.  Blood pressure is running normal at home.     Medications were reviewed and updated.     Follow-up in the office in 8 weeks.     Further plan will depend on patient's progress.  [[[[[[[[[[[[[[[[[[[[[[[[[                      Diagnosis Plan   1. Long term (current) use of anticoagulants        2. History of CVA (cerebrovascular accident)        3. Mild aphasia        4. Essential hypertension        5. S/P MVR (mitral valve replacement)        6. Dyslipidemia        7. Cerebrovascular accident (CVA), unspecified mechanism (HCC)        8. History of mitral valve replacement        9. Cerebrovascular accident (CVA) due to embolism of cerebral artery (HCC)        10. Atrial fibrillation, chronic (HCC)        LAB RESULTS (LAST 7 DAYS)    CBC        BMP        CMP         BNP        TROPONIN        CoAg  Results from last 7 days   Lab Units 11/04/22  0000   INR  2.40       Creatinine Clearance  Estimated Creatinine Clearance: 36.9 mL/min (A) (by C-G formula based on SCr of 1.19 mg/dL (H)).    ABG        Radiology  No radiology results for the last day                The following portions of the patient's history were reviewed and updated as appropriate: allergies, current medications, past family history, past medical history, past social  history, past surgical history and problem list.    Review of Systems   Constitutional: Negative for malaise/fatigue.   Cardiovascular: Negative for chest pain, dyspnea on exertion, leg swelling and palpitations.   Respiratory: Negative for cough and shortness of breath.    Gastrointestinal: Negative for abdominal pain, nausea and vomiting.   Neurological: Negative for dizziness, focal weakness, headaches, light-headedness and numbness.   All other systems reviewed and are negative.        Current Outpatient Medications:   •  acetaminophen (TYLENOL) 325 MG tablet, Take 2 tablets by mouth Every 4 (Four) Hours As Needed for Mild Pain., Disp: , Rfl:   •  aspirin (aspirin) 81 MG EC tablet, Take 1 tablet by mouth Daily., Disp: , Rfl:   •  atenolol (TENORMIN) 100 MG tablet, Take 50 mg by mouth 2 (Two) Times a Day., Disp: , Rfl:   •  atorvastatin (LIPITOR) 80 MG tablet, Take 1 tablet by mouth Daily., Disp: 30 tablet, Rfl: 1  •  cloNIDine (CATAPRES) 0.1 MG tablet, Take 1 tablet by mouth 3 (Three) Times a Day As Needed for High Blood Pressure (SBP > 150)., Disp: 90 tablet, Rfl: 1  •  losartan (COZAAR) 50 MG tablet, Take 1 tablet by mouth Daily., Disp: 90 tablet, Rfl: 1  •  warfarin (COUMADIN) 5 MG tablet, Take 5 mg daily (Patient taking differently: Take 5 mg  M/F  2.5 all other days), Disp: , Rfl:     Allergies   Allergen Reactions   • Ciprofloxacin Hives   • Codeine GI Intolerance       Family History   Problem Relation Age of Onset   • Heart disease Mother    • Heart attack Mother    • Hypertension Mother        Past Surgical History:   Procedure Laterality Date   • APPENDECTOMY     • CARDIOVERSION     • CHOLECYSTECTOMY     • MITRAL VALVE REPLACEMENT  05/20/2010    Mechanical    • TONSILLECTOMY         Past Medical History:   Diagnosis Date   • Hyperlipidemia    • Mitral valve prolapse        Family History   Problem Relation Age of Onset   • Heart disease Mother    • Heart attack Mother    • Hypertension Mother   "      Social History     Socioeconomic History   • Marital status:    Tobacco Use   • Smoking status: Former     Types: Cigarettes     Quit date:      Years since quittin.8   • Smokeless tobacco: Never   Vaping Use   • Vaping Use: Never used   Substance and Sexual Activity   • Alcohol use: Yes     Comment: social   • Drug use: Never   • Sexual activity: Defer         Procedures      Objective:       Physical Exam    /99 (BP Location: Right arm, Patient Position: Sitting, Cuff Size: Adult)   Pulse 68   Ht 160 cm (63\")   Wt 57.2 kg (126 lb)   SpO2 99%   BMI 22.32 kg/m²   The patient is alert, oriented and in no distress.    Vital signs as noted above.    Head and neck revealed no carotid bruits or jugular venous distension.  No thyromegaly or lymphadenopathy is present.    Lungs clear.  No wheezing.  Breath sounds are normal bilaterally.    Heart normal and crisp prosthetic heart sounds.  No murmur..  No pericardial rub is present.  No gallop is present.    Abdomen soft and nontender.  No organomegaly is present.    Extremities revealed good peripheral pulses without any pedal edema.    Skin warm and dry.    Musculoskeletal system is grossly normal.    CNS grossly normal.    Reviewed and updated.        "

## 2022-11-10 NOTE — OUTREACH NOTE
Stroke Week 2 Survey    Flowsheet Row Responses   [facility] patient discharged from? [facility]   Does the patient have one of the following disease processes/diagnoses(primary or secondary)? Stroke   Week 2 attempt successful? Yes   Call start time 1332   Call end time 1337   Discharge diagnosis TIA,  aphasia   Is patient permission given to speak with other caregiver? Yes   Person spoke with today (if not patient) and relationship Iva   Meds reviewed with patient/caregiver? Yes   Is the patient having any side effects they believe may be caused by any medication additions or changes? No   Does the patient have all medications ordered at discharge? Yes   Is the patient taking all medications as directed (includes completed medication regime)? Yes   Does the patient have a primary care provider?  Yes   Does the patient have an appointment with their PCP within 7 days of discharge? Yes   Has the patient kept scheduled appointments due by today? Yes   Has home health visited the patient within 72 hours of discharge? N/A   Psychosocial issues? No   Does the patient require any assistance with activities of daily living such as eating, bathing, dressing, walking, etc.? No   Does the patient have any residual symptoms from stroke/TIA? No   Does the patient understand the diet ordered at discharge? Yes   Comments She avoids salt.   Did the patient receive a copy of their discharge instructions? Yes   Nursing interventions Reviewed instructions with patient   What is the patient's perception of their health status since discharge? Improving   Nursing interventions Nurse provided patient education   Is the patient able to teach back FAST for Stroke? B alance: Watch for sudden loss of balance, E yes: Check for vision loss, F ace: Look for an uneven smile, A rm: Check if one arm is weak, S peech: Listen for slurred speech, T kit: Call 9-1-1 right away   Is the patient/caregiver able to teach back the risk factors for a  stroke? High blood pressure-goal below 120/80, High Cholesterol   Is the patient/caregiver able to teach back signs and symptoms related to disease process for when to call PCP? Yes   Is the patient/caregiver able to teach back signs and symptoms related to disease process for when to call 911? Yes   If the patient is a current smoker, are they able to teach back resources for cessation? Not a smoker   Is the patient/caregiver able to teach back the hierarchy of who to call/visit for symptoms/problems? PCP, Specialist, Home health nurse, Urgent Care, ED, 911 Yes   Additional teach back comments Watchman procedure on 29th of December. Says she has been out raking leaves today.   Week 2 call completed? Yes   Wrap up additional comments No questions or issues at this time.          ROLA URBINA - Registered Nurse

## 2022-11-11 ENCOUNTER — ANTICOAGULATION VISIT (OUTPATIENT)
Dept: CARDIOLOGY | Facility: CLINIC | Age: 77
End: 2022-11-11

## 2022-11-11 DIAGNOSIS — Z79.01 LONG TERM (CURRENT) USE OF ANTICOAGULANTS: Primary | ICD-10-CM

## 2022-11-11 LAB — INR PPP: 3

## 2022-11-17 RX ORDER — WARFARIN SODIUM 5 MG/1
5 TABLET ORAL 2 TIMES WEEKLY
COMMUNITY
End: 2022-12-27 | Stop reason: DRUGHIGH

## 2022-11-17 RX ORDER — WARFARIN SODIUM 2.5 MG/1
2.5 TABLET ORAL TAKE AS DIRECTED
COMMUNITY
End: 2022-12-27 | Stop reason: DRUGHIGH

## 2022-11-18 ENCOUNTER — ANESTHESIA (OUTPATIENT)
Dept: CARDIOLOGY | Facility: HOSPITAL | Age: 77
End: 2022-11-18

## 2022-11-18 ENCOUNTER — ANESTHESIA EVENT (OUTPATIENT)
Dept: CARDIOLOGY | Facility: HOSPITAL | Age: 77
End: 2022-11-18

## 2022-11-18 ENCOUNTER — HOSPITAL ENCOUNTER (OUTPATIENT)
Dept: CARDIOLOGY | Facility: HOSPITAL | Age: 77
Discharge: HOME | End: 2022-11-18

## 2022-11-18 ENCOUNTER — HOSPITAL ENCOUNTER (OUTPATIENT)
Facility: HOSPITAL | Age: 77
Setting detail: HOSPITAL OUTPATIENT SURGERY
Discharge: HOME | End: 2022-11-18
Attending: INTERNAL MEDICINE | Admitting: INTERNAL MEDICINE

## 2022-11-18 VITALS
DIASTOLIC BLOOD PRESSURE: 74 MMHG | HEIGHT: 62 IN | TEMPERATURE: 97.5 F | RESPIRATION RATE: 10 BRPM | HEART RATE: 82 BPM | BODY MASS INDEX: 22.43 KG/M2 | OXYGEN SATURATION: 97 % | WEIGHT: 121.91 LBS | SYSTOLIC BLOOD PRESSURE: 155 MMHG

## 2022-11-18 DIAGNOSIS — I48.20 ATRIAL FIBRILLATION, CHRONIC: ICD-10-CM

## 2022-11-18 DIAGNOSIS — Z86.73 HISTORY OF CVA (CEREBROVASCULAR ACCIDENT): ICD-10-CM

## 2022-11-18 DIAGNOSIS — Z79.01 LONG TERM (CURRENT) USE OF ANTICOAGULANTS: ICD-10-CM

## 2022-11-18 LAB
ANION GAP SERPL CALCULATED.3IONS-SCNC: 17 MMOL/L (ref 5–15)
BASOPHILS # BLD AUTO: 0.1 10*3/MM3 (ref 0–0.2)
BASOPHILS NFR BLD AUTO: 0.9 % (ref 0–1.5)
BUN SERPL-MCNC: 28 MG/DL (ref 8–23)
BUN/CREAT SERPL: 25 (ref 7–25)
CALCIUM SPEC-SCNC: 9.6 MG/DL (ref 8.6–10.5)
CHLORIDE SERPL-SCNC: 105 MMOL/L (ref 98–107)
CO2 SERPL-SCNC: 22 MMOL/L (ref 22–29)
CREAT SERPL-MCNC: 1.12 MG/DL (ref 0.57–1)
DEPRECATED RDW RBC AUTO: 45.5 FL (ref 37–54)
EGFRCR SERPLBLD CKD-EPI 2021: 51.4 ML/MIN/1.73
EOSINOPHIL # BLD AUTO: 0 10*3/MM3 (ref 0–0.4)
EOSINOPHIL NFR BLD AUTO: 0.2 % (ref 0.3–6.2)
ERYTHROCYTE [DISTWIDTH] IN BLOOD BY AUTOMATED COUNT: 13.8 % (ref 12.3–15.4)
GLUCOSE SERPL-MCNC: 69 MG/DL (ref 65–99)
HCT VFR BLD AUTO: 42 % (ref 34–46.6)
HGB BLD-MCNC: 13.3 G/DL (ref 12–15.9)
INR PPP: 2.49 (ref 2–3)
LYMPHOCYTES # BLD AUTO: 0.6 10*3/MM3 (ref 0.7–3.1)
LYMPHOCYTES NFR BLD AUTO: 7.9 % (ref 19.6–45.3)
MCH RBC QN AUTO: 30 PG (ref 26.6–33)
MCHC RBC AUTO-ENTMCNC: 31.6 G/DL (ref 31.5–35.7)
MCV RBC AUTO: 95.1 FL (ref 79–97)
MONOCYTES # BLD AUTO: 0.4 10*3/MM3 (ref 0.1–0.9)
MONOCYTES NFR BLD AUTO: 5 % (ref 5–12)
NEUTROPHILS NFR BLD AUTO: 6.4 10*3/MM3 (ref 1.7–7)
NEUTROPHILS NFR BLD AUTO: 86 % (ref 42.7–76)
NRBC BLD AUTO-RTO: 0 /100 WBC (ref 0–0.2)
PLATELET # BLD AUTO: 314 10*3/MM3 (ref 140–450)
PMV BLD AUTO: 7.4 FL (ref 6–12)
POTASSIUM SERPL-SCNC: 5.1 MMOL/L (ref 3.5–5.2)
PROTHROMBIN TIME: 24.4 SECONDS (ref 19.4–28.5)
RBC # BLD AUTO: 4.42 10*6/MM3 (ref 3.77–5.28)
SODIUM SERPL-SCNC: 144 MMOL/L (ref 136–145)
WBC NRBC COR # BLD: 7.5 10*3/MM3 (ref 3.4–10.8)

## 2022-11-18 PROCEDURE — 85610 PROTHROMBIN TIME: CPT | Performed by: NURSE PRACTITIONER

## 2022-11-18 PROCEDURE — 93355 ECHO TRANSESOPHAGEAL (TEE): CPT

## 2022-11-18 PROCEDURE — 33340 PERQ CLSR TCAT L ATR APNDGE: CPT | Performed by: INTERNAL MEDICINE

## 2022-11-18 PROCEDURE — 93355 ECHO TRANSESOPHAGEAL (TEE): CPT | Performed by: INTERNAL MEDICINE

## 2022-11-18 PROCEDURE — 25010000002 ONDANSETRON PER 1 MG: Performed by: STUDENT IN AN ORGANIZED HEALTH CARE EDUCATION/TRAINING PROGRAM

## 2022-11-18 PROCEDURE — 85025 COMPLETE CBC W/AUTO DIFF WBC: CPT | Performed by: NURSE PRACTITIONER

## 2022-11-18 PROCEDURE — 80048 BASIC METABOLIC PNL TOTAL CA: CPT | Performed by: NURSE PRACTITIONER

## 2022-11-18 PROCEDURE — C1893 INTRO/SHEATH, FIXED,NON-PEEL: HCPCS | Performed by: INTERNAL MEDICINE

## 2022-11-18 PROCEDURE — 25010000002 PROPOFOL 10 MG/ML EMULSION: Performed by: NURSE ANESTHETIST, CERTIFIED REGISTERED

## 2022-11-18 RX ORDER — DIPHENHYDRAMINE HYDROCHLORIDE 50 MG/ML
12.5 INJECTION INTRAMUSCULAR; INTRAVENOUS ONCE AS NEEDED
Status: DISCONTINUED | OUTPATIENT
Start: 2022-11-18 | End: 2022-11-18 | Stop reason: HOSPADM

## 2022-11-18 RX ORDER — HYDRALAZINE HYDROCHLORIDE 20 MG/ML
5 INJECTION INTRAMUSCULAR; INTRAVENOUS
Status: DISCONTINUED | OUTPATIENT
Start: 2022-11-18 | End: 2022-11-18 | Stop reason: HOSPADM

## 2022-11-18 RX ORDER — ONDANSETRON 2 MG/ML
INJECTION INTRAMUSCULAR; INTRAVENOUS AS NEEDED
Status: DISCONTINUED | OUTPATIENT
Start: 2022-11-18 | End: 2022-11-18 | Stop reason: SURG

## 2022-11-18 RX ORDER — ACETAMINOPHEN 650 MG/1
650 SUPPOSITORY RECTAL ONCE AS NEEDED
Status: DISCONTINUED | OUTPATIENT
Start: 2022-11-18 | End: 2022-11-18 | Stop reason: HOSPADM

## 2022-11-18 RX ORDER — ATENOLOL 50 MG/1
50 TABLET ORAL ONCE
Status: DISCONTINUED | OUTPATIENT
Start: 2022-11-18 | End: 2022-11-18 | Stop reason: HOSPADM

## 2022-11-18 RX ORDER — FENTANYL CITRATE 50 UG/ML
25 INJECTION, SOLUTION INTRAMUSCULAR; INTRAVENOUS
Status: DISCONTINUED | OUTPATIENT
Start: 2022-11-18 | End: 2022-11-18 | Stop reason: HOSPADM

## 2022-11-18 RX ORDER — SODIUM CHLORIDE 0.9 % (FLUSH) 0.9 %
10 SYRINGE (ML) INJECTION EVERY 12 HOURS SCHEDULED
Status: DISCONTINUED | OUTPATIENT
Start: 2022-11-18 | End: 2022-11-18 | Stop reason: HOSPADM

## 2022-11-18 RX ORDER — SODIUM CHLORIDE 0.9 % (FLUSH) 0.9 %
10 SYRINGE (ML) INJECTION AS NEEDED
Status: DISCONTINUED | OUTPATIENT
Start: 2022-11-18 | End: 2022-11-18 | Stop reason: HOSPADM

## 2022-11-18 RX ORDER — LOSARTAN POTASSIUM 50 MG/1
50 TABLET ORAL
Status: DISCONTINUED | OUTPATIENT
Start: 2022-11-18 | End: 2022-11-18

## 2022-11-18 RX ORDER — ROCURONIUM BROMIDE 10 MG/ML
INJECTION, SOLUTION INTRAVENOUS AS NEEDED
Status: DISCONTINUED | OUTPATIENT
Start: 2022-11-18 | End: 2022-11-18 | Stop reason: SURG

## 2022-11-18 RX ORDER — ATENOLOL 50 MG/1
50 TABLET ORAL
Status: DISCONTINUED | OUTPATIENT
Start: 2022-11-18 | End: 2022-11-18

## 2022-11-18 RX ORDER — PROPOFOL 10 MG/ML
VIAL (ML) INTRAVENOUS AS NEEDED
Status: DISCONTINUED | OUTPATIENT
Start: 2022-11-18 | End: 2022-11-18 | Stop reason: SURG

## 2022-11-18 RX ORDER — ONDANSETRON 2 MG/ML
4 INJECTION INTRAMUSCULAR; INTRAVENOUS ONCE AS NEEDED
Status: DISCONTINUED | OUTPATIENT
Start: 2022-11-18 | End: 2022-11-18 | Stop reason: HOSPADM

## 2022-11-18 RX ORDER — LOSARTAN POTASSIUM 50 MG/1
50 TABLET ORAL ONCE
Status: DISCONTINUED | OUTPATIENT
Start: 2022-11-18 | End: 2022-11-18 | Stop reason: HOSPADM

## 2022-11-18 RX ORDER — PROMETHAZINE HYDROCHLORIDE 25 MG/1
25 TABLET ORAL ONCE AS NEEDED
Status: DISCONTINUED | OUTPATIENT
Start: 2022-11-18 | End: 2022-11-18 | Stop reason: HOSPADM

## 2022-11-18 RX ORDER — ACETAMINOPHEN 325 MG/1
650 TABLET ORAL ONCE AS NEEDED
Status: DISCONTINUED | OUTPATIENT
Start: 2022-11-18 | End: 2022-11-18 | Stop reason: HOSPADM

## 2022-11-18 RX ORDER — PROMETHAZINE HYDROCHLORIDE 25 MG/1
25 SUPPOSITORY RECTAL ONCE AS NEEDED
Status: DISCONTINUED | OUTPATIENT
Start: 2022-11-18 | End: 2022-11-18 | Stop reason: HOSPADM

## 2022-11-18 RX ORDER — SODIUM CHLORIDE 9 MG/ML
INJECTION, SOLUTION INTRAVENOUS CONTINUOUS PRN
Status: DISCONTINUED | OUTPATIENT
Start: 2022-11-18 | End: 2022-11-18 | Stop reason: SURG

## 2022-11-18 RX ORDER — LABETALOL HYDROCHLORIDE 5 MG/ML
5 INJECTION, SOLUTION INTRAVENOUS
Status: DISCONTINUED | OUTPATIENT
Start: 2022-11-18 | End: 2022-11-18 | Stop reason: HOSPADM

## 2022-11-18 RX ADMIN — ONDANSETRON 4 MG: 2 INJECTION INTRAMUSCULAR; INTRAVENOUS at 15:49

## 2022-11-18 RX ADMIN — ROCURONIUM BROMIDE 50 MG: 10 INJECTION, SOLUTION INTRAVENOUS at 14:52

## 2022-11-18 RX ADMIN — LIDOCAINE HYDROCHLORIDE 100 MG: 20 INJECTION, SOLUTION INTRAVENOUS at 14:52

## 2022-11-18 RX ADMIN — SODIUM CHLORIDE: 0.9 INJECTION, SOLUTION INTRAVENOUS at 14:47

## 2022-11-18 RX ADMIN — PROPOFOL 120 MG: 10 INJECTION, EMULSION INTRAVENOUS at 14:52

## 2022-11-18 RX ADMIN — SUGAMMADEX 200 MG: 100 INJECTION, SOLUTION INTRAVENOUS at 15:49

## 2022-11-18 NOTE — ANESTHESIA PROCEDURE NOTES
Airway  Urgency: elective    Date/Time: 11/18/2022 2:52 PM  Difficult airway    General Information and Staff    Patient location during procedure: OR  CRNA/CAA: Lissy Hutchinson CRNA    Indications and Patient Condition  Indications for airway management: airway protection    Preoxygenated: yes  MILS maintained throughout  Mask difficulty assessment: 1 - vent by mask    Final Airway Details  Final airway type: endotracheal airway      Successful airway: ETT  Cuffed: yes   Successful intubation technique: direct laryngoscopy  Facilitating devices/methods: intubating stylet and cricoid pressure  Endotracheal tube insertion site: oral  Blade: Brown  Blade size: 2  ETT size (mm): 7.0  Cormack-Lehane Classification: grade IIa - partial view of glottis  Placement verified by: chest auscultation and capnometry   Cuff volume (mL): 6  Measured from: lips  ETT/EBT  to lips (cm): 21  Number of attempts at approach: 1  Assessment: lips, teeth, and gum same as pre-op and atraumatic intubation

## 2022-11-18 NOTE — ANESTHESIA PREPROCEDURE EVALUATION
Anesthesia Evaluation     Patient summary reviewed and Nursing notes reviewed   NPO Solid Status: > 8 hours  NPO Liquid Status: > 8 hours           Airway   Mallampati: II  TM distance: >3 FB  Neck ROM: full  No difficulty expected  Dental - normal exam     Pulmonary - negative pulmonary ROS and normal exam   Cardiovascular - normal exam    ECG reviewed    (+) hypertension, valvular problems/murmurs, dysrhythmias Atrial Fib, hyperlipidemia,       Neuro/Psych  (+) TIA, CVA,    GI/Hepatic/Renal/Endo - negative ROS     Musculoskeletal (-) negative ROS    Abdominal  - normal exam    Bowel sounds: normal.   Substance History - negative use     OB/GYN negative ob/gyn ROS         Other        ROS/Med Hx Other: AF     ? Estimated right ventricular systolic pressure from tricuspid regurgitation is normal (<35 mmHg).  ? Estimated left ventricular EF = 60% Left ventricular systolic function is normal.                         Anesthesia Plan    ASA 3     MAC     intravenous induction     Anesthetic plan, risks, benefits, and alternatives have been provided, discussed and informed consent has been obtained with: patient.  Pre-procedure education provided  Plan discussed with CRNA.        CODE STATUS:

## 2022-11-18 NOTE — ANESTHESIA POSTPROCEDURE EVALUATION
Patient: Melody Hernandez    Procedure Summary     Date: 11/18/22 Room / Location: [place] CATH LAB 3 / [facility] CATH INVASIVE LOCATION    Anesthesia Start: 1447 Anesthesia Stop: 1600    Procedure: Atrial Appendage Occlusion Watchman Group aware Diagnosis:       Atrial fibrillation, chronic (HCC)      Long term (current) use of anticoagulants      History of CVA (cerebrovascular accident)    Providers: Demian Damon MD Provider: Andrey Hernandez MD    Anesthesia Type: MAC ASA Status: 3          Anesthesia Type: MAC    Vitals  Vitals Value Taken Time   /88 11/18/22 1623   Temp 97 °F (36.1 °C) 11/18/22 1600   Pulse 80 11/18/22 1626   Resp 13 11/18/22 1615   SpO2 100 % 11/18/22 1626   Vitals shown include unvalidated device data.        Post Anesthesia Care and Evaluation    Patient location during evaluation: PACU  Patient participation: complete - patient participated  Level of consciousness: awake  Pain score: 0  Pain management: adequate    Airway patency: patent  Anesthetic complications: No anesthetic complications  PONV Status: none  Cardiovascular status: acceptable  Respiratory status: acceptable  Hydration status: acceptable    Comments: Patient seen and examined postoperatively; vital signs stable; SpO2 greater than or equal to 90%; cardiopulmonary status stable; nausea/vomiting adequately controlled; pain adequately controlled; no apparent anesthesia complications; patient discharged from anesthesia care when discharge criteria were met

## 2022-11-25 LAB — INR PPP: 2.8

## 2022-11-26 LAB
BH CV ECHO MEAS - TR MAX PG: 17.6 MMHG
BH CV ECHO MEAS - TR MAX VEL: 209.9 CM/SEC
MAXIMAL PREDICTED HEART RATE: 145 BPM
STRESS TARGET HR: 123 BPM

## 2022-11-28 ENCOUNTER — ANTICOAGULATION VISIT (OUTPATIENT)
Dept: CARDIOLOGY | Facility: CLINIC | Age: 77
End: 2022-11-28

## 2022-11-28 DIAGNOSIS — Z79.01 LONG TERM (CURRENT) USE OF ANTICOAGULANTS: Primary | ICD-10-CM

## 2022-12-09 ENCOUNTER — ANTICOAGULATION VISIT (OUTPATIENT)
Dept: CARDIOLOGY | Facility: CLINIC | Age: 77
End: 2022-12-09

## 2022-12-09 DIAGNOSIS — Z79.01 LONG TERM (CURRENT) USE OF ANTICOAGULANTS: Primary | ICD-10-CM

## 2022-12-09 LAB — INR PPP: 7.1

## 2022-12-09 NOTE — PROGRESS NOTES
Pts INR was high today at 7.1. She has had flu and GI bug for past few weeks and has not been eating well. She will hold Warfarin for 3 days and recheck on Monday. Opal

## 2022-12-12 ENCOUNTER — ANTICOAGULATION VISIT (OUTPATIENT)
Dept: CARDIOLOGY | Facility: CLINIC | Age: 77
End: 2022-12-12

## 2022-12-12 DIAGNOSIS — Z79.01 LONG TERM (CURRENT) USE OF ANTICOAGULANTS: Primary | ICD-10-CM

## 2022-12-12 LAB — INR PPP: 2.8

## 2022-12-19 ENCOUNTER — ANTICOAGULATION VISIT (OUTPATIENT)
Dept: CARDIOLOGY | Facility: CLINIC | Age: 77
End: 2022-12-19

## 2022-12-19 DIAGNOSIS — Z79.01 LONG TERM (CURRENT) USE OF ANTICOAGULANTS: Primary | ICD-10-CM

## 2022-12-19 LAB — INR PPP: 3.6

## 2022-12-27 ENCOUNTER — ANTICOAGULATION VISIT (OUTPATIENT)
Dept: CARDIOLOGY | Facility: CLINIC | Age: 77
End: 2022-12-27

## 2022-12-27 ENCOUNTER — TELEPHONE (OUTPATIENT)
Dept: CARDIOLOGY | Facility: CLINIC | Age: 77
End: 2022-12-27

## 2022-12-27 DIAGNOSIS — Z95.2 H/O MITRAL VALVE REPLACEMENT WITH MECHANICAL VALVE: ICD-10-CM

## 2022-12-27 DIAGNOSIS — Z79.01 LONG TERM (CURRENT) USE OF ANTICOAGULANTS: Primary | ICD-10-CM

## 2022-12-27 LAB — INR PPP: 4.9

## 2022-12-27 RX ORDER — WARFARIN SODIUM 2.5 MG/1
TABLET ORAL
Qty: 30 TABLET | Refills: 0 | Status: SHIPPED | OUTPATIENT
Start: 2022-12-27

## 2022-12-27 NOTE — TELEPHONE ENCOUNTER
Caller: Melody Hernandez    Relationship to patient: Self    Best call back number: [phone]    Patient is needing: PT IS NEEDING TO SEE WHAT DOSAGE TO TAKE FOR HER PROTIME. SHE STATED THAT IT WAS ALL OVER THE PLACE ON 12.26.22. GENERALLY HER INR IS SUPPOSED TO BE KEPT AT 3-3.5, BUT YESTERDAY IT WAS RUNNING AT 4.9. PLEASE ADVISE ON DOSAGE, THANK YOU.

## 2023-01-02 LAB — INR PPP: 2.4

## 2023-01-03 ENCOUNTER — ANTICOAGULATION VISIT (OUTPATIENT)
Dept: CARDIOLOGY | Facility: CLINIC | Age: 78
End: 2023-01-03
Payer: MEDICARE

## 2023-01-03 DIAGNOSIS — Z79.01 LONG TERM (CURRENT) USE OF ANTICOAGULANTS: Primary | ICD-10-CM

## 2023-01-03 NOTE — PROGRESS NOTES
Encounter Date:01/04/2023    Last seen-11/10/2022      Patient ID: Melody Hernandez is a 77 y.o. female.    Chief Complaint:  Status post mitral valve replacement (mechanical  Anticoagulation management.  History of stroke  Hypertension        History of present illness  Patient recently was admitted with recurrent TIA and expressive aphasia that has improved.  Patient is being evaluated and scheduled for watchman procedure 12/29/2022  Patient had pre watchman DRAKE that revealed significantly enlarged left atrial appendage and watchman procedure was not performed.    Left ventricular systolic function is normal. Left ventricular ejection fraction appears to be 56 - 60%.  •  There is a mechanical mitral valve prosthesis present.  •  Watchman device patient procedure was aborted because of very dilated left atrial appendage.     Since I have last seen, the patient has been without any chest discomfort ,shortness of breath, palpitations, dizziness or syncope.  Denies having any headache ,abdominal pain ,nausea, vomiting , diarrhea constipation, loss of weight or loss of appetite.  Denies having any excessive bruising ,hematuria or blood in the stool.     Review of all systems negative except as indicated.     Reviewed ROS.     Assessment and plan     ///////////////////  Impression  ==============  -Recent recurrent TIA with expressive aphasia-improved.     -Recent left arm tingling and numbness and difficulty grasping pressure improved.  Clinically patient has right higher parietal small stroke.  CT angiogram was negative.  MRI could not be performed due to mechanical mitral valve replacement      - Status post mitral valve replacement with [place] mechanical heart valve and pulmonary vein isolation for atrial fibrillation May 2010 (patient had history of significant mitral valve prolapse and mitral regurgitation).     -   Persistent and chronic atrial fibrillation . Status post electrical cardioversion 05/24/2013  and 05/13/2016.   Patient is in atrial fibrillation today.     -moderate tricuspid regurgitation     - left bundle-branch block    DRAKE Dr. Calloway- 11/26/2022 revealed  Left ventricular systolic function is normal. Left ventricular ejection fraction appears to be 56 - 60%.  •  There is a mechanical mitral valve prosthesis present.  •  Watchman device patient procedure was aborted because of very dilated left atrial appendage.       Transesophageal echocardiogram 10/24/2022  Mitral mechanical valve is structurally normal with mild mitral regurgitation.  Mild to moderate tricuspid regurgitation is present with calculated pulmonary artery pressure was 26 mmHg.  Severe left atrial enlargement as well as left atrial appendage enlargement without definite clot.  However, severe smoking effect is present in the left atrium and left atrial appendage.  Left ventricle is normal in size and contractility with ejection fraction of 60%.  Aortic intimal thickening is present without clot.     Echocardiogram-8/23/2022 revealed mild tricuspid regurgitation normally functioning mitral prosthetic valve and normal left ventricle function.  Left atrial enlargement.  Lexiscan test-normal- 8/23/2022     Lexiscan Cardiolite test-normal 6/25/2020  Echocardiogram 6/25/2020 revealed biatrial enlargement structurally and functionally normal mitral prosthetic valve moderate tricuspid regurgitation.  Left ventricle ejection fraction is 60%.-  Exertional shortness of breath and fatigue  -stable and improved     Lexiscan Cardiolite test is negative for myocardial ischemia 10/25/2016.  Echocardiogram 10/25/2016 revealed normally functioning prosthetic mitral valve biatrial enlargement moderate mitral regurgitation and thickened aortic valve.     - Dyslipidemia and hypertension      - Smoker      - Family history of coronary artery disease  .   - allergy to Cipro and codeine     -history of intolerance to  amiodarone.  ============  Plan  ================  Patient recently was admitted with recurrent TIA and expressive aphasia that has improved.  Patient is being evaluated and scheduled for watchman procedure 12/29/2022  Patient had pre watchman DRAKE that revealed significantly enlarged left atrial appendage and watchman procedure was not performed.-Dr. Calloway-11/22/2022    Left ventricular systolic function is normal. Left ventricular ejection fraction appears to be 56 - 60%.  •  There is a mechanical mitral valve prosthesis present.  •  Watchman device patient procedure was aborted because of very dilated left atrial appendage.      History of expressive aphasia.-Resolved.  Recurrent TIA.  With left arm tingling and numbness and difficulty grasping pressure improved.  Clinically patient has right higher parietal small stroke.  CT angiogram was negative.  MRI could not be performed due to mechanical mitral valve replacement      Status post mechanical mitral valve replacement  EKG showed atrial fibrillation with controlled ventricular response      Anticoagulation status reviewed.  INR 2.4- 1/2/2023  Goal is still maintain INR around 3.     Renal dysfunction  BUN/creatinine-19/1.42     Dyslipidemia-continue atorvastatin     Hypertension-  134/84  Blood pressure is running normal at home.  Patient was advised to take clonidine if blood pressure is higher than 160.  However she has been having dizziness when she takes clonidine.  Patient is on losartan 50 mg a day.  I have asked her to take extra half a tablet of losartan if blood pressure is more than 160.     Medications were reviewed and updated.     Follow-up in the office in 6 months.     Further plan will depend on patient's progress.  [[[[[[[[[[[[[[[[[[[[[[[[[                   Diagnosis Plan   1. Long term (current) use of anticoagulants        2. H/O mitral valve replacement with mechanical valve        3. Atrial fibrillation, chronic (HCC)        4. History of  CVA (cerebrovascular accident)        5. Dyslipidemia        6. S/P MVR (mitral valve replacement)        7. Essential hypertension        8. Mild aphasia        9. Cerebrovascular accident (CVA), unspecified mechanism (HCC)        10. History of mitral valve replacement        11. Cerebrovascular accident (CVA) due to embolism of cerebral artery (HCC)        LAB RESULTS (LAST 7 DAYS)    CBC        BMP        CMP         BNP        TROPONIN        CoAg  Results from last 7 days   Lab Units 01/02/23  0000   INR  2.40       Creatinine Clearance  CrCl cannot be calculated (Patient's most recent lab result is older than the maximum 30 days allowed.).    ABG        Radiology  No radiology results for the last day                The following portions of the patient's history were reviewed and updated as appropriate: allergies, current medications, past family history, past medical history, past social history, past surgical history and problem list.    Review of Systems   Constitutional: Negative for malaise/fatigue.   Cardiovascular: Negative for chest pain, leg swelling, palpitations and syncope.   Respiratory: Negative for shortness of breath.    Skin: Negative for rash.   Gastrointestinal: Negative for nausea and vomiting.   Neurological: Negative for dizziness, light-headedness and numbness.   All other systems reviewed and are negative.        Current Outpatient Medications:   •  acetaminophen (TYLENOL) 325 MG tablet, Take 2 tablets by mouth Every 4 (Four) Hours As Needed for Mild Pain., Disp: , Rfl:   •  aspirin (aspirin) 81 MG EC tablet, Take 1 tablet by mouth Daily., Disp: , Rfl:   •  atenolol (TENORMIN) 100 MG tablet, Take 50 mg by mouth 2 (Two) Times a Day., Disp: , Rfl:   •  atorvastatin (LIPITOR) 80 MG tablet, Take 1 tablet by mouth Daily., Disp: 30 tablet, Rfl: 1  •  cloNIDine (CATAPRES) 0.1 MG tablet, Take 1 tablet by mouth 3 (Three) Times a Day As Needed for High Blood Pressure (SBP > 150)., Disp: 90  tablet, Rfl: 1  •  losartan (COZAAR) 50 MG tablet, Take 1 tablet by mouth Daily., Disp: 90 tablet, Rfl: 1  •  warfarin (COUMADIN) 2.5 MG tablet, Take one half tablet by mouth on Monday and Friday and one tablet by mouth all other days or as directed, Disp: 30 tablet, Rfl: 0    Allergies   Allergen Reactions   • Ciprofloxacin Hives   • Codeine GI Intolerance       Family History   Problem Relation Age of Onset   • Heart disease Mother    • Heart attack Mother    • Hypertension Mother        Past Surgical History:   Procedure Laterality Date   • APPENDECTOMY     • ATRIAL APPENDAGE EXCLUSION LEFT WITH TRANSESOPHAGEAL ECHOCARDIOGRAM N/A 2022    Procedure: Atrial Appendage Occlusion Watchman Group aware;  Surgeon: Demian Damon MD;  Location: [facility] INVASIVE LOCATION;  Service: Cardiovascular;  Laterality: N/A;   • CARDIOVERSION     • CHOLECYSTECTOMY     • MITRAL VALVE REPLACEMENT  2010    Mechanical    • TONSILLECTOMY         Past Medical History:   Diagnosis Date   • Atrial fibrillation (HCC)    • Hyperlipidemia    • Hypertension    • Mitral valve prolapse    • Stroke (HCC)    • TIA (transient ischemic attack)        Family History   Problem Relation Age of Onset   • Heart disease Mother    • Heart attack Mother    • Hypertension Mother        Social History     Socioeconomic History   • Marital status:    Tobacco Use   • Smoking status: Former     Types: Cigarettes     Quit date:      Years since quittin.0   • Smokeless tobacco: Never   Vaping Use   • Vaping Use: Never used   Substance and Sexual Activity   • Alcohol use: Yes     Comment: social   • Drug use: Never   • Sexual activity: Defer         Procedures      Objective:       Physical Exam    /84 (BP Location: Left arm, Patient Position: Sitting, Cuff Size: Adult)   Pulse 56   Ht 157.5 cm (62\")   Wt 58.5 kg (129 lb)   SpO2 100%   BMI 23.59 kg/m²   The patient is alert, oriented and in no distress.    Vital signs as  noted above.    Head and neck revealed no carotid bruits or jugular venous distension.  No thyromegaly or lymphadenopathy is present.    Lungs clear.  No wheezing.  Breath sounds are normal bilaterally.    Heart normal and crisp heart sounds.  No murmur..  No pericardial rub is present.  No gallop is present.    Abdomen soft and nontender.  No organomegaly is present.    Extremities revealed good peripheral pulses without any pedal edema.    Skin warm and dry.    Musculoskeletal system is grossly normal.    CNS grossly normal.    Reviewed and updated.    Reviewed and updated.

## 2023-01-03 NOTE — PROGRESS NOTES
Pts INR is 2.4 and is close enough to range to continue present dose of Warfarin and recheck in a week. cjRN

## 2023-01-04 ENCOUNTER — OFFICE VISIT (OUTPATIENT)
Dept: CARDIOLOGY | Facility: CLINIC | Age: 78
End: 2023-01-04
Payer: MEDICARE

## 2023-01-04 VITALS
SYSTOLIC BLOOD PRESSURE: 134 MMHG | OXYGEN SATURATION: 100 % | WEIGHT: 129 LBS | BODY MASS INDEX: 23.74 KG/M2 | HEIGHT: 62 IN | HEART RATE: 56 BPM | DIASTOLIC BLOOD PRESSURE: 84 MMHG

## 2023-01-04 DIAGNOSIS — I10 ESSENTIAL HYPERTENSION: ICD-10-CM

## 2023-01-04 DIAGNOSIS — Z86.73 HISTORY OF CVA (CEREBROVASCULAR ACCIDENT): ICD-10-CM

## 2023-01-04 DIAGNOSIS — Z79.01 LONG TERM (CURRENT) USE OF ANTICOAGULANTS: Primary | ICD-10-CM

## 2023-01-04 DIAGNOSIS — E78.5 DYSLIPIDEMIA: ICD-10-CM

## 2023-01-04 DIAGNOSIS — I63.9 CEREBROVASCULAR ACCIDENT (CVA), UNSPECIFIED MECHANISM: ICD-10-CM

## 2023-01-04 DIAGNOSIS — R47.01 MILD APHASIA: ICD-10-CM

## 2023-01-04 DIAGNOSIS — I48.20 ATRIAL FIBRILLATION, CHRONIC: ICD-10-CM

## 2023-01-04 DIAGNOSIS — I63.40 CEREBROVASCULAR ACCIDENT (CVA) DUE TO EMBOLISM OF CEREBRAL ARTERY: ICD-10-CM

## 2023-01-04 DIAGNOSIS — Z95.2 HISTORY OF MITRAL VALVE REPLACEMENT: ICD-10-CM

## 2023-01-04 DIAGNOSIS — Z95.2 H/O MITRAL VALVE REPLACEMENT WITH MECHANICAL VALVE: ICD-10-CM

## 2023-01-04 DIAGNOSIS — Z95.2 S/P MVR (MITRAL VALVE REPLACEMENT): ICD-10-CM

## 2023-01-04 PROCEDURE — 99214 OFFICE O/P EST MOD 30 MIN: CPT | Performed by: INTERNAL MEDICINE

## 2023-01-09 ENCOUNTER — ANTICOAGULATION VISIT (OUTPATIENT)
Dept: CARDIOLOGY | Facility: CLINIC | Age: 78
End: 2023-01-09
Payer: MEDICARE

## 2023-01-09 DIAGNOSIS — Z79.01 LONG TERM (CURRENT) USE OF ANTICOAGULANTS: Primary | ICD-10-CM

## 2023-01-09 LAB — INR PPP: 2.5

## 2023-01-09 NOTE — PROGRESS NOTES
Pts INR was very similier to last week. Goal is 3.0 to 3.5, so take 5 mgs again today, then 2.5 mgs, qd and recheck next Monday. jusRN

## 2023-01-16 ENCOUNTER — ANTICOAGULATION VISIT (OUTPATIENT)
Dept: CARDIOLOGY | Facility: CLINIC | Age: 78
End: 2023-01-16
Payer: MEDICARE

## 2023-01-16 DIAGNOSIS — Z79.01 LONG TERM (CURRENT) USE OF ANTICOAGULANTS: Primary | ICD-10-CM

## 2023-01-16 LAB — INR PPP: 3.2

## 2023-01-26 ENCOUNTER — TELEPHONE (OUTPATIENT)
Dept: CARDIOLOGY | Facility: CLINIC | Age: 78
End: 2023-01-26
Payer: MEDICARE

## 2023-01-26 ENCOUNTER — ANTICOAGULATION VISIT (OUTPATIENT)
Dept: CARDIOLOGY | Facility: CLINIC | Age: 78
End: 2023-01-26
Payer: MEDICARE

## 2023-01-26 DIAGNOSIS — Z79.01 LONG TERM (CURRENT) USE OF ANTICOAGULANTS: Primary | ICD-10-CM

## 2023-01-26 LAB — INR PPP: 3.4

## 2023-01-26 NOTE — TELEPHONE ENCOUNTER
Caller: Melody Hernandez    Relationship: Self    Best call back number: [phone]    What is the best time to reach you: ANYTIME    Who are you requesting to speak with (clinical staff, provider,  specific staff member): CLINICAL        What was the call regarding: INR 4.1    Do you require a callback: YES

## 2023-02-13 ENCOUNTER — ANTICOAGULATION VISIT (OUTPATIENT)
Dept: CARDIOLOGY | Facility: CLINIC | Age: 78
End: 2023-02-13
Payer: MEDICARE

## 2023-02-13 DIAGNOSIS — Z79.01 LONG TERM (CURRENT) USE OF ANTICOAGULANTS: Primary | ICD-10-CM

## 2023-02-13 LAB — INR PPP: 5.6

## 2023-02-20 ENCOUNTER — ANTICOAGULATION VISIT (OUTPATIENT)
Dept: CARDIOLOGY | Facility: CLINIC | Age: 78
End: 2023-02-20
Payer: MEDICARE

## 2023-02-20 DIAGNOSIS — Z79.01 LONG TERM (CURRENT) USE OF ANTICOAGULANTS: Primary | ICD-10-CM

## 2023-02-20 LAB — INR PPP: 3

## 2023-02-27 ENCOUNTER — ANTICOAGULATION VISIT (OUTPATIENT)
Dept: CARDIOLOGY | Facility: CLINIC | Age: 78
End: 2023-02-27
Payer: MEDICARE

## 2023-02-27 DIAGNOSIS — Z79.01 LONG TERM (CURRENT) USE OF ANTICOAGULANTS: Primary | ICD-10-CM

## 2023-02-27 LAB — INR PPP: 3.1

## 2023-03-02 ENCOUNTER — TELEPHONE (OUTPATIENT)
Dept: CARDIOLOGY | Facility: CLINIC | Age: 78
End: 2023-03-02
Payer: MEDICARE

## 2023-03-02 NOTE — TELEPHONE ENCOUNTER
Called spoke with pt. Last to INR's have been WNL. Pt is taking 2.5mg daily INR calendar corrected to reflect that. Advised continue her current dose as that is what her result is based on. Okay to recheck in 2 weeks as contracted vs. Previously instructed 1 week Janae

## 2023-03-02 NOTE — TELEPHONE ENCOUNTER
Caller: Melody Hernandez    Relationship: Self    Best call back number: [phone]    What is the best time to reach you: ANY     Who are you requesting to speak with (clinical staff, provider,  specific staff member): WHOEVER HANDLES INR        What was the call regarding: PT WANTS TO SPEAK TO THE PEOPLE WHO HANDLE INRS REGARDING WHAT MEDICATION SHE SHOULD TAKE.     Do you require a callback: YES

## 2023-03-13 ENCOUNTER — ANTICOAGULATION VISIT (OUTPATIENT)
Dept: CARDIOLOGY | Facility: CLINIC | Age: 78
End: 2023-03-13
Payer: MEDICARE

## 2023-03-13 DIAGNOSIS — Z79.01 LONG TERM (CURRENT) USE OF ANTICOAGULANTS: Primary | ICD-10-CM

## 2023-03-13 LAB — INR PPP: 2.8

## 2023-03-13 NOTE — PROGRESS NOTES
Spoke to pt. She will increase dosage to 5 mgs on Monday with 2.5 all other days and recheck in 2 weeks. Opal

## 2023-03-20 ENCOUNTER — TELEPHONE (OUTPATIENT)
Dept: CARDIOLOGY | Facility: CLINIC | Age: 78
End: 2023-03-20
Payer: MEDICARE

## 2023-03-20 ENCOUNTER — APPOINTMENT (OUTPATIENT)
Dept: CT IMAGING | Facility: HOSPITAL | Age: 78
End: 2023-03-20
Payer: MEDICARE

## 2023-03-20 ENCOUNTER — HOSPITAL ENCOUNTER (EMERGENCY)
Facility: HOSPITAL | Age: 78
Discharge: HOME | End: 2023-03-20
Attending: EMERGENCY MEDICINE | Admitting: EMERGENCY MEDICINE
Payer: MEDICARE

## 2023-03-20 VITALS
WEIGHT: 111.99 LBS | RESPIRATION RATE: 18 BRPM | OXYGEN SATURATION: 99 % | BODY MASS INDEX: 19.84 KG/M2 | SYSTOLIC BLOOD PRESSURE: 164 MMHG | DIASTOLIC BLOOD PRESSURE: 88 MMHG | TEMPERATURE: 97.6 F | HEART RATE: 82 BPM | HEIGHT: 63 IN

## 2023-03-20 DIAGNOSIS — R53.1 WEAKNESS: Primary | ICD-10-CM

## 2023-03-20 LAB
ALBUMIN SERPL-MCNC: 4.3 G/DL (ref 3.5–5.2)
ALBUMIN/GLOB SERPL: 1.6 G/DL
ALP SERPL-CCNC: 79 U/L (ref 39–117)
ALT SERPL W P-5'-P-CCNC: 15 U/L (ref 1–33)
ANION GAP SERPL CALCULATED.3IONS-SCNC: 13 MMOL/L (ref 5–15)
AST SERPL-CCNC: 23 U/L (ref 1–32)
BASOPHILS # BLD AUTO: 0.1 10*3/MM3 (ref 0–0.2)
BASOPHILS NFR BLD AUTO: 1.9 % (ref 0–1.5)
BILIRUB SERPL-MCNC: 1.8 MG/DL (ref 0–1.2)
BUN SERPL-MCNC: 15 MG/DL (ref 8–23)
BUN/CREAT SERPL: 13.5 (ref 7–25)
CALCIUM SPEC-SCNC: 9.3 MG/DL (ref 8.6–10.5)
CHLORIDE SERPL-SCNC: 106 MMOL/L (ref 98–107)
CK SERPL-CCNC: 102 U/L (ref 20–180)
CO2 SERPL-SCNC: 24 MMOL/L (ref 22–29)
CREAT SERPL-MCNC: 1.11 MG/DL (ref 0.57–1)
DEPRECATED RDW RBC AUTO: 48.6 FL (ref 37–54)
EGFRCR SERPLBLD CKD-EPI 2021: 51.6 ML/MIN/1.73
EOSINOPHIL # BLD AUTO: 0.1 10*3/MM3 (ref 0–0.4)
EOSINOPHIL NFR BLD AUTO: 1.1 % (ref 0.3–6.2)
ERYTHROCYTE [DISTWIDTH] IN BLOOD BY AUTOMATED COUNT: 14.3 % (ref 12.3–15.4)
GLOBULIN UR ELPH-MCNC: 2.7 GM/DL
GLUCOSE BLDC GLUCOMTR-MCNC: 80 MG/DL (ref 70–105)
GLUCOSE SERPL-MCNC: 90 MG/DL (ref 65–99)
HCT VFR BLD AUTO: 38.4 % (ref 34–46.6)
HGB BLD-MCNC: 12.6 G/DL (ref 12–15.9)
INR PPP: 3.03 (ref 2–3)
LYMPHOCYTES # BLD AUTO: 1 10*3/MM3 (ref 0.7–3.1)
LYMPHOCYTES NFR BLD AUTO: 19.2 % (ref 19.6–45.3)
MAGNESIUM SERPL-MCNC: 2.1 MG/DL (ref 1.6–2.4)
MCH RBC QN AUTO: 30.2 PG (ref 26.6–33)
MCHC RBC AUTO-ENTMCNC: 32.9 G/DL (ref 31.5–35.7)
MCV RBC AUTO: 91.7 FL (ref 79–97)
MONOCYTES # BLD AUTO: 0.6 10*3/MM3 (ref 0.1–0.9)
MONOCYTES NFR BLD AUTO: 10.6 % (ref 5–12)
NEUTROPHILS NFR BLD AUTO: 3.6 10*3/MM3 (ref 1.7–7)
NEUTROPHILS NFR BLD AUTO: 67.2 % (ref 42.7–76)
NRBC BLD AUTO-RTO: 0.1 /100 WBC (ref 0–0.2)
PLATELET # BLD AUTO: 272 10*3/MM3 (ref 140–450)
PMV BLD AUTO: 7.5 FL (ref 6–12)
POTASSIUM SERPL-SCNC: 4.3 MMOL/L (ref 3.5–5.2)
PROT SERPL-MCNC: 7 G/DL (ref 6–8.5)
PROTHROMBIN TIME: 29.3 SECONDS (ref 19.4–28.5)
RBC # BLD AUTO: 4.18 10*6/MM3 (ref 3.77–5.28)
SODIUM SERPL-SCNC: 143 MMOL/L (ref 136–145)
TROPONIN T SERPL HS-MCNC: 17 NG/L
TSH SERPL DL<=0.05 MIU/L-ACNC: 4.64 UIU/ML (ref 0.27–4.2)
WBC NRBC COR # BLD: 5.3 10*3/MM3 (ref 3.4–10.8)

## 2023-03-20 PROCEDURE — 83735 ASSAY OF MAGNESIUM: CPT | Performed by: NURSE PRACTITIONER

## 2023-03-20 PROCEDURE — 84484 ASSAY OF TROPONIN QUANT: CPT | Performed by: NURSE PRACTITIONER

## 2023-03-20 PROCEDURE — 84443 ASSAY THYROID STIM HORMONE: CPT | Performed by: NURSE PRACTITIONER

## 2023-03-20 PROCEDURE — 82550 ASSAY OF CK (CPK): CPT | Performed by: NURSE PRACTITIONER

## 2023-03-20 PROCEDURE — 93005 ELECTROCARDIOGRAM TRACING: CPT | Performed by: NURSE PRACTITIONER

## 2023-03-20 PROCEDURE — 82962 GLUCOSE BLOOD TEST: CPT

## 2023-03-20 PROCEDURE — 85610 PROTHROMBIN TIME: CPT | Performed by: NURSE PRACTITIONER

## 2023-03-20 PROCEDURE — 85025 COMPLETE CBC W/AUTO DIFF WBC: CPT | Performed by: NURSE PRACTITIONER

## 2023-03-20 PROCEDURE — 99283 EMERGENCY DEPT VISIT LOW MDM: CPT

## 2023-03-20 PROCEDURE — 70450 CT HEAD/BRAIN W/O DYE: CPT

## 2023-03-20 PROCEDURE — 80053 COMPREHEN METABOLIC PANEL: CPT | Performed by: NURSE PRACTITIONER

## 2023-03-20 RX ORDER — SODIUM CHLORIDE 0.9 % (FLUSH) 0.9 %
10 SYRINGE (ML) INJECTION AS NEEDED
Status: DISCONTINUED | OUTPATIENT
Start: 2023-03-20 | End: 2023-03-20 | Stop reason: HOSPADM

## 2023-03-20 NOTE — TELEPHONE ENCOUNTER
Patient has been notified and states that she does not have a car, patient states that she will call her daughter who lives next door to take her to the ER.

## 2023-03-20 NOTE — TELEPHONE ENCOUNTER
Caller: Melody Hernandez    Relationship to patient: Self    Best call back number: [phone]    Patient is needing: PATIENT STATED THAT SHE IS HAS HAD WEAKNESS IN HER RIGHT AND LEFT ARMS. SHE HAS HAD TROUBLE REMEMBERING DIFFERENT SIMPLE THINGS. SHE WOULD LIKE DR. LIZ TO GIVE HER A CALL TO DISCUSS WHAT HE RECOMMENDS. THANK YOU.

## 2023-03-20 NOTE — ED PROVIDER NOTES
"Subjective      Provider in Triage Note  77-year-old female presents with complaint of left hand weakness since awaking at 7 AM.  She reports her last known well was 11 PM when she went to bed last night.  She reports she has had similar episodes in the past.  She denies dropping objects or difficulty with hand grasp.  She states she can really use her left hand fine without any difficulties but it just feels \"off\".  She also reports right parietal head \"fullness\".  Patient is on Coumadin for history of artificial valve and A-fib.          Review of Systems   Constitutional: Negative for fever.   HENT: Negative for congestion.    Eyes: Negative for visual disturbance.   Respiratory: Negative for shortness of breath.    Cardiovascular: Negative for chest pain.   Gastrointestinal: Negative for abdominal pain.   Neurological: Positive for weakness. Negative for dizziness and numbness.   Psychiatric/Behavioral: Negative for confusion.       Past Medical History:   Diagnosis Date   • Atrial fibrillation (HCC)    • Hyperlipidemia    • Hypertension    • Mitral valve prolapse    • Stroke (HCC)    • TIA (transient ischemic attack)        Allergies   Allergen Reactions   • Ciprofloxacin Hives   • Codeine GI Intolerance       Past Surgical History:   Procedure Laterality Date   • APPENDECTOMY     • ATRIAL APPENDAGE EXCLUSION LEFT WITH TRANSESOPHAGEAL ECHOCARDIOGRAM N/A 11/18/2022    Procedure: Atrial Appendage Occlusion Watchman Group aware;  Surgeon: Demian Damon MD;  Location: [facility] INVASIVE LOCATION;  Service: Cardiovascular;  Laterality: N/A;   • CARDIOVERSION     • CHOLECYSTECTOMY     • MITRAL VALVE REPLACEMENT  05/20/2010    Mechanical    • TONSILLECTOMY         Family History   Problem Relation Age of Onset   • Heart disease Mother    • Heart attack Mother    • Hypertension Mother        Social History     Socioeconomic History   • Marital status:    Tobacco Use   • Smoking status: Former     Types: " "Cigarettes     Quit date:      Years since quittin.2   • Smokeless tobacco: Never   Vaping Use   • Vaping Use: Never used   Substance and Sexual Activity   • Alcohol use: Yes     Comment: social   • Drug use: Never   • Sexual activity: Defer       BP (!) 187/98   Pulse 84   Temp 97.9 °F (36.6 °C)   Resp 20   Ht 160 cm (63\")   Wt 50.8 kg (111 lb 15.9 oz)   SpO2 99%   BMI 19.84 kg/m²       Objective   Physical Exam  Vitals and nursing note reviewed.   Constitutional:       Appearance: Normal appearance.   HENT:      Head: Normocephalic and atraumatic.      Mouth/Throat:      Mouth: Mucous membranes are moist.   Eyes:      Pupils: Pupils are equal, round, and reactive to light.   Cardiovascular:      Rate and Rhythm: Normal rate. Rhythm irregular.   Pulmonary:      Effort: Pulmonary effort is normal. No respiratory distress.   Abdominal:      Palpations: Abdomen is soft.      Tenderness: There is no abdominal tenderness.   Skin:     General: Skin is warm and dry.   Neurological:      General: No focal deficit present.      Mental Status: She is alert and oriented to person, place, and time.      Comments: NIH 0         Procedures           ED Course      My interpretation of EKG shows atrial fibrillation, rate of 76, LVH with secondary repolarization abnormality, no ST elevation     Results for orders placed or performed during the hospital encounter of 23   Comprehensive Metabolic Panel    Specimen: Blood   Result Value Ref Range    Glucose 90 65 - 99 mg/dL    BUN 15 8 - 23 mg/dL    Creatinine 1.11 (H) 0.57 - 1.00 mg/dL    Sodium 143 136 - 145 mmol/L    Potassium 4.3 3.5 - 5.2 mmol/L    Chloride 106 98 - 107 mmol/L    CO2 24.0 22.0 - 29.0 mmol/L    Calcium 9.3 8.6 - 10.5 mg/dL    Total Protein 7.0 6.0 - 8.5 g/dL    Albumin 4.3 3.5 - 5.2 g/dL    ALT (SGPT) 15 1 - 33 U/L    AST (SGOT) 23 1 - 32 U/L    Alkaline Phosphatase 79 39 - 117 U/L    Total Bilirubin 1.8 (H) 0.0 - 1.2 mg/dL    Globulin 2.7 " gm/dL    A/G Ratio 1.6 g/dL    BUN/Creatinine Ratio 13.5 7.0 - 25.0    Anion Gap 13.0 5.0 - 15.0 mmol/L    eGFR 51.6 (L) >60.0 mL/min/1.73   Protime-INR    Specimen: Blood   Result Value Ref Range    Protime 29.3 (H) 19.4 - 28.5 Seconds    INR 3.03 (H) 2.00 - 3.00   CBC Auto Differential    Specimen: Blood   Result Value Ref Range    WBC 5.30 3.40 - 10.80 10*3/mm3    RBC 4.18 3.77 - 5.28 10*6/mm3    Hemoglobin 12.6 12.0 - 15.9 g/dL    Hematocrit 38.4 34.0 - 46.6 %    MCV 91.7 79.0 - 97.0 fL    MCH 30.2 26.6 - 33.0 pg    MCHC 32.9 31.5 - 35.7 g/dL    RDW 14.3 12.3 - 15.4 %    RDW-SD 48.6 37.0 - 54.0 fl    MPV 7.5 6.0 - 12.0 fL    Platelets 272 140 - 450 10*3/mm3    Neutrophil % 67.2 42.7 - 76.0 %    Lymphocyte % 19.2 (L) 19.6 - 45.3 %    Monocyte % 10.6 5.0 - 12.0 %    Eosinophil % 1.1 0.3 - 6.2 %    Basophil % 1.9 (H) 0.0 - 1.5 %    Neutrophils, Absolute 3.60 1.70 - 7.00 10*3/mm3    Lymphocytes, Absolute 1.00 0.70 - 3.10 10*3/mm3    Monocytes, Absolute 0.60 0.10 - 0.90 10*3/mm3    Eosinophils, Absolute 0.10 0.00 - 0.40 10*3/mm3    Basophils, Absolute 0.10 0.00 - 0.20 10*3/mm3    nRBC 0.1 0.0 - 0.2 /100 WBC   Single High Sensitivity Troponin T    Specimen: Blood   Result Value Ref Range    HS Troponin T 17 (H) <10 ng/L   Magnesium    Specimen: Blood   Result Value Ref Range    Magnesium 2.1 1.6 - 2.4 mg/dL   TSH    Specimen: Blood   Result Value Ref Range    TSH 4.640 (H) 0.270 - 4.200 uIU/mL   CK    Specimen: Blood   Result Value Ref Range    Creatine Kinase 102 20 - 180 U/L   POC Glucose Once    Specimen: Blood   Result Value Ref Range    Glucose 80 70 - 105 mg/dL   ECG 12 Lead Stroke Evaluation   Result Value Ref Range    QT Interval 421 ms     CT Head Without Contrast Stroke Protocol    Result Date: 3/20/2023  1.No acute intracranial hemorrhage. Calvarium is intact. Electronically Signed: Emre Olea  3/20/2023 5:43 PM EDT  Workstation ID: [id number]                                    Medical Decision  Making  Weakness: acute illness or injury  Amount and/or Complexity of Data Reviewed  Labs: ordered. Decision-making details documented in ED Course.  Radiology: ordered. Decision-making details documented in ED Course.  ECG/medicine tests: ordered and independent interpretation performed. Decision-making details documented in ED Course.      Patient had the above evaluation.  Results were discussed with the patient.  CT head showed no acute intracranial abnormality.  EKG shows no acute ischemia.  Troponin was 17.  She is having no chest pain or shortness of breath.  White blood cell count is normal.  INR is therapeutic at 3.03 with a goal of 2.5-3.5.  CMP is unremarkable.  Patient has a normal neurologic exam.  NIH is 0.  Patient states she is not interested in being admitted since she has already had stroke work-up in the past.  She will be discharged and will follow-up with her primary doctor and cardiologist on outpatient basis.  Patient is agreeable with this plan.        Final diagnoses:   Weakness       ED Disposition  ED Disposition     ED Disposition   Discharge    Condition   Stable    Comment   --             Miguel Angel Mancera MD  [address] [address]  [phone]    Call in 2 days           Medication List      No changes were made to your prescriptions during this visit.          Dinesh Oro MD  03/20/23 [id number]

## 2023-03-22 LAB — QT INTERVAL: 421 MS

## 2023-03-27 ENCOUNTER — ANTICOAGULATION VISIT (OUTPATIENT)
Dept: CARDIOLOGY | Facility: CLINIC | Age: 78
End: 2023-03-27
Payer: MEDICARE

## 2023-03-27 DIAGNOSIS — Z79.01 LONG TERM (CURRENT) USE OF ANTICOAGULANTS: Primary | ICD-10-CM

## 2023-03-27 LAB — INR PPP: 3.1

## 2023-04-10 ENCOUNTER — ANTICOAGULATION VISIT (OUTPATIENT)
Dept: CARDIOLOGY | Facility: CLINIC | Age: 78
End: 2023-04-10
Payer: MEDICARE

## 2023-04-10 DIAGNOSIS — Z79.01 LONG TERM (CURRENT) USE OF ANTICOAGULANTS: Primary | ICD-10-CM

## 2023-04-10 LAB — INR PPP: 3.6

## 2023-04-24 ENCOUNTER — ANTICOAGULATION VISIT (OUTPATIENT)
Dept: CARDIOLOGY | Facility: CLINIC | Age: 78
End: 2023-04-24
Payer: MEDICARE

## 2023-04-24 DIAGNOSIS — Z79.01 LONG TERM (CURRENT) USE OF ANTICOAGULANTS: Primary | ICD-10-CM

## 2023-04-24 LAB — INR PPP: 8

## 2023-04-24 NOTE — PROGRESS NOTES
Spoke to patient, she has COVID pneumonia, started on prednisone and antibiotic today. She has not had an appetite.  She has no active bleeding. Advised her to go to ER if any bleeding. Hold warfarin and recheck on Friday.

## 2023-04-28 ENCOUNTER — ANTICOAGULATION VISIT (OUTPATIENT)
Dept: CARDIOLOGY | Facility: CLINIC | Age: 78
End: 2023-04-28
Payer: MEDICARE

## 2023-04-28 DIAGNOSIS — Z79.01 LONG TERM (CURRENT) USE OF ANTICOAGULANTS: Primary | ICD-10-CM

## 2023-04-28 LAB — INR PPP: 5.9

## 2023-04-28 NOTE — PROGRESS NOTES
Spoke to patient, she is feeling better, ended steroid and antibiotic. Continue to hold warfarin and recheck on Monday.

## 2023-05-01 ENCOUNTER — TELEPHONE (OUTPATIENT)
Dept: CARDIOLOGY | Facility: CLINIC | Age: 78
End: 2023-05-01
Payer: MEDICARE

## 2023-05-01 ENCOUNTER — ANTICOAGULATION VISIT (OUTPATIENT)
Dept: CARDIOLOGY | Facility: CLINIC | Age: 78
End: 2023-05-01
Payer: MEDICARE

## 2023-05-01 DIAGNOSIS — Z79.01 LONG TERM (CURRENT) USE OF ANTICOAGULANTS: Primary | ICD-10-CM

## 2023-05-01 LAB — INR PPP: 2.3

## 2023-05-01 NOTE — TELEPHONE ENCOUNTER
Caller: LUDMILA LEGGETT    Relationship: SELF    Best call back number: [phone]    What is the best time to reach you:ANY    Who are you requesting to speak with (clinical staff, provider,  specific staff member): CLINICAL    Do you know the name of the person who called:PATIENT    What was the call regarding: INR RESULTS 2.3    PATIENT HAS TAKEN WARFARIN  5.0 TODAY  2.5 TOMORROW WARFARIN    PLEASE REACH OUT TO PATIENT.    Do you require a callback: PLEASE CALL AND ADVICE THE PATIENT.

## 2023-05-02 NOTE — PROGRESS NOTES
Spoke to patient, take 5 mg again today and resume schedule. Recheck 2 weeks. Appetite is better and she feels good.

## 2023-05-25 ENCOUNTER — ANTICOAGULATION VISIT (OUTPATIENT)
Dept: CARDIOLOGY | Facility: CLINIC | Age: 78
End: 2023-05-25
Payer: MEDICARE

## 2023-05-25 DIAGNOSIS — Z79.01 LONG TERM (CURRENT) USE OF ANTICOAGULANTS: Primary | ICD-10-CM

## 2023-05-25 LAB — INR PPP: 5.4

## 2023-05-30 ENCOUNTER — ANTICOAGULATION VISIT (OUTPATIENT)
Dept: CARDIOLOGY | Facility: CLINIC | Age: 78
End: 2023-05-30

## 2023-05-30 ENCOUNTER — TELEPHONE (OUTPATIENT)
Dept: CARDIOLOGY | Facility: CLINIC | Age: 78
End: 2023-05-30

## 2023-05-30 DIAGNOSIS — Z79.01 LONG TERM (CURRENT) USE OF ANTICOAGULANTS: Primary | ICD-10-CM

## 2023-05-30 LAB — INR PPP: 3.5

## 2023-05-30 NOTE — TELEPHONE ENCOUNTER
Caller: Melody Hernandez    Relationship: Self    Best call back number:   (PRIMARY)  [phone]    What is the best time to reach you: ANY    Who are you requesting to speak with (clinical staff, provider,  specific staff member): CLINICAL    What was the call regarding: PT WANTED TO RETURN A CALL ABOUT HER INR, WANTED TO SPEAK ABOUT HER INR TEST THAT SHE DID AT HOME, PT STATES IT DID RUN A LITTLE BIT HIGH, ISNT SURE WHAT TO DO. PLEASE GIVE HER A CALL ASAP. INR RESULTS WERE 3.5

## 2023-05-30 NOTE — TELEPHONE ENCOUNTER
INR addressed under anticoag encounter. Decreased warfarin to 1.25mg today then on Monday and Fridays recheck as contracted apLPN

## 2023-06-05 ENCOUNTER — ANTICOAGULATION VISIT (OUTPATIENT)
Dept: CARDIOLOGY | Facility: CLINIC | Age: 78
End: 2023-06-05
Payer: MEDICARE

## 2023-06-05 DIAGNOSIS — Z79.01 LONG TERM (CURRENT) USE OF ANTICOAGULANTS: Primary | ICD-10-CM

## 2023-06-05 LAB — INR PPP: 3.1

## 2023-06-08 ENCOUNTER — TELEPHONE (OUTPATIENT)
Dept: CARDIOLOGY | Facility: CLINIC | Age: 78
End: 2023-06-08
Payer: MEDICARE

## 2023-06-08 NOTE — TELEPHONE ENCOUNTER
Spoke with pt advised continue 1.25 mg on Mon and Fridays 2.5 mg all other days recheck INR on Monday apLPN

## 2023-06-12 ENCOUNTER — ANTICOAGULATION VISIT (OUTPATIENT)
Dept: CARDIOLOGY | Facility: CLINIC | Age: 78
End: 2023-06-12
Payer: MEDICARE

## 2023-06-12 DIAGNOSIS — Z79.01 LONG TERM (CURRENT) USE OF ANTICOAGULANTS: Primary | ICD-10-CM

## 2023-06-12 LAB — INR PPP: 3.4

## 2023-07-24 LAB — INR PPP: 2.3

## 2023-07-26 ENCOUNTER — ANTICOAGULATION VISIT (OUTPATIENT)
Dept: CARDIOLOGY | Facility: CLINIC | Age: 78
End: 2023-07-26
Payer: MEDICARE

## 2023-07-26 DIAGNOSIS — Z79.01 LONG TERM (CURRENT) USE OF ANTICOAGULANTS: Primary | ICD-10-CM

## 2023-08-07 ENCOUNTER — ANTICOAGULATION VISIT (OUTPATIENT)
Dept: CARDIOLOGY | Facility: CLINIC | Age: 78
End: 2023-08-07
Payer: MEDICARE

## 2023-08-07 DIAGNOSIS — Z79.01 LONG TERM (CURRENT) USE OF ANTICOAGULANTS: Primary | ICD-10-CM

## 2023-08-07 LAB — INR PPP: 3.2

## 2023-08-07 NOTE — PROGRESS NOTES
Pt had INR checked at [facility] while visiting his mother in the hospital. INR was 1.6 on Friday, took 5 mgs on Fri and Sat, 2.5 mg on Sun, will take 5 mgs Mon and Tues and recheck again at [facility] on Weds. Opal

## 2023-08-21 LAB — INR PPP: 3.9

## 2023-08-22 ENCOUNTER — ANTICOAGULATION VISIT (OUTPATIENT)
Dept: CARDIOLOGY | Facility: CLINIC | Age: 78
End: 2023-08-22
Payer: MEDICARE

## 2023-08-22 DIAGNOSIS — Z79.01 LONG TERM (CURRENT) USE OF ANTICOAGULANTS: Primary | ICD-10-CM

## 2023-08-22 NOTE — PROGRESS NOTES
Spoke to pt, INR was slightly elevated at 3.9. She held her Warfarin last night. Will continue the 2.5 mgs daily and recheck in a week. Opal

## 2023-08-28 ENCOUNTER — ANTICOAGULATION VISIT (OUTPATIENT)
Dept: CARDIOLOGY | Facility: CLINIC | Age: 78
End: 2023-08-28
Payer: MEDICARE

## 2023-08-28 DIAGNOSIS — Z79.01 LONG TERM (CURRENT) USE OF ANTICOAGULANTS: Primary | ICD-10-CM

## 2023-08-28 LAB — INR PPP: 3

## 2023-09-18 ENCOUNTER — ANTICOAGULATION VISIT (OUTPATIENT)
Dept: CARDIOLOGY | Facility: CLINIC | Age: 78
End: 2023-09-18
Payer: MEDICARE

## 2023-09-18 DIAGNOSIS — Z79.01 LONG TERM (CURRENT) USE OF ANTICOAGULANTS: Primary | ICD-10-CM

## 2023-09-18 LAB — INR PPP: 3.7

## 2023-10-02 ENCOUNTER — ANTICOAGULATION VISIT (OUTPATIENT)
Dept: CARDIOLOGY | Facility: CLINIC | Age: 78
End: 2023-10-02
Payer: MEDICARE

## 2023-10-02 DIAGNOSIS — Z79.01 LONG TERM (CURRENT) USE OF ANTICOAGULANTS: Primary | ICD-10-CM

## 2023-10-02 LAB — INR PPP: 4.3

## 2023-10-02 RX ORDER — ATENOLOL 100 MG/1
50 TABLET ORAL EVERY 12 HOURS SCHEDULED
Qty: 90 TABLET | Refills: 0 | Status: SHIPPED | OUTPATIENT
Start: 2023-10-02

## 2023-10-02 NOTE — TELEPHONE ENCOUNTER
Rx Refill Note  Requested Prescriptions     Pending Prescriptions Disp Refills    atenolol (TENORMIN) 100 MG tablet [Pharmacy Med Name: Atenolol 100 MG Oral Tablet] 90 tablet 0     Sig: Take 1/2 (one-half) tablet by mouth twice daily      Last office visit with prescribing clinician: 7/13/2023   Last telemedicine visit with prescribing clinician: Visit date not found   Next office visit with prescribing clinician: 1/18/2024                         Would you like a call back once the refill request has been completed: [] Yes [] No    If the office needs to give you a call back, can they leave a voicemail: [] Yes [] No    Jimena Florence MA  10/02/23, 08:26 EDT

## 2023-10-16 ENCOUNTER — ANTICOAGULATION VISIT (OUTPATIENT)
Dept: CARDIOLOGY | Facility: CLINIC | Age: 78
End: 2023-10-16
Payer: MEDICARE

## 2023-10-16 DIAGNOSIS — Z79.01 LONG TERM (CURRENT) USE OF ANTICOAGULANTS: Primary | ICD-10-CM

## 2023-10-16 LAB — INR PPP: 2.6

## 2023-10-30 ENCOUNTER — ANTICOAGULATION VISIT (OUTPATIENT)
Dept: CARDIOLOGY | Facility: CLINIC | Age: 78
End: 2023-10-30
Payer: MEDICARE

## 2023-10-30 DIAGNOSIS — Z79.01 LONG TERM (CURRENT) USE OF ANTICOAGULANTS: Primary | ICD-10-CM

## 2023-10-30 LAB — INR PPP: 3

## 2023-11-13 ENCOUNTER — ANTICOAGULATION VISIT (OUTPATIENT)
Dept: CARDIOLOGY | Facility: CLINIC | Age: 78
End: 2023-11-13
Payer: MEDICARE

## 2023-11-13 DIAGNOSIS — Z79.01 LONG TERM (CURRENT) USE OF ANTICOAGULANTS: Primary | ICD-10-CM

## 2023-11-13 LAB — INR PPP: 2.9

## 2023-11-27 ENCOUNTER — ANTICOAGULATION VISIT (OUTPATIENT)
Dept: CARDIOLOGY | Facility: CLINIC | Age: 78
End: 2023-11-27
Payer: MEDICARE

## 2023-11-27 DIAGNOSIS — Z79.01 LONG TERM (CURRENT) USE OF ANTICOAGULANTS: Primary | ICD-10-CM

## 2023-11-27 LAB — INR PPP: 3.9

## 2023-12-11 ENCOUNTER — ANTICOAGULATION VISIT (OUTPATIENT)
Dept: CARDIOLOGY | Facility: CLINIC | Age: 78
End: 2023-12-11
Payer: MEDICARE

## 2023-12-11 DIAGNOSIS — Z79.01 LONG TERM (CURRENT) USE OF ANTICOAGULANTS: Primary | ICD-10-CM

## 2023-12-11 LAB — INR PPP: 2.7

## 2023-12-26 LAB — INR PPP: 4.1

## 2023-12-27 ENCOUNTER — ANTICOAGULATION VISIT (OUTPATIENT)
Dept: CARDIOLOGY | Facility: CLINIC | Age: 78
End: 2023-12-27
Payer: MEDICARE

## 2023-12-27 DIAGNOSIS — Z79.01 LONG TERM (CURRENT) USE OF ANTICOAGULANTS: Primary | ICD-10-CM

## 2023-12-27 NOTE — PROGRESS NOTES
Spoke to patient, has had cough and cold, poor appetite and taking cold medication. She took a half dose on 12/25 and skipped dose yesterday. Advised her to take 1/2 dose today and resume warfarin schedule on 12/28/23. Check INR in 2 weeks.

## 2023-12-29 RX ORDER — ATENOLOL 100 MG/1
50 TABLET ORAL EVERY 12 HOURS SCHEDULED
Qty: 90 TABLET | Refills: 1 | Status: SHIPPED | OUTPATIENT
Start: 2023-12-29

## 2023-12-29 NOTE — TELEPHONE ENCOUNTER
Rx Refill Note  Requested Prescriptions     Pending Prescriptions Disp Refills    atenolol (TENORMIN) 100 MG tablet [Pharmacy Med Name: Atenolol 100 MG Oral Tablet] 90 tablet 0     Sig: Take 1/2 (one-half) tablet by mouth twice daily      Last office visit with prescribing clinician: 7/13/2023   Last telemedicine visit with prescribing clinician: Visit date not found   Next office visit with prescribing clinician: 1/18/2024                         Would you like a call back once the refill request has been completed: [] Yes [] No    If the office needs to give you a call back, can they leave a voicemail: [] Yes [] No    Grace Becerril MA  12/29/23, 09:29 EST

## 2024-01-07 NOTE — PROGRESS NOTES
Encounter Date:01/18/2024    Last seen 7/13/2023      Patient ID: Melody Hernandez is a 78 y.o. female.      Chief Complaint:     Status post mitral valve replacement (mechanical  Anticoagulation management.  History of stroke  Hypertension        History of present illness  Since I have last seen, the patient has been without any chest discomfort ,shortness of breath, palpitations, dizziness or syncope.  Denies having any headache ,abdominal pain ,nausea, vomiting , diarrhea constipation, loss of weight or loss of appetite.  Denies having any excessive bruising ,hematuria or blood in the stool.    Review of all systems negative except as indicated.    Reviewed ROS.    Assessment and plan     ///////////////////  History  ==============  -History of recurrent TIA with expressive aphasia-improved.     -Recent left arm tingling and numbness and difficulty grasping pressure improved.  Clinically patient has right higher parietal small stroke.  CT angiogram was negative.  MRI could not be performed due to mechanical mitral valve replacement      - Status post mitral valve replacement with [employer] mechanical heart valve and pulmonary vein isolation for atrial fibrillation May 2010 (patient had history of significant mitral valve prolapse and mitral regurgitation).     -   Persistent and chronic atrial fibrillation . Status post electrical cardioversion 05/24/2013 and 05/13/2016.   Patient is in atrial fibrillation today.     -moderate tricuspid regurgitation     - left bundle-branch block     Patient recently was admitted with recurrent TIA and expressive aphasia that has improved.  Patient is being evaluated and scheduled for watchman procedure 12/29/2022  Patient had pre watchman DRAKE that revealed significantly enlarged left atrial appendage and watchman procedure was not performed.     Left ventricular systolic function is normal. Left ventricular ejection fraction appears to be 56 - 60%.    There is a mechanical  mitral valve prosthesis present.    Watchman device patient procedure was aborted because of very dilated left atrial appendage.     DRAKE Dr. Calloway- 11/26/2022 revealed  Left ventricular systolic function is normal. Left ventricular ejection fraction appears to be 56 - 60%.    There is a mechanical mitral valve prosthesis present.    Watchman device patient procedure was aborted because of very dilated left atrial appendage.        Transesophageal echocardiogram 10/24/2022  Mitral mechanical valve is structurally normal with mild mitral regurgitation.  Mild to moderate tricuspid regurgitation is present with calculated pulmonary artery pressure was 26 mmHg.  Severe left atrial enlargement as well as left atrial appendage enlargement without definite clot.  However, severe smoking effect is present in the left atrium and left atrial appendage.  Left ventricle is normal in size and contractility with ejection fraction of 60%.  Aortic intimal thickening is present without clot.     Echocardiogram-8/23/2022 revealed mild tricuspid regurgitation normally functioning mitral prosthetic valve and normal left ventricle function.  Left atrial enlargement.  Lexiscan test-normal- 8/23/2022     Lexiscan Cardiolite test-normal 6/25/2020  Echocardiogram 6/25/2020 revealed biatrial enlargement structurally and functionally normal mitral prosthetic valve moderate tricuspid regurgitation.  Left ventricle ejection fraction is 60%.-  Exertional shortness of breath and fatigue  -stable and improved     Lexiscan Cardiolite test is negative for myocardial ischemia 10/25/2016.  Echocardiogram 10/25/2016 revealed normally functioning prosthetic mitral valve biatrial enlargement moderate mitral regurgitation and thickened aortic valve.     - Dyslipidemia and hypertension      - Smoker      - Family history of coronary artery disease  .   - allergy to Cipro and codeine     -history of intolerance to  amiodarone.  ============  Plan  ================  History of recurrent TIA and expressive aphasia that has improved.  No further episodes.  Watchman procedure was considered.  Patient had pre watchman DRAKE that revealed significantly enlarged left atrial appendage and watchman procedure was not performed.-Dr. Calloway-11/22/2022     Left ventricular systolic function is normal. Left ventricular ejection fraction appears to be 56 - 60%.    There is a mechanical mitral valve prosthesis present.    Watchman device patient procedure was aborted because of very dilated left atrial appendage.        History of expressive aphasia.-Resolved.  Recurrent TIA.  With left arm tingling and numbness and difficulty grasping pressure improved.  Clinically patient has right higher parietal small stroke.  CT angiogram was negative.  MRI could not be performed due to mechanical mitral valve replacement      Status post mechanical mitral valve replacement  EKG showed atrial fibrillation with controlled ventricular response      Anticoagulation status reviewed.  INR 2.4- 1/2/2023  Recent INR is running in therapeutic range for mechanical valve.    Renal dysfunction  BUN/creatinine-19/1.42     Dyslipidemia-continue atorvastatin     Hypertension-162/85.  Patient is having back pain and weather issue.  Blood pressure is running normal at home.  Patient was advised to take clonidine if blood pressure is higher than 160.  However she has been having dizziness when she takes clonidine.  Patient is on losartan 50 mg a day.  I have asked her to take extra half a tablet of losartan if blood pressure is more than 160.        Have discussed with Dr. Damon about advisability of larger Watchman device.  Apparently it is not approved yet.  Have discussed about this with patient.    Medications were reviewed and updated.     Follow-up in the office in 6 months.     Further plan will depend on patient's progress.    Reviewed and  updated-1/18/2024  [[[[[[[[[[[[[[[[[[[[[[[[[       Diagnosis Plan   1. Long term (current) use of anticoagulants [Z79.01]        2. History of CVA (cerebrovascular accident)        3. H/O mitral valve replacement with mechanical valve        4. Essential hypertension        5. Atrial fibrillation, chronic        6. S/P MVR (mitral valve replacement)        7. Mild aphasia        8. Cerebrovascular accident (CVA), unspecified mechanism        9. Cerebrovascular accident (CVA) due to embolism of cerebral artery        10. Dyslipidemia        11. History of mitral valve replacement        LAB RESULTS (LAST 7 DAYS)    CBC        BMP        CMP         BNP        TROPONIN        CoAg        Creatinine Clearance  CrCl cannot be calculated (Patient's most recent lab result is older than the maximum 30 days allowed.).    ABG        Radiology  No radiology results for the last day                The following portions of the patient's history were reviewed and updated as appropriate: allergies, current medications, past family history, past medical history, past social history, past surgical history, and problem list.    Review of Systems   Constitutional: Negative for malaise/fatigue.   Cardiovascular:  Negative for chest pain, leg swelling, palpitations and syncope.   Respiratory:  Negative for shortness of breath.    Skin:  Negative for rash.   Gastrointestinal:  Negative for nausea and vomiting.   Neurological:  Negative for dizziness, light-headedness and numbness.   All other systems reviewed and are negative.        Current Outpatient Medications:     acetaminophen (TYLENOL) 325 MG tablet, Take 2 tablets by mouth Every 4 (Four) Hours As Needed for Mild Pain., Disp: , Rfl:     aspirin (aspirin) 81 MG EC tablet, Take 1 tablet by mouth Daily., Disp: , Rfl:     atenolol (TENORMIN) 100 MG tablet, Take 1/2 (one-half) tablet by mouth twice daily, Disp: 90 tablet, Rfl: 1    atorvastatin (LIPITOR) 80 MG tablet, Take 1 tablet  by mouth Daily., Disp: 30 tablet, Rfl: 1    cloNIDine (CATAPRES) 0.1 MG tablet, Take 1 tablet by mouth 3 (Three) Times a Day As Needed for High Blood Pressure (SBP > 150)., Disp: 90 tablet, Rfl: 1    losartan (COZAAR) 50 MG tablet, Take 1 tablet by mouth Daily., Disp: 90 tablet, Rfl: 1    warfarin (COUMADIN) 2.5 MG tablet, Take one half tablet by mouth on Monday and Friday and one tablet by mouth all other days or as directed, Disp: 30 tablet, Rfl: 0    Allergies   Allergen Reactions    Ciprofloxacin Hives    Codeine GI Intolerance       Family History   Problem Relation Age of Onset    Heart disease Mother     Heart attack Mother     Hypertension Mother        Past Surgical History:   Procedure Laterality Date    APPENDECTOMY      ATRIAL APPENDAGE EXCLUSION LEFT WITH TRANSESOPHAGEAL ECHOCARDIOGRAM N/A 11/18/2022    Procedure: Atrial Appendage Occlusion Watchman Group aware;  Surgeon: Demian Damon MD;  Location: [facility] INVASIVE LOCATION;  Service: Cardiovascular;  Laterality: N/A;    CARDIOVERSION      CHOLECYSTECTOMY      MITRAL VALVE REPLACEMENT  05/20/2010    Mechanical     TONSILLECTOMY         Past Medical History:   Diagnosis Date    Atrial fibrillation     Hyperlipidemia     Hypertension     Mitral valve prolapse     Stroke     TIA (transient ischemic attack)        Family History   Problem Relation Age of Onset    Heart disease Mother     Heart attack Mother     Hypertension Mother        Social History     Socioeconomic History    Marital status:    Tobacco Use    Smoking status: Former     Types: Cigarettes     Quit date: 2009     Years since quitting: 15.0     Passive exposure: Past    Smokeless tobacco: Never   Vaping Use    Vaping Use: Never used   Substance and Sexual Activity    Alcohol use: Yes     Comment: social    Drug use: Never    Sexual activity: Defer           ECG 12 Lead    Date/Time: 1/18/2024 11:45 AM  Performed by: Ahmet Lynn MD    Authorized by: Ahmet Lynn MD   "Comparison: compared with previous ECG   Similar to previous ECG  Comparison to previous ECG: Atrial fibrillation with controlled ventricular response left ventricle hypertrophy with repolarization abnormality 64/min nonspecific ST-T wave changes no ectopy no significant change from previous EKG.        Objective:       Physical Exam    /85 (BP Location: Right arm, Patient Position: Sitting, Cuff Size: Adult) Comment: pt states whitecoat htn  Pulse 64   Ht 160 cm (63\")   Wt 59 kg (130 lb)   SpO2 98% Comment: ra  BMI 23.03 kg/m²   The patient is alert, oriented and in no distress.    Vital signs as noted above.    Head and neck revealed no carotid bruits or jugular venous distension.  No thyromegaly or lymphadenopathy is present.    Lungs clear.  No wheezing.  Breath sounds are normal bilaterally.    Heart normal and crisp prosthetic heart sounds.  No murmur..  No pericardial rub is present.  No gallop is present.    Abdomen soft and nontender.  No organomegaly is present.    Extremities revealed good peripheral pulses without any pedal edema.    Skin warm and dry.    Musculoskeletal system is grossly normal.    CNS grossly normal.    Reviewed and updated.        "

## 2024-01-08 ENCOUNTER — ANTICOAGULATION VISIT (OUTPATIENT)
Dept: CARDIOLOGY | Facility: CLINIC | Age: 79
End: 2024-01-08
Payer: MEDICARE

## 2024-01-08 DIAGNOSIS — Z79.01 LONG TERM (CURRENT) USE OF ANTICOAGULANTS: Primary | ICD-10-CM

## 2024-01-08 LAB — INR PPP: 3

## 2024-01-18 ENCOUNTER — OFFICE VISIT (OUTPATIENT)
Dept: CARDIOLOGY | Facility: CLINIC | Age: 79
End: 2024-01-18
Payer: MEDICARE

## 2024-01-18 VITALS
WEIGHT: 130 LBS | SYSTOLIC BLOOD PRESSURE: 162 MMHG | HEART RATE: 64 BPM | BODY MASS INDEX: 23.04 KG/M2 | OXYGEN SATURATION: 98 % | HEIGHT: 63 IN | DIASTOLIC BLOOD PRESSURE: 85 MMHG

## 2024-01-18 DIAGNOSIS — I48.20 ATRIAL FIBRILLATION, CHRONIC: ICD-10-CM

## 2024-01-18 DIAGNOSIS — E78.5 DYSLIPIDEMIA: ICD-10-CM

## 2024-01-18 DIAGNOSIS — I10 ESSENTIAL HYPERTENSION: ICD-10-CM

## 2024-01-18 DIAGNOSIS — Z95.2 HISTORY OF MITRAL VALVE REPLACEMENT: ICD-10-CM

## 2024-01-18 DIAGNOSIS — I63.40 CEREBROVASCULAR ACCIDENT (CVA) DUE TO EMBOLISM OF CEREBRAL ARTERY: ICD-10-CM

## 2024-01-18 DIAGNOSIS — Z79.01 LONG TERM (CURRENT) USE OF ANTICOAGULANTS: Primary | ICD-10-CM

## 2024-01-18 DIAGNOSIS — R47.01 MILD APHASIA: ICD-10-CM

## 2024-01-18 DIAGNOSIS — Z95.2 S/P MVR (MITRAL VALVE REPLACEMENT): ICD-10-CM

## 2024-01-18 DIAGNOSIS — I63.9 CEREBROVASCULAR ACCIDENT (CVA), UNSPECIFIED MECHANISM: ICD-10-CM

## 2024-01-18 DIAGNOSIS — Z95.2 H/O MITRAL VALVE REPLACEMENT WITH MECHANICAL VALVE: ICD-10-CM

## 2024-01-18 DIAGNOSIS — Z86.73 HISTORY OF CVA (CEREBROVASCULAR ACCIDENT): ICD-10-CM

## 2024-01-22 ENCOUNTER — ANTICOAGULATION VISIT (OUTPATIENT)
Dept: CARDIOLOGY | Facility: CLINIC | Age: 79
End: 2024-01-22
Payer: MEDICARE

## 2024-01-22 DIAGNOSIS — Z79.01 LONG TERM (CURRENT) USE OF ANTICOAGULANTS: Primary | ICD-10-CM

## 2024-01-22 LAB — INR PPP: 5

## 2024-01-31 ENCOUNTER — ANTICOAGULATION VISIT (OUTPATIENT)
Dept: CARDIOLOGY | Facility: CLINIC | Age: 79
End: 2024-01-31
Payer: MEDICARE

## 2024-01-31 DIAGNOSIS — Z79.01 LONG TERM (CURRENT) USE OF ANTICOAGULANTS: Primary | ICD-10-CM

## 2024-01-31 LAB — INR PPP: 1.9

## 2024-02-12 ENCOUNTER — ANTICOAGULATION VISIT (OUTPATIENT)
Dept: CARDIOLOGY | Facility: CLINIC | Age: 79
End: 2024-02-12
Payer: MEDICARE

## 2024-02-12 DIAGNOSIS — Z79.01 LONG TERM (CURRENT) USE OF ANTICOAGULANTS: Primary | ICD-10-CM

## 2024-02-12 LAB — INR PPP: 2.6

## 2024-02-26 ENCOUNTER — ANTICOAGULATION VISIT (OUTPATIENT)
Dept: CARDIOLOGY | Facility: CLINIC | Age: 79
End: 2024-02-26
Payer: MEDICARE

## 2024-02-26 DIAGNOSIS — Z79.01 LONG TERM (CURRENT) USE OF ANTICOAGULANTS: Primary | ICD-10-CM

## 2024-02-26 LAB — INR PPP: 1.9

## 2024-02-26 NOTE — PROGRESS NOTES
Spoke to pt, she states she has been eating more greens during lent. Will take 7.5 mgs today, then increase her dosage to 5 mgs on Monday and Friday with 2.5 mgs all other days. Will recheck as contracted. Opal

## 2024-03-11 ENCOUNTER — ANTICOAGULATION VISIT (OUTPATIENT)
Dept: CARDIOLOGY | Facility: CLINIC | Age: 79
End: 2024-03-11
Payer: MEDICARE

## 2024-03-11 DIAGNOSIS — Z79.01 LONG TERM (CURRENT) USE OF ANTICOAGULANTS: Primary | ICD-10-CM

## 2024-03-11 LAB — INR PPP: 4.8

## 2024-03-11 NOTE — PROGRESS NOTES
Pts INR is high at 4.8. Pt will hold Warfarin today, then continue current dosage and recheck in a week. jusRN

## 2024-03-18 ENCOUNTER — ANTICOAGULATION VISIT (OUTPATIENT)
Dept: CARDIOLOGY | Facility: CLINIC | Age: 79
End: 2024-03-18
Payer: MEDICARE

## 2024-03-18 DIAGNOSIS — Z79.01 LONG TERM (CURRENT) USE OF ANTICOAGULANTS: Primary | ICD-10-CM

## 2024-03-18 LAB — INR PPP: 4

## 2024-04-01 ENCOUNTER — ANTICOAGULATION VISIT (OUTPATIENT)
Dept: CARDIOLOGY | Facility: CLINIC | Age: 79
End: 2024-04-01
Payer: MEDICARE

## 2024-04-01 DIAGNOSIS — Z79.01 LONG TERM (CURRENT) USE OF ANTICOAGULANTS: Primary | ICD-10-CM

## 2024-04-01 LAB — INR PPP: 4

## 2024-04-15 ENCOUNTER — ANTICOAGULATION VISIT (OUTPATIENT)
Dept: CARDIOLOGY | Facility: CLINIC | Age: 79
End: 2024-04-15
Payer: MEDICARE

## 2024-04-15 DIAGNOSIS — Z79.01 LONG TERM (CURRENT) USE OF ANTICOAGULANTS: Primary | ICD-10-CM

## 2024-04-15 LAB — INR PPP: 3.5

## 2024-04-29 ENCOUNTER — ANTICOAGULATION VISIT (OUTPATIENT)
Dept: CARDIOLOGY | Facility: CLINIC | Age: 79
End: 2024-04-29
Payer: MEDICARE

## 2024-04-29 DIAGNOSIS — Z79.01 LONG TERM (CURRENT) USE OF ANTICOAGULANTS: Primary | ICD-10-CM

## 2024-04-29 LAB — INR PPP: 5.1

## 2024-04-29 NOTE — PROGRESS NOTES
Pts INR was elevated at 5.1. She had taken some OTC arthritis med last week. She will hold her 5 mg tab today, then resume her regular dosage. She will try to increase her green leafy vegs in diet also. Opal

## 2024-05-13 ENCOUNTER — ANTICOAGULATION VISIT (OUTPATIENT)
Dept: CARDIOLOGY | Facility: CLINIC | Age: 79
End: 2024-05-13
Payer: MEDICARE

## 2024-05-13 DIAGNOSIS — Z79.01 LONG TERM (CURRENT) USE OF ANTICOAGULANTS: Primary | ICD-10-CM

## 2024-05-13 LAB — INR PPP: 4.1

## 2024-05-13 NOTE — PROGRESS NOTES
Pts INR was still running slightly high at 4.1. She will hold Warfarin today and eat some salads this week. Recheck in 2 weeks, Spoke to pt. Opal

## 2024-05-28 ENCOUNTER — ANTICOAGULATION VISIT (OUTPATIENT)
Dept: CARDIOLOGY | Facility: CLINIC | Age: 79
End: 2024-05-28
Payer: MEDICARE

## 2024-05-28 DIAGNOSIS — Z79.01 LONG TERM (CURRENT) USE OF ANTICOAGULANTS: Primary | ICD-10-CM

## 2024-05-28 LAB — INR PPP: 3.8

## 2024-05-28 NOTE — PROGRESS NOTES
Pts INR is borderline high at 3.98, but will continue current dosage at this time. Recheck as contracted. jusRN

## 2024-06-10 ENCOUNTER — ANTICOAGULATION VISIT (OUTPATIENT)
Dept: CARDIOLOGY | Facility: CLINIC | Age: 79
End: 2024-06-10
Payer: MEDICARE

## 2024-06-10 DIAGNOSIS — Z79.01 LONG TERM (CURRENT) USE OF ANTICOAGULANTS: Primary | ICD-10-CM

## 2024-06-10 LAB — INR PPP: 3.9

## 2024-06-24 ENCOUNTER — ANTICOAGULATION VISIT (OUTPATIENT)
Dept: CARDIOLOGY | Facility: CLINIC | Age: 79
End: 2024-06-24
Payer: MEDICARE

## 2024-06-24 DIAGNOSIS — Z79.01 LONG TERM (CURRENT) USE OF ANTICOAGULANTS: Primary | ICD-10-CM

## 2024-06-24 LAB — INR PPP: 5.3

## 2024-06-24 RX ORDER — ATENOLOL 100 MG/1
50 TABLET ORAL EVERY 12 HOURS SCHEDULED
Qty: 90 TABLET | Refills: 2 | Status: SHIPPED | OUTPATIENT
Start: 2024-06-24

## 2024-06-24 NOTE — TELEPHONE ENCOUNTER
Rx Refill Note  Requested Prescriptions     Signed Prescriptions Disp Refills    atenolol (TENORMIN) 100 MG tablet 90 tablet 2     Sig: Take 1/2 (one-half) tablet by mouth twice daily     Authorizing Provider: CINDY LIZ     Ordering User: TRAN EPPS      Last office visit with prescribing clinician: 1/18/2024   Last telemedicine visit with prescribing clinician: Visit date not found   Next office visit with prescribing clinician: 7/25/2024                         Would you like a call back once the refill request has been completed: [] Yes [] No    If the office needs to give you a call back, can they leave a voicemail: [] Yes [] No    Tran Epps MA  06/24/24, 07:42 EDT

## 2024-07-08 ENCOUNTER — ANTICOAGULATION VISIT (OUTPATIENT)
Dept: CARDIOLOGY | Facility: CLINIC | Age: 79
End: 2024-07-08
Payer: MEDICARE

## 2024-07-08 DIAGNOSIS — Z79.01 LONG TERM (CURRENT) USE OF ANTICOAGULANTS: Primary | ICD-10-CM

## 2024-07-08 LAB — INR PPP: 2.7

## 2024-07-15 NOTE — PROGRESS NOTES
Encounter Date:07/25/2024    Last seen 1/18/2024      Patient ID: Melody Hernandez is a 78 y.o. female.    Chief Complaint:     Status post mitral valve replacement (mechanical  Anticoagulation management.  History of stroke  Hypertension        History of present illness  Since I have last seen, the patient has been without any chest discomfort ,shortness of breath, palpitations, dizziness or syncope.  Denies having any headache ,abdominal pain ,nausea, vomiting , diarrhea constipation, loss of weight or loss of appetite.  Denies having any excessive bruising ,hematuria or blood in the stool.    Review of all systems negative except as indicated.    Reviewed ROS.  Assessment and plan     ///////////////////  History  ==============  -History of recurrent TIA with expressive aphasia-improved.     -History of left arm tingling and numbness and difficulty grasping pressure improved.  Clinically patient has right higher parietal small stroke.  CT angiogram was negative.  MRI could not be performed due to mechanical mitral valve replacement      - Status post mitral valve replacement with [employer] mechanical heart valve and pulmonary vein isolation for atrial fibrillation May 2010 (patient had history of significant mitral valve prolapse and mitral regurgitation).     -   Persistent and chronic atrial fibrillation . Status post electrical cardioversion 05/24/2013 and 05/13/2016.   Patient is in atrial fibrillation today.     -moderate tricuspid regurgitation     - left bundle-branch block     Patient recently was admitted with recurrent TIA and expressive aphasia that has improved.  Patient is being evaluated and scheduled for watchman procedure 12/29/2022  Patient had pre watchman DRAKE that revealed significantly enlarged left atrial appendage and watchman procedure was not performed.     Left ventricular systolic function is normal. Left ventricular ejection fraction appears to be 56 - 60%.  •  There is a mechanical  mitral valve prosthesis present.  •  Watchman device patient procedure was aborted because of very dilated left atrial appendage.     DRAKE Dr. Calloway- 11/26/2022 revealed  Left ventricular systolic function is normal. Left ventricular ejection fraction appears to be 56 - 60%.  •  There is a mechanical mitral valve prosthesis present.  •  Watchman device patient procedure was aborted because of very dilated left atrial appendage.        Transesophageal echocardiogram 10/24/2022  Mitral mechanical valve is structurally normal with mild mitral regurgitation.  Mild to moderate tricuspid regurgitation is present with calculated pulmonary artery pressure was 26 mmHg.  Severe left atrial enlargement as well as left atrial appendage enlargement without definite clot.  However, severe smoking effect is present in the left atrium and left atrial appendage.  Left ventricle is normal in size and contractility with ejection fraction of 60%.  Aortic intimal thickening is present without clot.     Echocardiogram-8/23/2022 revealed mild tricuspid regurgitation normally functioning mitral prosthetic valve and normal left ventricle function.  Left atrial enlargement.  Lexiscan test-normal- 8/23/2022     Lexiscan Cardiolite test-normal 6/25/2020  Echocardiogram 6/25/2020 revealed biatrial enlargement structurally and functionally normal mitral prosthetic valve moderate tricuspid regurgitation.  Left ventricle ejection fraction is 60%.-  Exertional shortness of breath and fatigue  -stable and improved     Lexiscan Cardiolite test is negative for myocardial ischemia 10/25/2016.  Echocardiogram 10/25/2016 revealed normally functioning prosthetic mitral valve biatrial enlargement moderate mitral regurgitation and thickened aortic valve.     - Dyslipidemia and hypertension      - Smoker      - Family history of coronary artery disease  .   - allergy to Cipro and codeine     -history of intolerance to  amiodarone.  ============  Plan  ================  History of recurrent TIA and expressive aphasia that has improved.  No further episodes.  Watchman procedure was considered.  Patient had pre watchman DRAKE that revealed significantly enlarged left atrial appendage and watchman procedure was not performed.-Dr. Calloway-11/22/2022     Left ventricular systolic function is normal. Left ventricular ejection fraction appears to be 56 - 60%.  •  There is a mechanical mitral valve prosthesis present.  •  Watchman device patient procedure was aborted because of very dilated left atrial appendage.        History of expressive aphasia.-Resolved.  Recurrent TIA.  With left arm tingling and numbness and difficulty grasping pressure improved.  Clinically patient has right higher parietal small stroke.  CT angiogram was negative.  MRI could not be performed due to mechanical mitral valve replacement      Status post mechanical mitral valve replacement  EKG showed atrial fibrillation with controlled ventricular response      Anticoagulation status reviewed.  INR 2.4- 1/2/2023 7/22/2024-4.2  Recent INR was running in therapeutic range for mechanical valve.     Renal dysfunction  BUN/creatinine-19/1.42     Dyslipidemia-continue atorvastatin     Hypertension-178/103  Patient has been having increased heart rate and shortness of breath with activity.  No chest pain.  Increase atenolol to 100 mg in the morning (100 mg tablet) and 50 mg in the evening.  However she has been having dizziness when she takes clonidine.  Patient is on losartan 50 mg a day.    Have discussed with Dr. Damon about advisability of larger Watchman device.  Apparently it is not approved yet.  Have discussed about this with patient.     Medications were reviewed and updated.     Follow-up in the office in 6 months.     Further plan will depend on patient's progress.     Reviewed and updated-7/25/2024.  [[[[[[[[[[[[[[[[[[[[[[[[[                Diagnosis Plan   1. Long  term (current) use of anticoagulants [Z79.01]        2. S/P MVR (mitral valve replacement)        3. History of mitral valve replacement        4. History of CVA (cerebrovascular accident)        5. Mild aphasia        6. H/O mitral valve replacement with mechanical valve        7. Essential hypertension        8. Atrial fibrillation, chronic        9. Dyslipidemia        10. Cerebrovascular accident (CVA) due to embolism of cerebral artery        11. Cerebrovascular accident (CVA), unspecified mechanism        LAB RESULTS (LAST 7 DAYS)    CBC        BMP        CMP         BNP        TROPONIN        CoAg        Creatinine Clearance  CrCl cannot be calculated (Patient's most recent lab result is older than the maximum 30 days allowed.).    ABG        Radiology  No radiology results for the last day                The following portions of the patient's history were reviewed and updated as appropriate: allergies, current medications, past family history, past medical history, past social history, past surgical history, and problem list.    Review of Systems   Constitutional: Positive for malaise/fatigue.   Cardiovascular:  Negative for chest pain, dyspnea on exertion, leg swelling and palpitations.   Respiratory:  Positive for shortness of breath. Negative for cough.    Gastrointestinal:  Negative for abdominal pain, nausea and vomiting.   Neurological:  Negative for dizziness, focal weakness, headaches, light-headedness and numbness.   All other systems reviewed and are negative.      Current Outpatient Medications:   •  acetaminophen (TYLENOL) 325 MG tablet, Take 2 tablets by mouth Every 4 (Four) Hours As Needed for Mild Pain., Disp: , Rfl:   •  aspirin (aspirin) 81 MG EC tablet, Take 1 tablet by mouth Daily., Disp: , Rfl:   •  atenolol (TENORMIN) 100 MG tablet, Take 1/2 (one-half) tablet by mouth twice daily, Disp: 90 tablet, Rfl: 2  •  atorvastatin (LIPITOR) 80 MG tablet, Take 1 tablet by mouth Daily., Disp: 30  tablet, Rfl: 1  •  cloNIDine (CATAPRES) 0.1 MG tablet, Take 1 tablet by mouth 3 (Three) Times a Day As Needed for High Blood Pressure (SBP > 150)., Disp: 90 tablet, Rfl: 1  •  losartan (COZAAR) 50 MG tablet, Take 1 tablet by mouth Daily., Disp: 90 tablet, Rfl: 1  •  warfarin (COUMADIN) 2.5 MG tablet, Take one half tablet by mouth on Monday and Friday and one tablet by mouth all other days or as directed, Disp: 30 tablet, Rfl: 0    Allergies   Allergen Reactions   • Ciprofloxacin Hives   • Codeine GI Intolerance       Family History   Problem Relation Age of Onset   • Heart disease Mother    • Heart attack Mother    • Hypertension Mother        Past Surgical History:   Procedure Laterality Date   • APPENDECTOMY     • ATRIAL APPENDAGE EXCLUSION LEFT WITH TRANSESOPHAGEAL ECHOCARDIOGRAM N/A 11/18/2022    Procedure: Atrial Appendage Occlusion Watchman Group aware;  Surgeon: Demian Damon MD;  Location: [facility] INVASIVE LOCATION;  Service: Cardiovascular;  Laterality: N/A;   • CARDIOVERSION     • CHOLECYSTECTOMY     • MITRAL VALVE REPLACEMENT  05/20/2010    Mechanical    • TONSILLECTOMY         Past Medical History:   Diagnosis Date   • Atrial fibrillation    • Hyperlipidemia    • Hypertension    • Mitral valve prolapse    • Stroke    • TIA (transient ischemic attack)        Family History   Problem Relation Age of Onset   • Heart disease Mother    • Heart attack Mother    • Hypertension Mother        Social History     Socioeconomic History   • Marital status:    Tobacco Use   • Smoking status: Former     Current packs/day: 0.00     Types: Cigarettes     Quit date: 2009     Years since quitting: 15.5     Passive exposure: Past   • Smokeless tobacco: Never   Vaping Use   • Vaping status: Never Used   Substance and Sexual Activity   • Alcohol use: Yes     Comment: social   • Drug use: Never   • Sexual activity: Defer           ECG 12 Lead    Date/Time: 7/25/2024 11:27 AM  Performed by: Ahmet Lynn,  MD    Authorized by: Ahmet Lynn MD  Comparison: compared with previous ECG   Comparison to previous ECG: Atrial fibrillation with controlled ventricular response left bundle branch block 65/min nonspecific ST-T wave changes left anterior fascicular block no significant change from previous EKG.        Objective:       Physical Exam    There were no vitals taken for this visit.  The patient is alert, oriented and in no distress.    Vital signs as noted above.    Head and neck revealed no carotid bruits or jugular venous distension.  No thyromegaly or lymphadenopathy is present.    Lungs clear.  No wheezing.  Breath sounds are normal bilaterally.    Heart normal and crisp prosthetic heart sounds.  No murmur..  No pericardial rub is present.  No gallop is present.    Abdomen soft and nontender.  No organomegaly is present.    Extremities revealed good peripheral pulses without any pedal edema.    Skin warm and dry.    Musculoskeletal system is grossly normal.    CNS grossly normal.    Reviewed and updated.

## 2024-07-22 ENCOUNTER — ANTICOAGULATION VISIT (OUTPATIENT)
Dept: CARDIOLOGY | Facility: CLINIC | Age: 79
End: 2024-07-22
Payer: MEDICARE

## 2024-07-22 ENCOUNTER — TELEPHONE (OUTPATIENT)
Dept: CARDIOLOGY | Facility: CLINIC | Age: 79
End: 2024-07-22
Payer: MEDICARE

## 2024-07-22 DIAGNOSIS — Z79.01 LONG TERM (CURRENT) USE OF ANTICOAGULANTS: Primary | ICD-10-CM

## 2024-07-22 LAB — INR PPP: 4.7

## 2024-07-22 NOTE — TELEPHONE ENCOUNTER
Pt had lab work on 7/18/24 at PCP office. She would like Dr Lynn to review lab results. She asked Dr Mancrea's office to fax results. Advised pt we have not received results yet.

## 2024-07-25 ENCOUNTER — OFFICE VISIT (OUTPATIENT)
Dept: CARDIOLOGY | Facility: CLINIC | Age: 79
End: 2024-07-25
Payer: MEDICARE

## 2024-07-25 VITALS
DIASTOLIC BLOOD PRESSURE: 103 MMHG | SYSTOLIC BLOOD PRESSURE: 178 MMHG | HEIGHT: 63 IN | WEIGHT: 127 LBS | BODY MASS INDEX: 22.5 KG/M2 | HEART RATE: 69 BPM | OXYGEN SATURATION: 99 %

## 2024-07-25 DIAGNOSIS — R47.01 MILD APHASIA: ICD-10-CM

## 2024-07-25 DIAGNOSIS — Z95.2 S/P MVR (MITRAL VALVE REPLACEMENT): ICD-10-CM

## 2024-07-25 DIAGNOSIS — I48.20 ATRIAL FIBRILLATION, CHRONIC: ICD-10-CM

## 2024-07-25 DIAGNOSIS — Z79.01 LONG TERM (CURRENT) USE OF ANTICOAGULANTS: Primary | ICD-10-CM

## 2024-07-25 DIAGNOSIS — Z95.2 H/O MITRAL VALVE REPLACEMENT WITH MECHANICAL VALVE: ICD-10-CM

## 2024-07-25 DIAGNOSIS — I63.40 CEREBROVASCULAR ACCIDENT (CVA) DUE TO EMBOLISM OF CEREBRAL ARTERY: ICD-10-CM

## 2024-07-25 DIAGNOSIS — E78.5 DYSLIPIDEMIA: ICD-10-CM

## 2024-07-25 DIAGNOSIS — Z86.73 HISTORY OF CVA (CEREBROVASCULAR ACCIDENT): ICD-10-CM

## 2024-07-25 DIAGNOSIS — I63.9 CEREBROVASCULAR ACCIDENT (CVA), UNSPECIFIED MECHANISM: ICD-10-CM

## 2024-07-25 DIAGNOSIS — Z95.2 HISTORY OF MITRAL VALVE REPLACEMENT: ICD-10-CM

## 2024-07-25 DIAGNOSIS — Z79.01 LONG TERM (CURRENT) USE OF ANTICOAGULANTS: ICD-10-CM

## 2024-07-25 DIAGNOSIS — I10 ESSENTIAL HYPERTENSION: ICD-10-CM

## 2024-07-25 RX ORDER — WARFARIN SODIUM 2.5 MG/1
TABLET ORAL
Qty: 90 TABLET | Refills: 0 | Status: SHIPPED | OUTPATIENT
Start: 2024-07-25

## 2024-07-25 NOTE — TELEPHONE ENCOUNTER
Rx Refill Note  Requested Prescriptions     Pending Prescriptions Disp Refills    warfarin (COUMADIN) 2.5 MG tablet 90 tablet 0     Sig: Take one half tablet by mouth on Friday's and one tablet by mouth all other days or as directed      Last office visit with prescribing clinician: 7/25/2024   Last telemedicine visit with prescribing clinician: Visit date not found   Next office visit with prescribing clinician: 1/30/2025       Anticoagulation Visit with Ahmet Lynn MD (07/22/2024)       Meredith Wang LPN  07/25/24, 16:46 EDT

## 2024-07-30 ENCOUNTER — TELEPHONE (OUTPATIENT)
Dept: CARDIOLOGY | Facility: CLINIC | Age: 79
End: 2024-07-30
Payer: MEDICARE

## 2024-07-30 NOTE — TELEPHONE ENCOUNTER
[facility] received a fax that requires your attention. The document has been indexed to the patient’s chart for your review.      Reason for sending: RECEIVED FAX THROUGH [employer]    Documents Description: DIANE HERMAN 9/22/23    Name of Sender: [facility]    Date Indexed: 07/30/24    Notes (if needed): DOES NOT APPEAR TO BE A REF

## 2024-08-05 ENCOUNTER — ANTICOAGULATION VISIT (OUTPATIENT)
Dept: CARDIOLOGY | Facility: CLINIC | Age: 79
End: 2024-08-05
Payer: MEDICARE

## 2024-08-05 DIAGNOSIS — Z79.01 LONG TERM (CURRENT) USE OF ANTICOAGULANTS: Primary | ICD-10-CM

## 2024-08-05 LAB — INR PPP: 3.2

## 2024-08-19 ENCOUNTER — ANTICOAGULATION VISIT (OUTPATIENT)
Dept: CARDIOLOGY | Facility: CLINIC | Age: 79
End: 2024-08-19
Payer: MEDICARE

## 2024-08-19 DIAGNOSIS — Z79.01 LONG TERM (CURRENT) USE OF ANTICOAGULANTS: Primary | ICD-10-CM

## 2024-08-19 LAB — INR PPP: 4.1

## 2024-09-03 ENCOUNTER — ANTICOAGULATION VISIT (OUTPATIENT)
Dept: CARDIOLOGY | Facility: CLINIC | Age: 79
End: 2024-09-03
Payer: MEDICARE

## 2024-09-03 DIAGNOSIS — Z79.01 LONG TERM (CURRENT) USE OF ANTICOAGULANTS: Primary | ICD-10-CM

## 2024-09-03 LAB — INR PPP: 2.6

## 2024-09-03 NOTE — PROGRESS NOTES
Pts INR is WNL at 2.6. Spoke to pt and she will increase dosage to 2.5 mgs daily and recheck in a week. Opal

## 2024-09-16 ENCOUNTER — ANTICOAGULATION VISIT (OUTPATIENT)
Dept: CARDIOLOGY | Facility: CLINIC | Age: 79
End: 2024-09-16
Payer: MEDICARE

## 2024-09-16 DIAGNOSIS — Z79.01 LONG TERM (CURRENT) USE OF ANTICOAGULANTS: Primary | ICD-10-CM

## 2024-09-16 LAB — INR PPP: 2.8

## 2024-09-30 ENCOUNTER — ANTICOAGULATION VISIT (OUTPATIENT)
Dept: CARDIOLOGY | Facility: CLINIC | Age: 79
End: 2024-09-30
Payer: MEDICARE

## 2024-09-30 DIAGNOSIS — Z79.01 LONG TERM (CURRENT) USE OF ANTICOAGULANTS: Primary | ICD-10-CM

## 2024-09-30 LAB — INR PPP: 4

## 2024-09-30 NOTE — PROGRESS NOTES
Spoke to patient, she had to premedicate for dental work, advised to hold warfarin today only and resume 2.5 mg. She will need to premedicate again on Friday. Recheck 2 weeks.

## 2024-10-14 ENCOUNTER — ANTICOAGULATION VISIT (OUTPATIENT)
Dept: CARDIOLOGY | Facility: CLINIC | Age: 79
End: 2024-10-14
Payer: MEDICARE

## 2024-10-14 DIAGNOSIS — Z79.01 LONG TERM (CURRENT) USE OF ANTICOAGULANTS: Primary | ICD-10-CM

## 2024-10-14 LAB — INR PPP: 3.2

## 2024-10-29 DIAGNOSIS — Z95.2 H/O MITRAL VALVE REPLACEMENT WITH MECHANICAL VALVE: ICD-10-CM

## 2024-10-29 DIAGNOSIS — Z79.01 LONG TERM (CURRENT) USE OF ANTICOAGULANTS: ICD-10-CM

## 2024-10-29 RX ORDER — WARFARIN SODIUM 2.5 MG/1
TABLET ORAL
Qty: 90 TABLET | Refills: 0 | Status: SHIPPED | OUTPATIENT
Start: 2024-10-29

## 2024-11-11 ENCOUNTER — ANTICOAGULATION VISIT (OUTPATIENT)
Dept: CARDIOLOGY | Facility: CLINIC | Age: 79
End: 2024-11-11
Payer: MEDICARE

## 2024-11-11 DIAGNOSIS — Z79.01 LONG TERM (CURRENT) USE OF ANTICOAGULANTS: Primary | ICD-10-CM

## 2024-11-11 LAB — INR PPP: 3.1

## 2024-11-25 ENCOUNTER — ANTICOAGULATION VISIT (OUTPATIENT)
Dept: CARDIOLOGY | Facility: CLINIC | Age: 79
End: 2024-11-25
Payer: MEDICARE

## 2024-11-25 DIAGNOSIS — Z79.01 LONG TERM (CURRENT) USE OF ANTICOAGULANTS: Primary | ICD-10-CM

## 2024-11-25 LAB — INR PPP: 2.8

## 2024-12-09 ENCOUNTER — ANTICOAGULATION VISIT (OUTPATIENT)
Dept: CARDIOLOGY | Facility: CLINIC | Age: 79
End: 2024-12-09
Payer: MEDICARE

## 2024-12-09 DIAGNOSIS — Z79.01 LONG TERM (CURRENT) USE OF ANTICOAGULANTS: Primary | ICD-10-CM

## 2024-12-09 LAB — INR PPP: 3.1

## 2024-12-23 ENCOUNTER — ANTICOAGULATION VISIT (OUTPATIENT)
Dept: CARDIOLOGY | Facility: CLINIC | Age: 79
End: 2024-12-23
Payer: MEDICARE

## 2024-12-23 DIAGNOSIS — Z79.01 LONG TERM (CURRENT) USE OF ANTICOAGULANTS: Primary | ICD-10-CM

## 2024-12-23 LAB — INR PPP: 4.7

## 2024-12-30 ENCOUNTER — ANTICOAGULATION VISIT (OUTPATIENT)
Dept: CARDIOLOGY | Facility: CLINIC | Age: 79
End: 2024-12-30
Payer: MEDICARE

## 2024-12-30 DIAGNOSIS — Z79.01 LONG TERM (CURRENT) USE OF ANTICOAGULANTS: Primary | ICD-10-CM

## 2024-12-30 LAB — INR PPP: 3.1

## 2025-01-13 ENCOUNTER — ANTICOAGULATION VISIT (OUTPATIENT)
Dept: CARDIOLOGY | Facility: CLINIC | Age: 80
End: 2025-01-13
Payer: MEDICARE

## 2025-01-13 DIAGNOSIS — Z79.01 LONG TERM (CURRENT) USE OF ANTICOAGULANTS: Primary | ICD-10-CM

## 2025-01-13 LAB — INR PPP: 2.9

## 2025-01-19 NOTE — PROGRESS NOTES
Encounter Date:01/30/2025    Last seen-7/25/2024      Patient ID: Melody Hernandez is a 79 y.o. female.      Chief Complaint:  Status post mechanical mitral valve replacement  Anticoagulation management  Hypertension  History of stroke       History of present illness  Since I have last seen, the patient has been without any chest discomfort ,shortness of breath, palpitations, dizziness or syncope.  Denies having any headache ,abdominal pain ,nausea, vomiting , diarrhea constipation, loss of weight or loss of appetite.  Denies having any excessive bruising ,hematuria or blood in the stool.    Review of all systems negative except as indicated.    Reviewed ROS.  Assessment and plan     ///////////////////  History  ==============  -History of recurrent TIA with expressive aphasia-improved.     -History of left arm tingling and numbness and difficulty grasping pressure improved.  Clinically patient has right higher parietal small stroke.  CT angiogram was negative.  MRI could not be performed due to mechanical mitral valve replacement      - Status post mitral valve replacement with [place] mechanical heart valve and pulmonary vein isolation for atrial fibrillation May 2010 (patient had history of significant mitral valve prolapse and mitral regurgitation).     -   Persistent and chronic atrial fibrillation . Status post electrical cardioversion 05/24/2013 and 05/13/2016.   Patient is in atrial fibrillation today.     -moderate tricuspid regurgitation     - left bundle-branch block     Patient recently was admitted with recurrent TIA and expressive aphasia that has improved.  Patient is being evaluated and scheduled for watchman procedure 12/29/2022  Patient had pre watchman DRAKE that revealed significantly enlarged left atrial appendage and watchman procedure was not performed.     Left ventricular systolic function is normal. Left ventricular ejection fraction appears to be 56 - 60%.    There is a mechanical mitral  valve prosthesis present.    Watchman device patient procedure was aborted because of very dilated left atrial appendage.     DRAKE Dr. Calloway- 11/26/2022 revealed  Left ventricular systolic function is normal. Left ventricular ejection fraction appears to be 56 - 60%.    There is a mechanical mitral valve prosthesis present.    Watchman device patient procedure was aborted because of very dilated left atrial appendage.        Transesophageal echocardiogram 10/24/2022  Mitral mechanical valve is structurally normal with mild mitral regurgitation.  Mild to moderate tricuspid regurgitation is present with calculated pulmonary artery pressure was 26 mmHg.  Severe left atrial enlargement as well as left atrial appendage enlargement without definite clot.  However, severe smoking effect is present in the left atrium and left atrial appendage.  Left ventricle is normal in size and contractility with ejection fraction of 60%.  Aortic intimal thickening is present without clot.     Echocardiogram-8/23/2022 revealed mild tricuspid regurgitation normally functioning mitral prosthetic valve and normal left ventricle function.  Left atrial enlargement.  Lexiscan test-normal- 8/23/2022     Lexiscan Cardiolite test-normal 6/25/2020  Echocardiogram 6/25/2020 revealed biatrial enlargement structurally and functionally normal mitral prosthetic valve moderate tricuspid regurgitation.  Left ventricle ejection fraction is 60%.-  Exertional shortness of breath and fatigue  -stable and improved     Lexiscan Cardiolite test is negative for myocardial ischemia 10/25/2016.  Echocardiogram 10/25/2016 revealed normally functioning prosthetic mitral valve biatrial enlargement moderate mitral regurgitation and thickened aortic valve.     - Dyslipidemia and hypertension      - Smoker      - Family history of coronary artery disease  .   - allergy to Cipro and codeine     -history of intolerance to  amiodarone.  ============  Plan  ================  Status post mechanical mitral valve replacement.  Patient is not having any angina pectoris or congestive heart failure.    Chronic atrial fibrillation-rate is well-controlled.  EKG 1/30/2025-atrial fibrillation    Anticoagulation status reviewed.  Patient is on Coumadin.  Observe for toxic effects.  PT/INR on a monthly basis.    History of TIA/expressive aphasia-improved.  No further episodes.  CT angiogram was negative.  MRI could not be performed due to mechanical mitral valve.    Watchman procedure was considered.  Patient had pre watchman DRAKE that revealed significantly enlarged left atrial appendage and watchman procedure was not performed.-Dr. Calloway-11/22/2022     Left ventricular systolic function is normal. Left ventricular ejection fraction appears to be 56 - 60%.    There is a mechanical mitral valve prosthesis present.    Watchman device patient procedure was aborted because of very dilated left atrial appendage.        Renal dysfunction-stable  BUN/creatinine-19/1.42     Dyslipidemia-continue atorvastatin.    Hypertension-180/90.  Blood pressure is running normal at home.  Maintain blood pressure log.  Continue atenolol 100 mg in the morning (100 mg tablet) and 50 mg in the evening.  However she has been having dizziness when she takes clonidine.  Patient is on losartan 50 mg a day.     Have discussed with Dr. Damon about advisability of larger Watchman device.  Apparently it is not approved yet.  Have discussed about this with patient.  With respect to whether she has Watchman device monitor she needs to be on anticoagulation due to mechanical mitral valve anyway.     Medications were reviewed and updated.  Follow-up in the office in 6 months.     Further plan will depend on patient's progress.     Reviewed and updated 1/30/2025.  [[[[[[[[[[[[[[[[[[[[[[[[[                Diagnosis Plan   1. Long term (current) use of anticoagulants [Z79.01]        2.  History of mitral valve replacement        3. Essential hypertension        4. H/O mitral valve replacement with mechanical valve        5. History of CVA (cerebrovascular accident)        6. S/P MVR (mitral valve replacement)        7. Mild aphasia        8. Dyslipidemia        9. Atrial fibrillation, chronic        LAB RESULTS (LAST 7 DAYS)    CBC        BMP        CMP         BNP        TROPONIN        CoAg  Results from last 7 days   Lab Units 01/13/25  0000   INR  2.90       Creatinine Clearance  CrCl cannot be calculated (Patient's most recent lab result is older than the maximum 30 days allowed.).    ABG        Radiology  No radiology results for the last day                The following portions of the patient's history were reviewed and updated as appropriate: allergies, current medications, past family history, past medical history, past social history, past surgical history, and problem list.    Review of Systems   Constitutional: Negative for malaise/fatigue.   Cardiovascular:  Negative for chest pain, dyspnea on exertion, leg swelling and palpitations.   Respiratory:  Negative for cough and shortness of breath.    Gastrointestinal:  Negative for abdominal pain, nausea and vomiting.   Neurological:  Negative for dizziness, focal weakness, headaches, light-headedness and numbness.   All other systems reviewed and are negative.      Current Outpatient Medications:     acetaminophen (TYLENOL) 325 MG tablet, Take 2 tablets by mouth Every 4 (Four) Hours As Needed for Mild Pain., Disp: , Rfl:     aspirin (aspirin) 81 MG EC tablet, Take 1 tablet by mouth Daily., Disp: , Rfl:     atenolol (TENORMIN) 100 MG tablet, Take 1/2 (one-half) tablet by mouth twice daily, Disp: 90 tablet, Rfl: 2    atorvastatin (LIPITOR) 80 MG tablet, Take 1 tablet by mouth Daily., Disp: 30 tablet, Rfl: 1    cloNIDine (CATAPRES) 0.1 MG tablet, Take 1 tablet by mouth 3 (Three) Times a Day As Needed for High Blood Pressure (SBP > 150).,  Disp: 90 tablet, Rfl: 1    losartan (COZAAR) 50 MG tablet, Take 1 tablet by mouth Daily., Disp: 90 tablet, Rfl: 1    warfarin (COUMADIN) 2.5 MG tablet, Take 1 tab, by mouth, daily or as directed., Disp: 90 tablet, Rfl: 0    Allergies   Allergen Reactions    Ciprofloxacin Hives    Codeine GI Intolerance       Family History   Problem Relation Age of Onset    Heart disease Mother     Heart attack Mother     Hypertension Mother        Past Surgical History:   Procedure Laterality Date    APPENDECTOMY      ATRIAL APPENDAGE EXCLUSION LEFT WITH TRANSESOPHAGEAL ECHOCARDIOGRAM N/A 2022    Procedure: Atrial Appendage Occlusion Watchman Group aware;  Surgeon: Demian Damon MD;  Location: [facility] INVASIVE LOC[facility];  Service: Cardiovascular;  Laterality: N/A;    CARDIOVERSION      CHOLECYSTECTOMY      MITRAL VALVE REPLACEMENT  2010    Mechanical     TONSILLECTOMY         Past Medical History:   Diagnosis Date    Atrial fibrillation     Hyperlipidemia     Hypertension     Mitral valve prolapse     Stroke     TIA (transient ischemic attack)        Family History   Problem Relation Age of Onset    Heart disease Mother     Heart attack Mother     Hypertension Mother        Social History     Socioeconomic History    Marital status:    Tobacco Use    Smoking status: Former     Current packs/day: 0.00     Types: Cigarettes     Quit date:      Years since quittin.0     Passive exposure: Past    Smokeless tobacco: Never   Vaping Use    Vaping status: Never Used   Substance and Sexual Activity    Alcohol use: Yes     Comment: social    Drug use: Never    Sexual activity: Defer           ECG 12 Lead    Date/Time: 2025 9:45 AM  Performed by: Ahmet Lynn MD    Authorized by: Ahmet Lynn MD  Comparison: compared with previous ECG   Similar to previous ECG  Comparison to previous ECG: Atrial fibrillation with controlled ventricular response left ventricular hypertrophy nonspecific  intraventricular conduction delay 67/min no ectopy no significant change from previous EKG.        Objective:       Physical Exam    There were no vitals taken for this visit.  The patient is alert, oriented and in no distress.    Vital signs as noted above.    Head and neck revealed no carotid bruits or jugular venous distension.  No thyromegaly or lymphadenopathy is present.    Lungs clear.  No wheezing.  Breath sounds are normal bilaterally.    Heart normal and crisp prosthetic heart sounds.  No murmur..  No pericardial rub is present.  No gallop is present.    Abdomen soft and nontender.  No organomegaly is present.    Extremities revealed good peripheral pulses without any pedal edema.    Skin warm and dry.    Musculoskeletal system is grossly normal.    CNS grossly normal.    Reviewed and updated.

## 2025-01-22 DIAGNOSIS — Z79.01 LONG TERM (CURRENT) USE OF ANTICOAGULANTS: ICD-10-CM

## 2025-01-22 DIAGNOSIS — Z95.2 H/O MITRAL VALVE REPLACEMENT WITH MECHANICAL VALVE: ICD-10-CM

## 2025-01-22 RX ORDER — WARFARIN SODIUM 2.5 MG/1
TABLET ORAL
Qty: 90 TABLET | Refills: 0 | Status: SHIPPED | OUTPATIENT
Start: 2025-01-22

## 2025-01-22 NOTE — TELEPHONE ENCOUNTER
Rx Refill Note  Requested Prescriptions     Pending Prescriptions Disp Refills    warfarin (COUMADIN) 2.5 MG tablet [Pharmacy Med Name: Warfarin Sodium 2.5 MG Oral Tablet] 90 tablet 0     Sig: TAKE 1 TABLET BY MOUTH ONCE DAILY OR  AS DIRECTED   Last INR 1/13/25   Last office visit with prescribing clinician: 7/25/2024   Last telemedicine visit with prescribing clinician: Visit date not found   Next office visit with prescribing clinician: 1/30/2025                         Would you like a call back once the refill request has been completed: [] Yes [] No    If the office needs to give you a call back, can they leave a voicemail: [] Yes [] No    Lissy Mcguire RN  01/22/25, 14:24 EST

## 2025-01-27 ENCOUNTER — ANTICOAGULATION VISIT (OUTPATIENT)
Dept: CARDIOLOGY | Facility: CLINIC | Age: 80
End: 2025-01-27
Payer: MEDICARE

## 2025-01-27 DIAGNOSIS — Z79.01 LONG TERM (CURRENT) USE OF ANTICOAGULANTS: Primary | ICD-10-CM

## 2025-01-27 LAB — INR PPP: 3.4

## 2025-01-30 ENCOUNTER — OFFICE VISIT (OUTPATIENT)
Dept: CARDIOLOGY | Facility: CLINIC | Age: 80
End: 2025-01-30
Payer: MEDICARE

## 2025-01-30 VITALS
SYSTOLIC BLOOD PRESSURE: 182 MMHG | WEIGHT: 132 LBS | BODY MASS INDEX: 23.39 KG/M2 | DIASTOLIC BLOOD PRESSURE: 91 MMHG | OXYGEN SATURATION: 99 % | HEIGHT: 63 IN | HEART RATE: 67 BPM

## 2025-01-30 DIAGNOSIS — I48.20 ATRIAL FIBRILLATION, CHRONIC: ICD-10-CM

## 2025-01-30 DIAGNOSIS — Z95.2 S/P MVR (MITRAL VALVE REPLACEMENT): ICD-10-CM

## 2025-01-30 DIAGNOSIS — Z95.2 HISTORY OF MITRAL VALVE REPLACEMENT: ICD-10-CM

## 2025-01-30 DIAGNOSIS — E78.5 DYSLIPIDEMIA: ICD-10-CM

## 2025-01-30 DIAGNOSIS — I10 ESSENTIAL HYPERTENSION: ICD-10-CM

## 2025-01-30 DIAGNOSIS — Z95.2 H/O MITRAL VALVE REPLACEMENT WITH MECHANICAL VALVE: ICD-10-CM

## 2025-01-30 DIAGNOSIS — Z86.73 HISTORY OF CVA (CEREBROVASCULAR ACCIDENT): ICD-10-CM

## 2025-01-30 DIAGNOSIS — Z79.01 LONG TERM (CURRENT) USE OF ANTICOAGULANTS: Primary | ICD-10-CM

## 2025-01-30 DIAGNOSIS — R47.01 MILD APHASIA: ICD-10-CM

## 2025-02-10 ENCOUNTER — ANTICOAGULATION VISIT (OUTPATIENT)
Dept: CARDIOLOGY | Facility: CLINIC | Age: 80
End: 2025-02-10
Payer: MEDICARE

## 2025-02-10 DIAGNOSIS — Z79.01 LONG TERM (CURRENT) USE OF ANTICOAGULANTS: Primary | ICD-10-CM

## 2025-02-10 LAB — INR PPP: 3.2

## 2025-02-24 ENCOUNTER — ANTICOAGULATION VISIT (OUTPATIENT)
Dept: CARDIOLOGY | Facility: CLINIC | Age: 80
End: 2025-02-24
Payer: MEDICARE

## 2025-02-24 DIAGNOSIS — Z79.01 LONG TERM (CURRENT) USE OF ANTICOAGULANTS: Primary | ICD-10-CM

## 2025-02-24 LAB — INR PPP: 3.1

## 2025-03-03 ENCOUNTER — TELEPHONE (OUTPATIENT)
Dept: CARDIOLOGY | Facility: CLINIC | Age: 80
End: 2025-03-03
Payer: MEDICARE

## 2025-03-03 NOTE — TELEPHONE ENCOUNTER
Caller: Melody Hernandez    Relationship to patient: Self    Best call back number: [phone]     Patient is needing: PT IS FOLLOWING UP ON CLEARANCE REQUEST, SAW ERIC'S MESSAGE REGARDING IT BEING ON DR LIZ'S DESK FOR REVIEW. PLS CALL AND ADVISE WITH ANY UPDATES REGARDING THIS- PT IS SCHEDULED FOR TOOTH EXTRACTION ON 3.6.25.

## 2025-03-03 NOTE — TELEPHONE ENCOUNTER
Per Dr. Lnyn patient can hold warfarin for 2 days. Patient understood to restart medication same day.  Understood.

## 2025-03-10 ENCOUNTER — ANTICOAGULATION VISIT (OUTPATIENT)
Dept: CARDIOLOGY | Facility: CLINIC | Age: 80
End: 2025-03-10
Payer: MEDICARE

## 2025-03-10 DIAGNOSIS — Z79.01 LONG TERM (CURRENT) USE OF ANTICOAGULANTS: Primary | ICD-10-CM

## 2025-03-10 LAB — INR PPP: 2.2

## 2025-03-10 RX ORDER — ATENOLOL 100 MG/1
50 TABLET ORAL EVERY 12 HOURS SCHEDULED
Qty: 90 TABLET | Refills: 3 | Status: SHIPPED | OUTPATIENT
Start: 2025-03-10

## 2025-03-10 NOTE — PROGRESS NOTES
Spoke to patient, had tooth pulled last week, held warfarin x 2 days. Advised to take 5 mg today and resume 2.5 mg daily. Check INR 2 weeks.

## 2025-03-10 NOTE — TELEPHONE ENCOUNTER
Rx Refill Note  Requested Prescriptions     Pending Prescriptions Disp Refills    atenolol (TENORMIN) 100 MG tablet [Pharmacy Med Name: Atenolol 100 MG Oral Tablet] 90 tablet 3     Sig: Take 1/2 (one-half) tablet by mouth twice daily      Last office visit with prescribing clinician: 1/30/2025   Last telemedicine visit with prescribing clinician: Visit date not found   Next office visit with prescribing clinician: 8/5/2025                         Would you like a call back once the refill request has been completed: [] Yes [] No    If the office needs to give you a call back, can they leave a voicemail: [] Yes [] No    Jimena Florence MA  03/10/25, 07:32 EDT

## 2025-03-24 ENCOUNTER — ANTICOAGULATION VISIT (OUTPATIENT)
Dept: CARDIOLOGY | Facility: CLINIC | Age: 80
End: 2025-03-24
Payer: MEDICARE

## 2025-03-24 DIAGNOSIS — Z79.01 LONG TERM (CURRENT) USE OF ANTICOAGULANTS: Primary | ICD-10-CM

## 2025-03-24 LAB — INR PPP: 2.2 (ref 0.9–1.1)

## 2025-03-24 PROCEDURE — 36416 COLLJ CAPILLARY BLOOD SPEC: CPT | Performed by: INTERNAL MEDICINE

## 2025-03-24 PROCEDURE — 85610 PROTHROMBIN TIME: CPT | Performed by: INTERNAL MEDICINE

## 2025-03-24 NOTE — PROGRESS NOTES
Patient's INR- 2.2, within therapeutic range. Continue current dose and recheck as contracted. dvLPN

## 2025-04-07 LAB
INR PPP: 3.2
INR PPP: 3.2

## 2025-04-08 ENCOUNTER — ANTICOAGULATION VISIT (OUTPATIENT)
Dept: CARDIOLOGY | Facility: CLINIC | Age: 80
End: 2025-04-08
Payer: MEDICARE

## 2025-04-08 DIAGNOSIS — Z79.01 LONG TERM (CURRENT) USE OF ANTICOAGULANTS: Primary | ICD-10-CM

## 2025-04-08 NOTE — PROGRESS NOTES
Patient's INR- 3.2, within therapeutic range. Continue current dose and recheck as contracted. dvLPN

## 2025-04-17 DIAGNOSIS — Z79.01 LONG TERM (CURRENT) USE OF ANTICOAGULANTS: ICD-10-CM

## 2025-04-17 DIAGNOSIS — Z95.2 H/O MITRAL VALVE REPLACEMENT WITH MECHANICAL VALVE: ICD-10-CM

## 2025-04-17 RX ORDER — WARFARIN SODIUM 2.5 MG/1
TABLET ORAL
Qty: 90 TABLET | Refills: 0 | Status: SHIPPED | OUTPATIENT
Start: 2025-04-17

## 2025-04-17 NOTE — TELEPHONE ENCOUNTER
Warfarin 2.5mg, TAKE 1 TABLET BY MOUTH ONCE DAILY OR  AS DIRECTED. Script sent to [facility] for refill. dvLPN

## 2025-04-21 ENCOUNTER — ANTICOAGULATION VISIT (OUTPATIENT)
Dept: CARDIOLOGY | Facility: CLINIC | Age: 80
End: 2025-04-21
Payer: MEDICARE

## 2025-04-21 DIAGNOSIS — Z79.01 LONG TERM (CURRENT) USE OF ANTICOAGULANTS: Primary | ICD-10-CM

## 2025-04-21 LAB — INR PPP: 4.9

## 2025-04-21 NOTE — PROGRESS NOTES
Spoke to pt, INR was elevated at 4.9. Pt reports decreased dietary intake during Lent which may have effected her result. She will hold Warfarin for 2 days, then resume and recheck INR on Friday. Opal

## 2025-04-25 ENCOUNTER — ANTICOAGULATION VISIT (OUTPATIENT)
Dept: CARDIOLOGY | Facility: CLINIC | Age: 80
End: 2025-04-25
Payer: MEDICARE

## 2025-04-25 DIAGNOSIS — Z79.01 LONG TERM (CURRENT) USE OF ANTICOAGULANTS: Primary | ICD-10-CM

## 2025-04-25 LAB — INR PPP: 3

## 2025-04-25 NOTE — PROGRESS NOTES
Patient's INR- 3.0, within therapeutic range. Continue current dosage and recheck as contracted. ChristoN

## 2025-05-12 ENCOUNTER — ANTICOAGULATION VISIT (OUTPATIENT)
Dept: CARDIOLOGY | Facility: CLINIC | Age: 80
End: 2025-05-12
Payer: MEDICARE

## 2025-05-12 DIAGNOSIS — Z79.01 LONG TERM (CURRENT) USE OF ANTICOAGULANTS: Primary | ICD-10-CM

## 2025-05-12 LAB — INR PPP: 8

## 2025-05-12 NOTE — PROGRESS NOTES
Spoke to pt about high INR. She reports loss of appetite recently, has been taking Tylenol arthritis and had a few beers over the weekend. She will hold Warfarin until Thursday and recheck INR. Advised her to increase Vit K in her diet over that next few days. Opal

## 2025-05-15 ENCOUNTER — ANTICOAGULATION VISIT (OUTPATIENT)
Dept: CARDIOLOGY | Facility: CLINIC | Age: 80
End: 2025-05-15
Payer: MEDICARE

## 2025-05-15 DIAGNOSIS — Z79.01 LONG TERM (CURRENT) USE OF ANTICOAGULANTS: Primary | ICD-10-CM

## 2025-05-15 LAB — INR PPP: 3.3

## 2025-05-19 ENCOUNTER — ANTICOAGULATION VISIT (OUTPATIENT)
Dept: CARDIOLOGY | Facility: CLINIC | Age: 80
End: 2025-05-19
Payer: MEDICARE

## 2025-05-19 DIAGNOSIS — Z79.01 LONG TERM (CURRENT) USE OF ANTICOAGULANTS: Primary | ICD-10-CM

## 2025-05-19 LAB
INR PPP: 5
INR PPP: 5 (ref 3–3.5)

## 2025-05-19 NOTE — PROGRESS NOTES
Pt called to report high INR of 5.0. She still reports lack of appetite due to daily stress. She will hold Warfarin today and tomorrow, recheck INR on Weds. If INR has reached a safe level by next check, we will resume her at a lower daily dosage. Opal

## 2025-05-21 ENCOUNTER — ANTICOAGULATION VISIT (OUTPATIENT)
Dept: CARDIOLOGY | Facility: CLINIC | Age: 80
End: 2025-05-21
Payer: MEDICARE

## 2025-05-21 DIAGNOSIS — Z79.01 LONG TERM (CURRENT) USE OF ANTICOAGULANTS: Primary | ICD-10-CM

## 2025-05-21 LAB — INR PPP: 3.2

## 2025-05-21 NOTE — PROGRESS NOTES
Pts INR was back down into therapeutic range at 3.2. She will resume Warfarin today with 1.25 mgs. Her new plan is 1.25 mgs on Mon and Fri with 2.5 mgs all other days. Recheck INR next week. Opal

## 2025-06-02 ENCOUNTER — ANTICOAGULATION VISIT (OUTPATIENT)
Dept: CARDIOLOGY | Facility: CLINIC | Age: 80
End: 2025-06-02
Payer: MEDICARE

## 2025-06-02 DIAGNOSIS — Z79.01 LONG TERM (CURRENT) USE OF ANTICOAGULANTS: Primary | ICD-10-CM

## 2025-06-02 LAB — INR PPP: 2.2 (ref 0.9–1.1)

## 2025-06-02 PROCEDURE — 85610 PROTHROMBIN TIME: CPT | Performed by: INTERNAL MEDICINE

## 2025-06-02 PROCEDURE — 36416 COLLJ CAPILLARY BLOOD SPEC: CPT | Performed by: INTERNAL MEDICINE

## 2025-06-02 NOTE — PROGRESS NOTES
Patient INR- 2.2, below therapeutic range. This Sn instructed patient to take 2.5 mg this evening and then continue current dosage. Recheck as contracted. bethelLPN

## 2025-06-16 ENCOUNTER — ANTICOAGULATION VISIT (OUTPATIENT)
Dept: CARDIOLOGY | Facility: CLINIC | Age: 80
End: 2025-06-16
Payer: MEDICARE

## 2025-06-16 DIAGNOSIS — Z79.01 LONG TERM (CURRENT) USE OF ANTICOAGULANTS: Primary | ICD-10-CM

## 2025-06-16 LAB — INR PPP: 4.2

## 2025-06-16 NOTE — PROGRESS NOTES
Patient's INR- 4.2, outside of therapeutic range. This Sn instructed patient to Hold her dose 6/16 & 6/17 and then resume current dosage. Recheck as contracted. ChristoN

## 2025-06-30 ENCOUNTER — ANTICOAGULATION VISIT (OUTPATIENT)
Dept: CARDIOLOGY | Facility: CLINIC | Age: 80
End: 2025-06-30
Payer: MEDICARE

## 2025-06-30 DIAGNOSIS — Z79.01 LONG TERM (CURRENT) USE OF ANTICOAGULANTS: Primary | ICD-10-CM

## 2025-06-30 LAB — INR PPP: 3.4

## 2025-07-12 DIAGNOSIS — Z79.01 LONG TERM (CURRENT) USE OF ANTICOAGULANTS: ICD-10-CM

## 2025-07-12 DIAGNOSIS — Z95.2 H/O MITRAL VALVE REPLACEMENT WITH MECHANICAL VALVE: ICD-10-CM

## 2025-07-14 ENCOUNTER — ANTICOAGULATION VISIT (OUTPATIENT)
Dept: CARDIOLOGY | Facility: CLINIC | Age: 80
End: 2025-07-14
Payer: MEDICARE

## 2025-07-14 DIAGNOSIS — Z79.01 LONG TERM (CURRENT) USE OF ANTICOAGULANTS: Primary | ICD-10-CM

## 2025-07-14 LAB — INR PPP: 1.9

## 2025-07-14 RX ORDER — WARFARIN SODIUM 2.5 MG/1
TABLET ORAL
Qty: 90 TABLET | Refills: 0 | Status: SHIPPED | OUTPATIENT
Start: 2025-07-14

## 2025-07-14 NOTE — TELEPHONE ENCOUNTER
Warfarin 2.5mg,  TAKE 1 TABLET BY MOUTH ONCE DAILY OR AS DIRECTED. Script sent to [facility] for refill. dvLPN

## 2025-07-14 NOTE — PROGRESS NOTES
Patient INR- 1.9, outside of therapeutic range. This SN instructed patient to take 2.5mg this evening and recheck as contracted. ChristoN

## 2025-07-21 ENCOUNTER — ANTICOAGULATION VISIT (OUTPATIENT)
Dept: CARDIOLOGY | Facility: CLINIC | Age: 80
End: 2025-07-21
Payer: MEDICARE

## 2025-07-21 DIAGNOSIS — Z79.01 LONG TERM (CURRENT) USE OF ANTICOAGULANTS: Primary | ICD-10-CM

## 2025-07-21 LAB — INR PPP: 3

## 2025-08-04 ENCOUNTER — ANTICOAGULATION VISIT (OUTPATIENT)
Dept: CARDIOLOGY | Facility: CLINIC | Age: 80
End: 2025-08-04
Payer: MEDICARE

## 2025-08-04 DIAGNOSIS — Z79.01 LONG TERM (CURRENT) USE OF ANTICOAGULANTS: Primary | ICD-10-CM

## 2025-08-04 LAB — INR PPP: 3.1 (ref 3–3.5)

## 2025-08-05 ENCOUNTER — OFFICE VISIT (OUTPATIENT)
Dept: CARDIOLOGY | Facility: CLINIC | Age: 80
End: 2025-08-05
Payer: MEDICARE

## 2025-08-05 VITALS
HEART RATE: 74 BPM | WEIGHT: 124 LBS | SYSTOLIC BLOOD PRESSURE: 175 MMHG | HEIGHT: 63 IN | OXYGEN SATURATION: 99 % | BODY MASS INDEX: 21.97 KG/M2 | DIASTOLIC BLOOD PRESSURE: 98 MMHG

## 2025-08-05 DIAGNOSIS — Z95.2 HISTORY OF MITRAL VALVE REPLACEMENT: ICD-10-CM

## 2025-08-05 DIAGNOSIS — I48.20 ATRIAL FIBRILLATION, CHRONIC: ICD-10-CM

## 2025-08-05 DIAGNOSIS — Z95.2 H/O MITRAL VALVE REPLACEMENT WITH MECHANICAL VALVE: ICD-10-CM

## 2025-08-05 DIAGNOSIS — Z95.2 S/P MVR (MITRAL VALVE REPLACEMENT): ICD-10-CM

## 2025-08-05 DIAGNOSIS — R47.01 MILD APHASIA: ICD-10-CM

## 2025-08-05 DIAGNOSIS — E78.5 DYSLIPIDEMIA: ICD-10-CM

## 2025-08-05 DIAGNOSIS — I10 ESSENTIAL HYPERTENSION: ICD-10-CM

## 2025-08-05 DIAGNOSIS — I63.9 CEREBROVASCULAR ACCIDENT (CVA), UNSPECIFIED MECHANISM: ICD-10-CM

## 2025-08-05 DIAGNOSIS — Z86.73 HISTORY OF CVA (CEREBROVASCULAR ACCIDENT): ICD-10-CM

## 2025-08-05 DIAGNOSIS — I63.40 CEREBROVASCULAR ACCIDENT (CVA) DUE TO EMBOLISM OF CEREBRAL ARTERY: ICD-10-CM

## 2025-08-05 DIAGNOSIS — Z79.01 LONG TERM (CURRENT) USE OF ANTICOAGULANTS: Primary | ICD-10-CM

## 2025-08-21 ENCOUNTER — ANTICOAGULATION VISIT (OUTPATIENT)
Dept: CARDIOLOGY | Facility: CLINIC | Age: 80
End: 2025-08-21
Payer: MEDICARE

## 2025-08-21 DIAGNOSIS — Z79.01 LONG TERM (CURRENT) USE OF ANTICOAGULANTS: Primary | ICD-10-CM

## 2025-08-21 LAB — INR PPP: 1.8 (ref 3–3.5)

## 2025-08-28 ENCOUNTER — ANTICOAGULATION VISIT (OUTPATIENT)
Dept: CARDIOLOGY | Facility: CLINIC | Age: 80
End: 2025-08-28
Payer: MEDICARE

## 2025-08-28 DIAGNOSIS — Z79.01 LONG TERM (CURRENT) USE OF ANTICOAGULANTS: Primary | ICD-10-CM

## 2025-08-28 LAB — INR PPP: 3.1 (ref 3–3.5)

## (undated) DEVICE — INTRO PERFORMER CHECKFLO/LG RAD/BND NO/GW 16F .038IN 30CM

## (undated) DEVICE — ST ACC MICROPUNCTURE STFF/CANN PLAT/TP 4F 21G 40CM

## (undated) DEVICE — CVR PROB ULTRASND CIVFLEX GEN/PURP TELESCP/FOLD 5.5X24IN LF

## (undated) DEVICE — PINNACLE INTRODUCER SHEATH: Brand: PINNACLE

## (undated) DEVICE — PERCLOSE™ PROSTYLE™ SUTURE-MEDIATED CLOSURE AND REPAIR SYSTEM: Brand: PERCLOSE™ PROSTYLE™

## (undated) DEVICE — 3M™ PATIENT PLATE, CORDED, SPLIT, LARGE, 40 PER CASE, 1179: Brand: 3M™

## (undated) DEVICE — ELECTRD DEFIB M/FUNC PROPADZ RADIOL 2PK

## (undated) DEVICE — 1 X VERSACROSS TRANSSEPTAL SHEATH (INCLUDING  1 X J-TIP GUIDEWIRE); 1 X VERSACROSS RF WIRE (INCLUDING 1 X CONNECTOR CABLE (SINGLE USE)); 1 X DISPERSIVE ELECTRODE: Brand: VERSACROSS ACCESS SOLUTION

## (undated) DEVICE — PK TRY HEART CATH 50

## (undated) DEVICE — TBG NAMIC PRESS MONTR A/ F/M 12IN

## (undated) DEVICE — CATH DIAG IMPULSE PIG 5F 100CM

## (undated) DEVICE — ST ACC VASC MICROPUNCTURE STFF SS/GW/.018 SS/TP 4F 21G

## (undated) DEVICE — Device